# Patient Record
Sex: MALE | Race: BLACK OR AFRICAN AMERICAN | NOT HISPANIC OR LATINO | Employment: UNEMPLOYED | URBAN - METROPOLITAN AREA
[De-identification: names, ages, dates, MRNs, and addresses within clinical notes are randomized per-mention and may not be internally consistent; named-entity substitution may affect disease eponyms.]

---

## 2021-02-22 ENCOUNTER — LAB (OUTPATIENT)
Dept: LAB | Facility: CLINIC | Age: 46
End: 2021-02-22
Payer: COMMERCIAL

## 2021-02-22 ENCOUNTER — TRANSCRIBE ORDERS (OUTPATIENT)
Dept: LAB | Facility: CLINIC | Age: 46
End: 2021-02-22

## 2021-02-22 DIAGNOSIS — F41.1 GENERALIZED ANXIETY DISORDER: ICD-10-CM

## 2021-02-22 DIAGNOSIS — Z11.4 SCREENING FOR HUMAN IMMUNODEFICIENCY VIRUS: ICD-10-CM

## 2021-02-22 DIAGNOSIS — Z00.00 ROUTINE GENERAL MEDICAL EXAMINATION AT A HEALTH CARE FACILITY: ICD-10-CM

## 2021-02-22 DIAGNOSIS — Z00.00 ROUTINE GENERAL MEDICAL EXAMINATION AT A HEALTH CARE FACILITY: Primary | ICD-10-CM

## 2021-02-22 DIAGNOSIS — E55.9 VITAMIN D DEFICIENCY: ICD-10-CM

## 2021-02-22 DIAGNOSIS — R73.9 BLOOD GLUCOSE ELEVATED: ICD-10-CM

## 2021-02-22 LAB
25(OH)D3 SERPL-MCNC: 17.1 NG/ML (ref 30–100)
ALBUMIN SERPL BCP-MCNC: 4.1 G/DL (ref 3.5–5)
ALP SERPL-CCNC: 73 U/L (ref 46–116)
ALT SERPL W P-5'-P-CCNC: 20 U/L (ref 12–78)
ANION GAP SERPL CALCULATED.3IONS-SCNC: 5 MMOL/L (ref 4–13)
AST SERPL W P-5'-P-CCNC: 20 U/L (ref 5–45)
BASOPHILS # BLD AUTO: 0.03 THOUSANDS/ΜL (ref 0–0.1)
BASOPHILS NFR BLD AUTO: 1 % (ref 0–1)
BILIRUB SERPL-MCNC: 0.43 MG/DL (ref 0.2–1)
BUN SERPL-MCNC: 18 MG/DL (ref 5–25)
CALCIUM SERPL-MCNC: 9.6 MG/DL (ref 8.3–10.1)
CHLORIDE SERPL-SCNC: 111 MMOL/L (ref 100–108)
CHOLEST SERPL-MCNC: 169 MG/DL (ref 50–200)
CO2 SERPL-SCNC: 27 MMOL/L (ref 21–32)
CREAT SERPL-MCNC: 1.09 MG/DL (ref 0.6–1.3)
EOSINOPHIL # BLD AUTO: 0.08 THOUSAND/ΜL (ref 0–0.61)
EOSINOPHIL NFR BLD AUTO: 2 % (ref 0–6)
ERYTHROCYTE [DISTWIDTH] IN BLOOD BY AUTOMATED COUNT: 12.5 % (ref 11.6–15.1)
EST. AVERAGE GLUCOSE BLD GHB EST-MCNC: 91 MG/DL
GFR SERPL CREATININE-BSD FRML MDRD: 96 ML/MIN/1.73SQ M
GLUCOSE P FAST SERPL-MCNC: 96 MG/DL (ref 65–99)
HBA1C MFR BLD: 4.8 %
HCT VFR BLD AUTO: 43.7 % (ref 36.5–49.3)
HDLC SERPL-MCNC: 39 MG/DL
HGB BLD-MCNC: 13.8 G/DL (ref 12–17)
IMM GRANULOCYTES # BLD AUTO: 0.01 THOUSAND/UL (ref 0–0.2)
IMM GRANULOCYTES NFR BLD AUTO: 0 % (ref 0–2)
LDLC SERPL CALC-MCNC: 112 MG/DL (ref 0–100)
LYMPHOCYTES # BLD AUTO: 1.72 THOUSANDS/ΜL (ref 0.6–4.47)
LYMPHOCYTES NFR BLD AUTO: 35 % (ref 14–44)
MCH RBC QN AUTO: 30.3 PG (ref 26.8–34.3)
MCHC RBC AUTO-ENTMCNC: 31.6 G/DL (ref 31.4–37.4)
MCV RBC AUTO: 96 FL (ref 82–98)
MONOCYTES # BLD AUTO: 0.52 THOUSAND/ΜL (ref 0.17–1.22)
MONOCYTES NFR BLD AUTO: 11 % (ref 4–12)
NEUTROPHILS # BLD AUTO: 2.57 THOUSANDS/ΜL (ref 1.85–7.62)
NEUTS SEG NFR BLD AUTO: 51 % (ref 43–75)
NONHDLC SERPL-MCNC: 130 MG/DL
NRBC BLD AUTO-RTO: 0 /100 WBCS
PLATELET # BLD AUTO: 154 THOUSANDS/UL (ref 149–390)
PMV BLD AUTO: 12.3 FL (ref 8.9–12.7)
POTASSIUM SERPL-SCNC: 4.7 MMOL/L (ref 3.5–5.3)
PROT SERPL-MCNC: 8 G/DL (ref 6.4–8.2)
RBC # BLD AUTO: 4.55 MILLION/UL (ref 3.88–5.62)
SODIUM SERPL-SCNC: 143 MMOL/L (ref 136–145)
TRIGL SERPL-MCNC: 90 MG/DL
TSH SERPL DL<=0.05 MIU/L-ACNC: 1.13 UIU/ML (ref 0.36–3.74)
WBC # BLD AUTO: 4.93 THOUSAND/UL (ref 4.31–10.16)

## 2021-02-22 PROCEDURE — 87389 HIV-1 AG W/HIV-1&-2 AB AG IA: CPT

## 2021-02-22 PROCEDURE — 80061 LIPID PANEL: CPT

## 2021-02-22 PROCEDURE — 80053 COMPREHEN METABOLIC PANEL: CPT

## 2021-02-22 PROCEDURE — 82306 VITAMIN D 25 HYDROXY: CPT

## 2021-02-22 PROCEDURE — 84443 ASSAY THYROID STIM HORMONE: CPT

## 2021-02-22 PROCEDURE — 85025 COMPLETE CBC W/AUTO DIFF WBC: CPT

## 2021-02-22 PROCEDURE — 83036 HEMOGLOBIN GLYCOSYLATED A1C: CPT

## 2021-02-22 PROCEDURE — 36415 COLL VENOUS BLD VENIPUNCTURE: CPT

## 2021-02-23 LAB — HIV 1+2 AB+HIV1 P24 AG SERPL QL IA: NORMAL

## 2024-02-21 ENCOUNTER — OFFICE VISIT (OUTPATIENT)
Age: 49
End: 2024-02-21
Payer: COMMERCIAL

## 2024-02-21 VITALS
TEMPERATURE: 97.5 F | BODY MASS INDEX: 18.27 KG/M2 | DIASTOLIC BLOOD PRESSURE: 60 MMHG | RESPIRATION RATE: 18 BRPM | SYSTOLIC BLOOD PRESSURE: 90 MMHG | OXYGEN SATURATION: 98 % | WEIGHT: 116.4 LBS | HEART RATE: 82 BPM | HEIGHT: 67 IN

## 2024-02-21 DIAGNOSIS — Z00.00 PREVENTATIVE HEALTH CARE: ICD-10-CM

## 2024-02-21 DIAGNOSIS — M79.605 LEFT LEG PAIN: Primary | ICD-10-CM

## 2024-02-21 PROCEDURE — 99396 PREV VISIT EST AGE 40-64: CPT

## 2024-02-21 PROCEDURE — 99213 OFFICE O/P EST LOW 20 MIN: CPT

## 2024-02-21 NOTE — PROGRESS NOTES
ADULT ANNUAL PHYSICAL  [facility] PRIMARY CARE [place]    NAME: Erik Cordon  AGE: 48 y.o. SEX: male  : 1975     DATE: 2024     Assessment and Plan:     Problem List Items Addressed This Visit     Left leg pain - Primary     Patient has been experiencing some left leg/calf pain for the past 2 to 3 months after an injury to his left leg from the weight of a wheelchair.  Pain worse with walking and exercise.  I recommended the patient try physical therapy and take ibuprofen and/or Tylenol as needed for the pain.  Recommended the patient stay well-hydrated by drinking 6 to 8 glasses of water per day to avoid muscle cramps.  Instructed the patient to follow-up with us if his symptoms do not improve.         Relevant Orders    Ambulatory Referral to Physical Therapy    Preventative health care     Patient is here to establish care.  Routine lab work was placed.  Patient is due for colonoscopy, routine dental care, and routine ophthalmology evaluation.  Appropriate referrals placed.  Highly recommended that the patient follow-up with the specialists for preventative care.  Recommended the patient brushes teeth 2 times daily with toothpaste and schedule appointment with a dentist given history of some abnormal dentition and dental pain.  He denies any current dental pain.  He denies any current fever or chills.  Patient instructed to follow-up as needed with any questions or concerns.         Relevant Orders    Ambulatory Referral to Gastroenterology    PSA, Total Screen    CBC and differential    Comprehensive metabolic panel    TSH, 3rd generation with Free T4 reflex    Lipid panel    Ambulatory Referral to Dentistry    Ambulatory Referral to Ophthalmology       Immunizations and preventive care screenings were discussed with patient today. Appropriate education was printed on patient's after visit summary.    Discussed risks and benefits of prostate cancer  screening. We discussed the controversial history of PSA screening for prostate cancer in the [place] as well as the risk of over detection and over treatment of prostate cancer by way of PSA screening.  The patient understands that PSA blood testing is an imperfect way to screen for prostate cancer and that elevated PSA levels in the blood may also be caused by infection, inflammation, prostatic trauma or manipulation, urological procedures, or by benign prostatic enlargement.      Counseling:  Dental Health: discussed importance of regular tooth brushing, flossing, and dental visits.  Exercise: the importance of regular exercise/physical activity was discussed. Recommend exercise 3-5 times per week for at least 30 minutes.          No follow-ups on file.     Chief Complaint:     Chief Complaint   Patient presents with   • Establish Care     Mid section feels painful as well and says he wants testing for prostate cancer    • Leg Pain     Left leg pain    • Back Pain     Especially when it gets cold   • Fatigue   • Ingrown Toenail   • hand sweating    • Physical Exam   • GI Problem     Too much gas      History of Present Illness:     Adult Annual Physical   Patient here for a comprehensive physical exam. The patient reports problems - left leg pain .  Left leg pain for past 2-3 months. He was trying to help a man in a wheelchair down the stairs approximately 5 months ago when the wheelchair slid backward and hit his left leg.  Since he has had some left leg pain but hurts worst with walking, exercising.  He has tried stretching and gentle massage.  He primarily gets the pain in the back of his left calf.  He states that it feels like a charley horse at times.  The pain goes away with rest.  He takes Tylenol and/or ibuprofen as needed for the pain.     Diet and Physical Activity  Diet/Nutrition: well balanced diet.   Exercise: walking.      Depression Screening  PHQ-2/9 Depression Screening    Little interest  or pleasure in doing things: 1 - several days  Feeling down, depressed, or hopeless: 1 - several days  PHQ-2 Score: 2  PHQ-2 Interpretation: Negative depression screen       General Health  Sleep: sleeps well.   Hearing: normal - bilateral.  Vision: vision problems: states his vision is not great. He has not seen an eye doctor recently .   Dental: does not brush teeth regularly.   Says he will brush his teeth with water and not use toothpaste.      Health  Symptoms include: none    Advanced Care Planning  Do you have an advanced directive? unknown  Do you have a durable medical power of ?   ACP document given to patient? no     Review of Systems:     Review of Systems   Constitutional:  Negative for chills and fever.   HENT:  Negative for congestion and sore throat.    Eyes:  Negative for pain and visual disturbance.   Respiratory:  Negative for cough and shortness of breath.    Cardiovascular:  Negative for chest pain and palpitations.   Gastrointestinal:  Negative for abdominal pain, constipation and diarrhea.   Genitourinary:  Negative for dysuria and hematuria.   Musculoskeletal:  Positive for myalgias (Left leg pain). Negative for arthralgias.   Skin:  Negative for color change and rash.   Neurological:  Negative for dizziness and headaches.      Past Medical History:     History reviewed. No pertinent past medical history.   Past Surgical History:     History reviewed. No pertinent surgical history.   Family History:     History reviewed. No pertinent family history.   Social History:     Social History     Socioeconomic History   • Marital status: Registered Domestic Partner     Spouse name: None   • Number of children: None   • Years of education: None   • Highest education level: None   Occupational History   • None   Tobacco Use   • Smoking status: Every Day     Current packs/day: 0.50     Average packs/day: 0.5 packs/day for 27.1 years (13.6 ttl pk-yrs)     Types: Cigarettes     Start date: 1997  "  • Smokeless tobacco: Never   Vaping Use   • Vaping status: Never Used   Substance and Sexual Activity   • Alcohol use: Yes     Comment: holidays   • Drug use: Yes     Frequency: 7.0 times per week     Types: Marijuana     Comment: 4 times a day   • Sexual activity: None   Other Topics Concern   • None   Social History Narrative   • None     Social Determinants of Health     Financial Resource Strain: Not on file   Food Insecurity: Not on file   Transportation Needs: Not on file   Physical Activity: Not on file   Stress: Not on file   Social Connections: Not on file   Intimate Partner Violence: Not on file   Housing Stability: Not on file      Current Medications:     No current outpatient medications on file.     No current facility-administered medications for this visit.      Allergies:     Allergies   Allergen Reactions   • Pollen Extract Other (See Comments)     Watery eyes      Physical Exam:     BP 90/60 (BP Location: Right arm, Patient Position: Sitting, Cuff Size: Standard)   Pulse 82   Temp 97.5 °F (36.4 °C) (Tympanic)   Resp 18   Ht 5' 6.5\" (1.689 m)   Wt 52.8 kg (116 lb 6.4 oz)   SpO2 98%   BMI 18.51 kg/m²     Physical Exam  Vitals and nursing note reviewed.   Constitutional:       General: He is not in acute distress.     Appearance: He is well-developed.   HENT:      Head: Normocephalic and atraumatic.      Right Ear: Tympanic membrane normal.      Left Ear: Tympanic membrane normal.      Nose: Nose normal.      Mouth/Throat:      Mouth: Mucous membranes are moist.      Dentition: Abnormal dentition. Dental caries present. No dental abscesses or gum lesions.      Pharynx: Oropharynx is clear. No oropharyngeal exudate.      Comments: Evidence of some missing dentition and dental caries.  Eyes:      Extraocular Movements: Extraocular movements intact.      Conjunctiva/sclera: Conjunctivae normal.   Cardiovascular:      Rate and Rhythm: Normal rate and regular rhythm.      Heart sounds: No murmur " heard.  Pulmonary:      Effort: Pulmonary effort is normal. No respiratory distress.      Breath sounds: Normal breath sounds. No wheezing, rhonchi or rales.   Abdominal:      Palpations: Abdomen is soft.      Tenderness: There is no abdominal tenderness.   Musculoskeletal:         General: No swelling, tenderness or deformity. Normal range of motion.      Cervical back: Neck supple.      Right lower leg: No edema.      Left lower leg: No edema.      Comments: No tenderness to palpation of the calf muscles.  No bruises or lesions of the lower extremity noted.  Strength and sensation intact bilaterally 5/5.    Skin:     General: Skin is warm and dry.      Capillary Refill: Capillary refill takes less than 2 seconds.   Neurological:      General: No focal deficit present.      Mental Status: He is alert.      Motor: No weakness.      Gait: Gait normal.   Psychiatric:         Mood and Affect: Mood is anxious.         Behavior: Behavior normal.          Megan Michaels PA-C  [facility] PRIMARY CARE [place]

## 2024-02-21 NOTE — ASSESSMENT & PLAN NOTE
Patient is here to establish care.  Routine lab work was placed.  Patient is due for colonoscopy, routine dental care, and routine ophthalmology evaluation.  Appropriate referrals placed.  Highly recommended that the patient follow-up with the specialists for preventative care.  Recommended the patient brushes teeth 2 times daily with toothpaste and schedule appointment with a dentist given history of some abnormal dentition and dental pain.  He denies any current dental pain.  He denies any current fever or chills.  Patient instructed to follow-up as needed with any questions or concerns.

## 2024-02-21 NOTE — ASSESSMENT & PLAN NOTE
Patient has been experiencing some left leg/calf pain for the past 2 to 3 months after an injury to his left leg from the weight of a wheelchair.  Pain worse with walking and exercise.  I recommended the patient try physical therapy and take ibuprofen and/or Tylenol as needed for the pain.  Recommended the patient stay well-hydrated by drinking 6 to 8 glasses of water per day to avoid muscle cramps.  Instructed the patient to follow-up with us if his symptoms do not improve.

## 2024-02-22 ENCOUNTER — TELEPHONE (OUTPATIENT)
Age: 49
End: 2024-02-22

## 2024-02-22 ENCOUNTER — APPOINTMENT (OUTPATIENT)
Age: 49
End: 2024-02-22
Payer: COMMERCIAL

## 2024-02-22 DIAGNOSIS — Z13.6 SCREENING FOR CARDIOVASCULAR, RESPIRATORY, AND GENITOURINARY DISEASES: Primary | ICD-10-CM

## 2024-02-22 DIAGNOSIS — Z13.89 SCREENING FOR CARDIOVASCULAR, RESPIRATORY, AND GENITOURINARY DISEASES: Primary | ICD-10-CM

## 2024-02-22 DIAGNOSIS — Z13.83 SCREENING FOR CARDIOVASCULAR, RESPIRATORY, AND GENITOURINARY DISEASES: Primary | ICD-10-CM

## 2024-02-22 DIAGNOSIS — Z00.00 PREVENTATIVE HEALTH CARE: ICD-10-CM

## 2024-02-22 DIAGNOSIS — Z12.5 SCREENING FOR MALIGNANT NEOPLASM OF PROSTATE: ICD-10-CM

## 2024-02-22 LAB
ALBUMIN SERPL BCP-MCNC: 3.9 G/DL (ref 3.5–5)
ALP SERPL-CCNC: 74 U/L (ref 34–104)
ALT SERPL W P-5'-P-CCNC: 13 U/L (ref 7–52)
ANION GAP SERPL CALCULATED.3IONS-SCNC: 5 MMOL/L
AST SERPL W P-5'-P-CCNC: 16 U/L (ref 13–39)
BASOPHILS # BLD AUTO: 0.04 THOUSANDS/ÂΜL (ref 0–0.1)
BASOPHILS NFR BLD AUTO: 1 % (ref 0–1)
BILIRUB SERPL-MCNC: 0.41 MG/DL (ref 0.2–1)
BUN SERPL-MCNC: 20 MG/DL (ref 5–25)
CALCIUM SERPL-MCNC: 8.7 MG/DL (ref 8.4–10.2)
CHLORIDE SERPL-SCNC: 107 MMOL/L (ref 96–108)
CHOLEST SERPL-MCNC: 173 MG/DL
CO2 SERPL-SCNC: 26 MMOL/L (ref 21–32)
CREAT SERPL-MCNC: 1.01 MG/DL (ref 0.6–1.3)
EOSINOPHIL # BLD AUTO: 0.14 THOUSAND/ÂΜL (ref 0–0.61)
EOSINOPHIL NFR BLD AUTO: 2 % (ref 0–6)
ERYTHROCYTE [DISTWIDTH] IN BLOOD BY AUTOMATED COUNT: 12.7 % (ref 11.6–15.1)
GFR SERPL CREATININE-BSD FRML MDRD: 88 ML/MIN/1.73SQ M
GLUCOSE P FAST SERPL-MCNC: 89 MG/DL (ref 65–99)
HCT VFR BLD AUTO: 42.2 % (ref 36.5–49.3)
HDLC SERPL-MCNC: 41 MG/DL
HGB BLD-MCNC: 13.5 G/DL (ref 12–17)
IMM GRANULOCYTES # BLD AUTO: 0.01 THOUSAND/UL (ref 0–0.2)
IMM GRANULOCYTES NFR BLD AUTO: 0 % (ref 0–2)
LDLC SERPL CALC-MCNC: 117 MG/DL (ref 0–100)
LYMPHOCYTES # BLD AUTO: 1.88 THOUSANDS/ÂΜL (ref 0.6–4.47)
LYMPHOCYTES NFR BLD AUTO: 25 % (ref 14–44)
MCH RBC QN AUTO: 30.8 PG (ref 26.8–34.3)
MCHC RBC AUTO-ENTMCNC: 32 G/DL (ref 31.4–37.4)
MCV RBC AUTO: 96 FL (ref 82–98)
MONOCYTES # BLD AUTO: 0.7 THOUSAND/ÂΜL (ref 0.17–1.22)
MONOCYTES NFR BLD AUTO: 9 % (ref 4–12)
NEUTROPHILS # BLD AUTO: 4.76 THOUSANDS/ÂΜL (ref 1.85–7.62)
NEUTS SEG NFR BLD AUTO: 63 % (ref 43–75)
NONHDLC SERPL-MCNC: 132 MG/DL
NRBC BLD AUTO-RTO: 0 /100 WBCS
PLATELET # BLD AUTO: 181 THOUSANDS/UL (ref 149–390)
PMV BLD AUTO: 11.3 FL (ref 8.9–12.7)
POTASSIUM SERPL-SCNC: 4.4 MMOL/L (ref 3.5–5.3)
PROT SERPL-MCNC: 7.3 G/DL (ref 6.4–8.4)
PSA SERPL-MCNC: 0.4 NG/ML (ref 0–4)
RBC # BLD AUTO: 4.38 MILLION/UL (ref 3.88–5.62)
SODIUM SERPL-SCNC: 138 MMOL/L (ref 135–147)
TRIGL SERPL-MCNC: 74 MG/DL
TSH SERPL DL<=0.05 MIU/L-ACNC: 0.97 UIU/ML (ref 0.45–4.5)
WBC # BLD AUTO: 7.53 THOUSAND/UL (ref 4.31–10.16)

## 2024-02-22 PROCEDURE — 85025 COMPLETE CBC W/AUTO DIFF WBC: CPT

## 2024-02-22 PROCEDURE — 84443 ASSAY THYROID STIM HORMONE: CPT

## 2024-02-22 PROCEDURE — 80061 LIPID PANEL: CPT

## 2024-02-22 PROCEDURE — G0103 PSA SCREENING: HCPCS

## 2024-02-22 PROCEDURE — 36415 COLL VENOUS BLD VENIPUNCTURE: CPT

## 2024-02-22 PROCEDURE — 80053 COMPREHEN METABOLIC PANEL: CPT

## 2024-02-22 NOTE — TELEPHONE ENCOUNTER
Called the patient to discuss lab results. His wife, Leila Foy, sent the [employer] message and requested that I discuss her 's test results with her. I confirmed her identity and reviewed the medical communication consent form on file to ensure that she has consent to receive information pertaining to [facility]. Once consent was confirmed, the results of the lab tests were discussed with the patient's wife in detail. I instructed Leila to follow up with us with any questions or concerns.

## 2024-02-28 DIAGNOSIS — M79.605 LEFT LEG PAIN: Primary | ICD-10-CM

## 2024-02-28 RX ORDER — IBUPROFEN 200 MG
400 TABLET ORAL EVERY 6 HOURS PRN
Qty: 120 TABLET | Refills: 1 | Status: SHIPPED | OUTPATIENT
Start: 2024-02-28

## 2024-02-29 ENCOUNTER — APPOINTMENT (OUTPATIENT)
Dept: RADIOLOGY | Facility: CLINIC | Age: 49
End: 2024-02-29
Payer: COMMERCIAL

## 2024-02-29 ENCOUNTER — OFFICE VISIT (OUTPATIENT)
Dept: OBGYN CLINIC | Facility: CLINIC | Age: 49
End: 2024-02-29
Payer: COMMERCIAL

## 2024-02-29 VITALS
DIASTOLIC BLOOD PRESSURE: 71 MMHG | SYSTOLIC BLOOD PRESSURE: 112 MMHG | BODY MASS INDEX: 18.05 KG/M2 | HEART RATE: 63 BPM | HEIGHT: 67 IN | WEIGHT: 115 LBS

## 2024-02-29 DIAGNOSIS — M43.06 PARS DEFECT OF LUMBAR SPINE: ICD-10-CM

## 2024-02-29 DIAGNOSIS — M79.662 PAIN IN LEFT LOWER LEG: ICD-10-CM

## 2024-02-29 DIAGNOSIS — M54.16 RADICULOPATHY, LUMBAR REGION: ICD-10-CM

## 2024-02-29 DIAGNOSIS — S80.12XA CONTUSION OF LEFT LOWER LEG, INITIAL ENCOUNTER: ICD-10-CM

## 2024-02-29 DIAGNOSIS — S39.012A LUMBAR STRAIN, INITIAL ENCOUNTER: ICD-10-CM

## 2024-02-29 DIAGNOSIS — M62.830 LUMBAR PARASPINAL MUSCLE SPASM: ICD-10-CM

## 2024-02-29 DIAGNOSIS — M54.16 RADICULOPATHY, LUMBAR REGION: Primary | ICD-10-CM

## 2024-02-29 DIAGNOSIS — R29.898 WEAKNESS OF BOTH HIPS: ICD-10-CM

## 2024-02-29 PROCEDURE — 72110 X-RAY EXAM L-2 SPINE 4/>VWS: CPT

## 2024-02-29 PROCEDURE — 73590 X-RAY EXAM OF LOWER LEG: CPT

## 2024-02-29 PROCEDURE — 99204 OFFICE O/P NEW MOD 45 MIN: CPT | Performed by: FAMILY MEDICINE

## 2024-02-29 RX ORDER — PREDNISONE 20 MG/1
20 TABLET ORAL 2 TIMES DAILY WITH MEALS
Qty: 10 TABLET | Refills: 0 | Status: SHIPPED | OUTPATIENT
Start: 2024-02-29 | End: 2024-03-05

## 2024-02-29 RX ORDER — MELOXICAM 15 MG/1
15 TABLET ORAL DAILY
Qty: 30 TABLET | Refills: 1 | Status: SHIPPED | OUTPATIENT
Start: 2024-02-29

## 2024-02-29 NOTE — PATIENT INSTRUCTIONS
Rx: Prednisone 20 mg  - Twice daily  - Eat first  - 5 days  - Makes you energetic  - No ibuprofen  - No Aleve  - Tylenol is okay    After completing prednisone start meloxicam    Rx: Meloxicam 15 mg  - once daily  - eat first  - everyday  - 30 days  - no ibuprofen  - no aleve  - tylenol is ok    Physical therapy and home exercises    Over-the-counter vitamins:    - Turmeric vitamin at least 1000 mg daily    - Tat cherry vitamin at least 1000 mg daily    - Glucosamine-chondrointin 2 -3 times daily    Over-the-counter topical diclofenac  - 3 times daily as needed
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
## 2024-03-27 ENCOUNTER — EVALUATION (OUTPATIENT)
Dept: PHYSICAL THERAPY | Facility: CLINIC | Age: 49
End: 2024-03-27
Payer: COMMERCIAL

## 2024-03-27 DIAGNOSIS — M62.830 LUMBAR PARASPINAL MUSCLE SPASM: ICD-10-CM

## 2024-03-27 DIAGNOSIS — M54.16 RADICULOPATHY, LUMBAR REGION: ICD-10-CM

## 2024-03-27 DIAGNOSIS — S39.012A LUMBAR STRAIN, INITIAL ENCOUNTER: ICD-10-CM

## 2024-03-27 DIAGNOSIS — M43.06 PARS DEFECT OF LUMBAR SPINE: ICD-10-CM

## 2024-03-27 DIAGNOSIS — S80.12XA CONTUSION OF LEFT LOWER LEG, INITIAL ENCOUNTER: ICD-10-CM

## 2024-03-27 DIAGNOSIS — R29.898 WEAKNESS OF BOTH HIPS: ICD-10-CM

## 2024-03-27 PROCEDURE — 97161 PT EVAL LOW COMPLEX 20 MIN: CPT

## 2024-03-27 PROCEDURE — 97110 THERAPEUTIC EXERCISES: CPT
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
## 2024-04-02 ENCOUNTER — OFFICE VISIT (OUTPATIENT)
Dept: PHYSICAL THERAPY | Facility: CLINIC | Age: 49
End: 2024-04-02
Payer: COMMERCIAL

## 2024-04-02 DIAGNOSIS — R29.898 WEAKNESS OF BOTH HIPS: ICD-10-CM

## 2024-04-02 DIAGNOSIS — S39.012A LUMBAR STRAIN, INITIAL ENCOUNTER: ICD-10-CM

## 2024-04-02 DIAGNOSIS — M43.06 PARS DEFECT OF LUMBAR SPINE: ICD-10-CM

## 2024-04-02 DIAGNOSIS — M62.830 LUMBAR PARASPINAL MUSCLE SPASM: ICD-10-CM

## 2024-04-02 DIAGNOSIS — M54.16 RADICULOPATHY, LUMBAR REGION: Primary | ICD-10-CM

## 2024-04-02 DIAGNOSIS — S80.12XA CONTUSION OF LEFT LOWER LEG, INITIAL ENCOUNTER: ICD-10-CM

## 2024-04-02 PROCEDURE — 97110 THERAPEUTIC EXERCISES: CPT

## 2024-04-02 PROCEDURE — 97112 NEUROMUSCULAR REEDUCATION: CPT

## 2024-04-02 NOTE — PROGRESS NOTES
"Daily Note     Today's date: 2024  Patient name: Erik Cordon  : 1975  MRN: [id number]  Referring provider: Ronny Farrell*  Dx:   Encounter Diagnosis     ICD-10-CM    1. Radiculopathy, lumbar region  M54.16       2. Pars defect of lumbar spine  M43.06       3. Lumbar paraspinal muscle spasm  M62.830       4. Weakness of both hips  R29.898       5. Lumbar strain, initial encounter  S39.012A       6. Contusion of left lower leg, initial encounter  S80.12XA           Start Time: 1015  Stop Time: 1054  Total time in clinic (min): 39 minutes    Subjective: Pt reports that he hasn't been doing his HEP. He states that he is feeling very sore from working on his car yesterday. He states that he gets cold after he exercises.      Objective: See treatment diary below      Assessment: Pt has limited tolerance to today's session due to pain and fatigue. Pt is challenged with muscular endurance. PT advised pt to follow up with PCP in regards to post exercise cold feeling. Pt will benefit from skilled physical therapy in order to improve TA activation and endurance, improve lumbar mobility, and reduce pain with function.       Plan: Continue per plan of care.  Progress treatment as tolerated.       Precautions: None  Access Code: [email or web address]    POC expires Unit limit Auth Expiration date PT/OT/ST + Visit Limit?   5/10/24 N/A 24 BOMN                           Visit/Unit Tracking  AUTH Status:  Date 3/27 4/2             Approved 18 visits Used 1 2              Remaining  17 16                   Date 3/27 4/2           Re-Eval             FOTO             Manuals                                                                 Neuro Re-Ed             TA activation 5\"x20 HL 5\"x20 HL           Paloff press             Bridge + TA  x10                                                               Ther Ex             Bike  6'           PPU 3x10 2x10           LEIS 3x10            LTR   5\"x10             "                                      Ther Activity                                       Gait Training                                       Modalities
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
## 2024-04-05 ENCOUNTER — CONSULT (OUTPATIENT)
Dept: PAIN MEDICINE | Facility: CLINIC | Age: 49
End: 2024-04-05
Payer: COMMERCIAL

## 2024-04-05 VITALS
DIASTOLIC BLOOD PRESSURE: 78 MMHG | SYSTOLIC BLOOD PRESSURE: 122 MMHG | HEART RATE: 73 BPM | WEIGHT: 119.2 LBS | HEIGHT: 67 IN | BODY MASS INDEX: 18.71 KG/M2

## 2024-04-05 DIAGNOSIS — M54.16 LUMBAR RADICULOPATHY: ICD-10-CM

## 2024-04-05 DIAGNOSIS — M54.42 CHRONIC BILATERAL LOW BACK PAIN WITH LEFT-SIDED SCIATICA: Primary | ICD-10-CM

## 2024-04-05 DIAGNOSIS — G89.29 CHRONIC BILATERAL LOW BACK PAIN WITH LEFT-SIDED SCIATICA: Primary | ICD-10-CM

## 2024-04-05 PROCEDURE — 99204 OFFICE O/P NEW MOD 45 MIN: CPT | Performed by: STUDENT IN AN ORGANIZED HEALTH CARE EDUCATION/TRAINING PROGRAM

## 2024-04-05 NOTE — PATIENT INSTRUCTIONS
Magnetic Resonance Imaging   WHAT YOU NEED TO KNOW:   Magnetic resonance imaging (MRI) is a test that uses magnetic fields and radio waves to take pictures inside your body. An MRI is used to see blood vessels, tissue, muscles, and bones. It can also show organs, such as your heart, lungs, or liver. An MRI can help your healthcare provider diagnose or treat a medical condition. It does not use radiation.  DISCHARGE INSTRUCTIONS:   Call your local emergency number (911 in the ) if:   You have signs of an allergic reaction to the contrast liquid, such as trouble breathing, swelling of your mouth or face, or fainting.      Return to the emergency department if:   You are dizzy or feel faint.    You have a rash, itching, or swollen skin.    You have nausea or are vomiting.    You are suddenly urinating less than usual.    Call your doctor if:   You have questions or concerns about your condition or care.      Drink liquids as directed:  Liquids will help flush the contrast liquid out of your body. Ask how much liquid to drink after your MRI, and which liquids to drink.  Follow up with your doctor as directed:  Write down your questions so you remember to ask them during your visits.  © Copyright [employer] 2023 Information is for End User's use only and may not be sold, redistributed or otherwise used for commercial purposes.  The above information is an  only. It is not intended as medical advice for individual conditions or treatments. Talk to your doctor, nurse or pharmacist before following any medical regimen to see if it is safe and effective for you.

## 2024-04-12 ENCOUNTER — OFFICE VISIT (OUTPATIENT)
Dept: PHYSICAL THERAPY | Facility: CLINIC | Age: 49
End: 2024-04-12
Payer: COMMERCIAL

## 2024-04-12 DIAGNOSIS — S39.012A LUMBAR STRAIN, INITIAL ENCOUNTER: ICD-10-CM

## 2024-04-12 DIAGNOSIS — S80.12XA CONTUSION OF LEFT LOWER LEG, INITIAL ENCOUNTER: ICD-10-CM

## 2024-04-12 DIAGNOSIS — M43.06 PARS DEFECT OF LUMBAR SPINE: ICD-10-CM

## 2024-04-12 DIAGNOSIS — R29.898 WEAKNESS OF BOTH HIPS: ICD-10-CM

## 2024-04-12 DIAGNOSIS — M54.16 RADICULOPATHY, LUMBAR REGION: Primary | ICD-10-CM

## 2024-04-12 DIAGNOSIS — M62.830 LUMBAR PARASPINAL MUSCLE SPASM: ICD-10-CM

## 2024-04-12 PROCEDURE — 97110 THERAPEUTIC EXERCISES: CPT

## 2024-04-12 PROCEDURE — 97112 NEUROMUSCULAR REEDUCATION: CPT

## 2024-04-12 NOTE — PROGRESS NOTES
Daily Note     Today's date: 2024  Patient name: Erik Cordon  : 1975  MRN: [id number]  Referring provider: Ronny Farrell*  Dx:   Encounter Diagnosis     ICD-10-CM    1. Radiculopathy, lumbar region  M54.16       2. Pars defect of lumbar spine  M43.06       3. Lumbar paraspinal muscle spasm  M62.830       4. Weakness of both hips  R29.898       5. Lumbar strain, initial encounter  S39.012A       6. Contusion of left lower leg, initial encounter  S80.12XA                      Subjective: Pt states he was sore following last session.  He states he feels he is getting worse due to increased LBP.  Following bike for cardiovascular endurance, patient reports lightheadedness.  He reports feeling cold after exercise.  Pt also notes he has been feeling depressed lately secondary to his inability to be active.  Denies thoughts of harming himself.      Regarding low back and LE pain, pt reports he has difficulty walking and standing.        Objective: See treatment diary below  BP seated in RUE with manual cuff is 118/80 (taken after biking)      Assessment: Pt was provided contact information for behavioral health due to recent depression.  Patient also advised to follow up with PCP regarding recent lightheadedness and dizziness.  He demonstrates verbal understanding.  No change in LE sx with prone progression and reports exacerbation of sx.  No progressions in treatment today secondary to high sx irritability.        Plan: Continue per plan of care.      Precautions: None  Access Code: [email or web address]    POC expires Unit limit Auth Expiration date PT/OT/ST + Visit Limit?   5/10/24 N/A 24 BOMN                           Visit/Unit Tracking  AUTH Status:  Date 3/27 4/2 4/12            Approved 18 visits Used 1 2 3             Remaining  17 16 15                  Date 3/27 4/2 4/12          Re-Eval             FOTO             Manuals                                                                "  Neuro Re-Ed             Pt education   Pain science          TA activation 5\"x20 HL 5\"x20 HL           Paloff press             Bridge + TA  x10 x10                                                              Ther Ex             Pt education   Tx, PT POC          Bike  6' 5' no resist          PPU 3x10 2x10 2x10          LEIS 3x10            LTR   5\"x10           Prone lying   5'          TAJ   5' c breaths                       Ther Activity                                       Gait Training                                       Modalities                                              " I have reviewed and confirmed nurses' notes...

## 2024-04-19 ENCOUNTER — APPOINTMENT (OUTPATIENT)
Dept: PHYSICAL THERAPY | Facility: CLINIC | Age: 49
End: 2024-04-19
Payer: COMMERCIAL

## 2024-04-22 ENCOUNTER — HOSPITAL ENCOUNTER (OUTPATIENT)
Dept: MRI IMAGING | Facility: HOSPITAL | Age: 49
Discharge: HOME | End: 2024-04-22
Attending: STUDENT IN AN ORGANIZED HEALTH CARE EDUCATION/TRAINING PROGRAM
Payer: COMMERCIAL

## 2024-04-22 DIAGNOSIS — M54.42 CHRONIC BILATERAL LOW BACK PAIN WITH LEFT-SIDED SCIATICA: ICD-10-CM

## 2024-04-22 DIAGNOSIS — G89.29 CHRONIC BILATERAL LOW BACK PAIN WITH LEFT-SIDED SCIATICA: ICD-10-CM

## 2024-04-22 PROCEDURE — 72148 MRI LUMBAR SPINE W/O DYE: CPT

## 2024-04-26 ENCOUNTER — OFFICE VISIT (OUTPATIENT)
Dept: PHYSICAL THERAPY | Facility: CLINIC | Age: 49
End: 2024-04-26
Payer: COMMERCIAL

## 2024-04-26 DIAGNOSIS — M54.16 RADICULOPATHY, LUMBAR REGION: Primary | ICD-10-CM

## 2024-04-26 DIAGNOSIS — S39.012A LUMBAR STRAIN, INITIAL ENCOUNTER: ICD-10-CM

## 2024-04-26 DIAGNOSIS — S80.12XA CONTUSION OF LEFT LOWER LEG, INITIAL ENCOUNTER: ICD-10-CM

## 2024-04-26 DIAGNOSIS — M43.06 PARS DEFECT OF LUMBAR SPINE: ICD-10-CM

## 2024-04-26 DIAGNOSIS — M62.830 LUMBAR PARASPINAL MUSCLE SPASM: ICD-10-CM

## 2024-04-26 PROCEDURE — 97112 NEUROMUSCULAR REEDUCATION: CPT

## 2024-04-26 PROCEDURE — 97110 THERAPEUTIC EXERCISES: CPT

## 2024-04-26 RX ORDER — CELECOXIB 100 MG/1
100 CAPSULE ORAL 2 TIMES DAILY PRN
Qty: 30 CAPSULE | Refills: 0 | Status: SHIPPED | OUTPATIENT
Start: 2024-04-26

## 2024-04-26 NOTE — PROGRESS NOTES
"PT Discharge    Today's date: 2024  Patient name: Erik Cordon  : 1975  MRN: [id number]  Referring provider: Ronny Farrell*  Dx:   Encounter Diagnosis     ICD-10-CM    1. Radiculopathy, lumbar region  M54.16       2. Lumbar strain, initial encounter  S39.012A       3. Pars defect of lumbar spine  M43.06       4. Contusion of left lower leg, initial encounter  S80.12XA       5. Lumbar paraspinal muscle spasm  M62.830           Start Time: 1100  Stop Time: 1127  Total time in clinic (min): 27 minutes    Subjective: Pt reports that he is cold all of the time and has more pain after PT. He states that he got an MRI and hasn't gotten results yet.       Objective: See treatment diary below        Assessment: PT discussed plan of care with pt, due to lack of progress and systemic complaints, pt will follow up with referring provider/PCP and discontinue physical therapy at this time. Pt is not having a favorable response to mechanical movements. Pt was informed that if he has any questions or concerns he is welcome to contact facility at any time. Pt is discharged from skilled physical therapy.       Plan: Continue per plan of care.      Precautions: None  Access Code: [email or web address]    POC expires Unit limit Auth Expiration date PT/OT/ST + Visit Limit?   5/10/24 N/A 24 BOMN                           Visit/Unit Tracking  AUTH Status:  Date 3/27 4/2 4/12 4/26           Approved 18 visits Used 1 2 3 4            Remaining  17 16 15 14                 Date 3/27 4/2 4/12 4/26         Re-Eval             FOTO             Manuals                                                                 Neuro Re-Ed             Pt education   Pain science          TA activation 5\"x20 HL 5\"x20 HL           Paloff press             Bridge + TA  x10 x10          PB press    5\"x10         PB diagonals     5\"x10                                   Ther Ex             Pt education   Tx, PT POC POC, pain science       " "   Bike  6' 5' no resist          PPU 3x10 2x10 2x10          LEIS 3x10            LTR   5\"x10           Prone lying   5'          TAJ   5' c breaths          Hip flexion    Sit x10 ea         LAQ    Sit x10 ea                      Ther Activity                                       Gait Training                                       Modalities                                              "

## 2024-04-29 DIAGNOSIS — M54.16 LUMBAR RADICULOPATHY: Primary | ICD-10-CM

## 2024-05-01 ENCOUNTER — OFFICE VISIT (OUTPATIENT)
Age: 49
End: 2024-05-01
Payer: COMMERCIAL

## 2024-05-01 VITALS
OXYGEN SATURATION: 100 % | SYSTOLIC BLOOD PRESSURE: 143 MMHG | DIASTOLIC BLOOD PRESSURE: 88 MMHG | RESPIRATION RATE: 20 BRPM | HEART RATE: 85 BPM | TEMPERATURE: 95.8 F

## 2024-05-01 DIAGNOSIS — R10.13 ABDOMINAL PAIN, EPIGASTRIC: Primary | ICD-10-CM

## 2024-05-01 DIAGNOSIS — E55.9 VITAMIN D DEFICIENCY: ICD-10-CM

## 2024-05-01 DIAGNOSIS — R07.89 CHEST DISCOMFORT: ICD-10-CM

## 2024-05-01 DIAGNOSIS — M79.605 LEFT LEG PAIN: ICD-10-CM

## 2024-05-01 PROCEDURE — 93000 ELECTROCARDIOGRAM COMPLETE: CPT | Performed by: FAMILY MEDICINE

## 2024-05-01 PROCEDURE — 99214 OFFICE O/P EST MOD 30 MIN: CPT | Performed by: FAMILY MEDICINE

## 2024-05-01 RX ORDER — TIZANIDINE 4 MG/1
4 TABLET ORAL 3 TIMES DAILY
Qty: 30 TABLET | Refills: 0 | Status: SHIPPED | OUTPATIENT
Start: 2024-05-01

## 2024-05-01 RX ORDER — PANTOPRAZOLE SODIUM 40 MG/1
40 TABLET, DELAYED RELEASE ORAL DAILY
Qty: 30 TABLET | Refills: 0 | Status: SHIPPED | OUTPATIENT
Start: 2024-05-01

## 2024-05-01 RX ORDER — SUCRALFATE 1 G/1
1 TABLET ORAL 4 TIMES DAILY
Qty: 14 TABLET | Refills: 0 | Status: SHIPPED | OUTPATIENT
Start: 2024-05-01

## 2024-05-01 RX ORDER — ERGOCALCIFEROL 1.25 MG/1
50000 CAPSULE ORAL WEEKLY
Qty: 12 CAPSULE | Refills: 0 | Status: SHIPPED | OUTPATIENT
Start: 2024-05-01

## 2024-05-01 NOTE — PROGRESS NOTES
Name: Erik Cordon      : 1975      MRN: [id number]  Encounter Provider: Erendira Falk DO  Encounter Date: 2024   Encounter department: [facility] [place]    Assessment & Plan     1. Abdominal pain, epigastric  2. Chest discomfort- no ST changes on ecg. Exam significant for epigastric tenderness. Liley GERD. Will treat with therapy below. ER precaution reviewed.  Bp eleated today but likely 2/2 pain. Recommended Cardiology f/u due to moderate ASCVD risk and current tobacco use.  -     POCT ECG  -     pantoprazole (PROTONIX) 40 mg tablet; Take 1 tablet (40 mg total) by mouth daily  -     sucralfate (CARAFATE) 1 g tablet; Take 1 tablet (1 g total) by mouth 4 (four) times a day    3. Vitamin D deficiency- noted on previous studies. Supplement provided.   -     ergocalciferol (VITAMIN D2) 50,000 units; Take 1 capsule (50,000 Units total) by mouth once a week    4. Left leg pain- chronic issue. Refractory to nsaids. Has established with orthopedics and pain management. Currently in PT. Will provide alternative analgesia   -     tiZANidine (ZANAFLEX) 4 mg tablet; Take 1 tablet (4 mg total) by mouth 3 (three) times a day             Subjective      Pt presents c/o severe chest pain. Sharp, burning. Woke up this morning with the pain. Associated with nausea. Not worse with lying down or leaning forward.   Hx of reflux. Untreated. Had coffee and pizza only yesterday.  Tends to drink coffee on an empty stomach often.   Current tobacco user.             Review of Systems   Constitutional:  Negative for fever.   Respiratory:  Positive for shortness of breath.    Cardiovascular:  Positive for chest pain.   Gastrointestinal:  Positive for nausea. Negative for vomiting.   Musculoskeletal:  Positive for arthralgias.   Neurological:  Negative for light-headedness and headaches.       Current Outpatient Medications on File Prior to Visit   Medication Sig    celecoxib (CeleBREX) 100  mg capsule Take 1 capsule (100 mg total) by mouth 2 (two) times a day as needed for moderate pain    [DISCONTINUED] meloxicam (MOBIC) 15 mg tablet Take 1 tablet (15 mg total) by mouth daily       Objective     /88 (BP Location: Left arm, Patient Position: Supine, Cuff Size: Standard)   Pulse 85   Temp (!) 95.8 °F (35.4 °C) (Tympanic)   Resp 20   SpO2 100%     Physical Exam  HENT:      Head: Normocephalic.   Eyes:      Conjunctiva/sclera: Conjunctivae normal.   Cardiovascular:      Rate and Rhythm: Normal rate and regular rhythm.   Pulmonary:      Effort: Pulmonary effort is normal.      Breath sounds: Normal breath sounds.   Abdominal:      General: Abdomen is flat.      Palpations: Abdomen is soft.      Tenderness: There is abdominal tenderness in the epigastric area.   Neurological:      Mental Status: He is alert and oriented to person, place, and time.      Gait: Gait normal.   Psychiatric:         Mood and Affect: Mood is anxious.         Speech: Speech is rapid and pressured.         Behavior: Behavior is agitated. Behavior is cooperative.       Erendira Falk, DO

## 2024-05-28 DIAGNOSIS — E55.9 VITAMIN D DEFICIENCY: Primary | ICD-10-CM

## 2024-05-30 ENCOUNTER — CONSULT (OUTPATIENT)
Dept: CARDIOLOGY CLINIC | Facility: CLINIC | Age: 49
End: 2024-05-30
Payer: COMMERCIAL

## 2024-05-30 VITALS
DIASTOLIC BLOOD PRESSURE: 66 MMHG | SYSTOLIC BLOOD PRESSURE: 112 MMHG | OXYGEN SATURATION: 90 % | WEIGHT: 114.2 LBS | BODY MASS INDEX: 17.92 KG/M2 | HEART RATE: 66 BPM | HEIGHT: 67 IN

## 2024-05-30 DIAGNOSIS — R07.89 CHEST DISCOMFORT: Primary | ICD-10-CM

## 2024-05-30 DIAGNOSIS — F17.200 CURRENT EVERY DAY SMOKER: ICD-10-CM

## 2024-05-30 DIAGNOSIS — R06.09 DOE (DYSPNEA ON EXERTION): ICD-10-CM

## 2024-05-30 PROCEDURE — 99204 OFFICE O/P NEW MOD 45 MIN: CPT | Performed by: INTERNAL MEDICINE

## 2024-05-30 NOTE — PROGRESS NOTES
[facility]    CHIEF COMPLAINT:   Chief Complaint   Patient presents with    New Patient Visit     Referral by Dr. Erendira Falk for chest discomfort      Back Pain     Patient attributes most of his concerns to pain in his back    Chest Pain     Discomfort when he is walking     Shortness of Breath     With mild exertion    Fatigue     Very quickly with any exertion    Leg Swelling     Left leg pain        HPI:  Erik Cordon is a 48 y.o. male with a past medical history of current every day smoker who is referred for chest/epigastric discomfort.    Reports chest discomfort which started about 4 weeks ago. He thinks he had acid reflux which he describes as a burning in a chest that woke him up from sleep. He saw his primary care provider and was prescribed Pantoprazole and Sucralfate. He denies any notable improvement in discomfort. Also describes as a sharp pain which is worse with taking a deep breath. Does admit to exertional discomfort when going up steps and shortness of breath. Denies any recent fever, chills, sore throat, rhinorrhea, nausea, vomiting, diarrhea. No recent sick contacts. No recent travel. C/o L leg pain without swelling. Starts from lower back and radiates downward. Leaning on his right leg helps.    Social history: Smoking cigarettes x 15 years. 1 pack lasts 3 days.   Family history: No 1st degree relatives with coronary artery disease or cerebrovascular disease.    The following portions of the patient's history were reviewed and updated as appropriate: allergies, current medications, past family history, past medical history, past social history, past surgical history, and problem list.    SINCE LAST OV I REVIEWED WITH THE PATIENT THE INTERIM LABS, TEST RESULTS, CONSULTANT(S) NOTES AND PERFORMED AN INTERIM REVIEW OF HISTORY    History reviewed. No pertinent past medical history.    History reviewed. No pertinent surgical history.    Social History      Socioeconomic History    Marital status: Registered Domestic Partner     Spouse name: Not on file    Number of children: Not on file    Years of education: Not on file    Highest education level: Not on file   Occupational History    Not on file   Tobacco Use    Smoking status: Every Day     Current packs/day: 0.50     Average packs/day: 0.5 packs/day for 27.4 years (13.7 ttl pk-yrs)     Types: Cigarettes     Start date: 1997    Smokeless tobacco: Never   Vaping Use    Vaping status: Never Used   Substance and Sexual Activity    Alcohol use: Not Currently     Comment: holidays    Drug use: Yes     Frequency: 7.0 times per week     Types: Marijuana     Comment: 4 times a day    Sexual activity: Yes     Partners: Female   Other Topics Concern    Not on file   Social History Narrative    Not on file     Social Determinants of Health     Financial Resource Strain: Not on file   Food Insecurity: Not on file   Transportation Needs: Not on file   Physical Activity: Not on file   Stress: Not on file   Social Connections: Not on file   Intimate Partner Violence: Not on file   Housing Stability: Not on file       History reviewed. No pertinent family history.    Allergies   Allergen Reactions    Pollen Extract Other (See Comments)     Watery eyes       Current Outpatient Medications   Medication Sig Dispense Refill    celecoxib (CeleBREX) 100 mg capsule Take 1 capsule (100 mg total) by mouth 2 (two) times a day as needed for moderate pain 30 capsule 0    ergocalciferol (VITAMIN D2) 50,000 units Take 1 capsule (50,000 Units total) by mouth once a week 12 capsule 0    pantoprazole (PROTONIX) 40 mg tablet Take 1 tablet (40 mg total) by mouth daily 30 tablet 0    sucralfate (CARAFATE) 1 g tablet Take 1 tablet (1 g total) by mouth 4 (four) times a day 14 tablet 0    tiZANidine (ZANAFLEX) 4 mg tablet Take 1 tablet (4 mg total) by mouth 3 (three) times a day 30 tablet 0     No current facility-administered medications for this  "visit.       /66 (BP Location: Left arm, Patient Position: Sitting, Cuff Size: Standard)   Pulse 66   Ht 5' 6.5\" (1.689 m)   Wt 51.8 kg (114 lb 3.2 oz)   SpO2 90%   BMI 18.16 kg/m²     Review of Systems   All other systems reviewed and are negative.      Physical Exam  Vitals and nursing note reviewed.   Constitutional:       General: He is not in acute distress.  HENT:      Head: Normocephalic and atraumatic.   Eyes:      General: No scleral icterus.     Conjunctiva/sclera: Conjunctivae normal.   Cardiovascular:      Rate and Rhythm: Normal rate and regular rhythm.      Pulses: Normal pulses.      Heart sounds: Normal heart sounds. No murmur heard.     No friction rub. No gallop.   Pulmonary:      Effort: Pulmonary effort is normal. No respiratory distress.      Breath sounds: Normal breath sounds. No wheezing or rhonchi.   Abdominal:      General: Abdomen is flat. Bowel sounds are normal. There is no distension.      Palpations: Abdomen is soft.      Tenderness: There is abdominal tenderness (epigastric). There is no guarding.   Musculoskeletal:      Right lower leg: No edema.      Left lower leg: No edema.   Skin:     General: Skin is warm and dry.      Capillary Refill: Capillary refill takes less than 2 seconds.      Coloration: Skin is not jaundiced.   Neurological:      Mental Status: He is alert.   Psychiatric:         Mood and Affect: Mood normal.          Lab Results   Component Value Date    K 4.4 02/22/2024     02/22/2024    CO2 26 02/22/2024    BUN 20 02/22/2024    CREATININE 1.01 02/22/2024    CALCIUM 8.7 02/22/2024    ALT 13 02/22/2024    AST 16 02/22/2024       Lab Results   Component Value Date    HDL 41 02/22/2024    LDLCALC 117 (H) 02/22/2024    TRIG 74 02/22/2024       Lab Results   Component Value Date    WBC 7.53 02/22/2024    HGB 13.5 02/22/2024    HCT 42.2 02/22/2024     02/22/2024       Lab Results   Component Value Date    EAG 91 02/22/2021    HGBA1C 4.8 02/22/2021 "       Cardiac studies:   ECG - 5/1/24: NSR, LAD    ASSESSMENT AND PLAN:  Erik was seen today for new patient visit, back pain, chest pain, shortness of breath, fatigue and leg swelling.    Diagnoses and all orders for this visit:    Chest discomfort  -     Ambulatory Referral to Cardiology  -     Echo complete w/ contrast if indicated; Future  -     NM myocardial perfusion spect (rx stress and/or rest); Future    Current every day smoker    WEAVER (dyspnea on exertion)      48 year old male who is a current every day smoker who presents with chest pain. Describes a burning discomfort and sharp pain worse with inspiration. No rub on exam. ECG reviewed and no changes suggestive of pericarditis. Suspect discomfort may be GI related but does seem atypical. Reports discomfort with exertion. Check echo to rule out effusion. Given multiple GI complaints we will avoid an empiric trial of colchicine or NSAIDs for now. Check NM stress to rule out ischemia.    Bernie Schulz MD

## 2024-06-18 ENCOUNTER — HOSPITAL ENCOUNTER (OUTPATIENT)
Dept: NON INVASIVE DIAGNOSTICS | Facility: CLINIC | Age: 49
Discharge: HOME | End: 2024-06-18
Payer: COMMERCIAL

## 2024-06-18 ENCOUNTER — TELEPHONE (OUTPATIENT)
Age: 49
End: 2024-06-18

## 2024-06-18 VITALS
DIASTOLIC BLOOD PRESSURE: 66 MMHG | SYSTOLIC BLOOD PRESSURE: 112 MMHG | WEIGHT: 114 LBS | BODY MASS INDEX: 18.32 KG/M2 | HEART RATE: 66 BPM | HEIGHT: 66 IN

## 2024-06-18 VITALS
WEIGHT: 114 LBS | SYSTOLIC BLOOD PRESSURE: 118 MMHG | BODY MASS INDEX: 18.32 KG/M2 | OXYGEN SATURATION: 96 % | HEIGHT: 66 IN | DIASTOLIC BLOOD PRESSURE: 70 MMHG | HEART RATE: 64 BPM

## 2024-06-18 DIAGNOSIS — R07.89 CHEST DISCOMFORT: ICD-10-CM

## 2024-06-18 LAB
APICAL FOUR CHAMBER EJECTION FRACTION: 74 %
ASCENDING AORTA: 2.4 CM
CHEST PAIN STATEMENT: NORMAL
CHEST PAIN STATEMENT: NORMAL
E WAVE DECELERATION TIME: 153 MS
E/A RATIO: 2.21
LAAS-AP2: 14.2 CM2
LAAS-AP4: 15.7 CM2
LEFT ATRIUM VOLUME (MOD BIPLANE): 43 ML
LEFT ATRIUM VOLUME INDEX (MOD BIPLANE): 27.2 ML/M2
MAX DIASTOLIC BP: 84 MMHG
MAX DIASTOLIC BP: 84 MMHG
MAX PREDICTED HEART RATE: 174 BPM
MAX PREDICTED HEART RATE: 174 BPM
MV E'TISSUE VEL-SEP: 12 CM/S
MV PEAK A VEL: 0.47 M/S
MV PEAK E VEL: 104 CM/S
MV STENOSIS PRESSURE HALF TIME: 44 MS
MV VALVE AREA P 1/2 METHOD: 5
PROTOCOL NAME: NORMAL
PROTOCOL NAME: NORMAL
RATE PRESSURE PRODUCT: 234
REASON FOR TERMINATION: NORMAL
REASON FOR TERMINATION: NORMAL
RIGHT ATRIUM AREA SYSTOLE A4C: 9.1 CM2
RIGHT VENTRICLE ID DIMENSION: 3 CM
SL CV LEFT ATRIUM LENGTH A2C: 4.2 CM
SL CV LV EF: 74
SL CV REST NUCLEAR ISOTOPE DOSE: 10.9 MCI
SL CV STRESS NUCLEAR ISOTOPE DOSE: 32.6 MCI
SL CV STRESS RECOVERY BP: NORMAL MMHG
SL CV STRESS RECOVERY HR: 85 BPM
SL CV STRESS RECOVERY O2 SAT: 97 %
STRESS ANGINA INDEX: 0
STRESS BASELINE BP: NORMAL MMHG
STRESS BASELINE HR: 64 BPM
STRESS O2 SAT REST: 96 %
STRESS PEAK HR: 117 BPM
STRESS POST EXERCISE DUR MIN: 3 MIN
STRESS POST EXERCISE DUR MIN: 3 MIN
STRESS POST EXERCISE DUR SEC: 0 SEC
STRESS POST EXERCISE DUR SEC: 0 SEC
STRESS POST O2 SAT PEAK: 100 %
STRESS POST PEAK BP: 2 MMHG
STRESS POST PEAK HR: 118 BPM
STRESS POST PEAK HR: 118 BPM
STRESS POST PEAK SYSTOLIC BP: 142 MMHG
STRESS POST PEAK SYSTOLIC BP: 142 MMHG
TARGET HR FORMULA: NORMAL
TARGET HR FORMULA: NORMAL
TEST INDICATION: NORMAL
TEST INDICATION: NORMAL
TRICUSPID ANNULAR PLANE SYSTOLIC EXCURSION: 2.2 CM

## 2024-06-18 PROCEDURE — 93306 TTE W/DOPPLER COMPLETE: CPT | Performed by: INTERNAL MEDICINE

## 2024-06-18 PROCEDURE — A9502 TC99M TETROFOSMIN: HCPCS

## 2024-06-18 PROCEDURE — 93016 CV STRESS TEST SUPVJ ONLY: CPT | Performed by: INTERNAL MEDICINE

## 2024-06-18 PROCEDURE — 78452 HT MUSCLE IMAGE SPECT MULT: CPT

## 2024-06-18 PROCEDURE — 78452 HT MUSCLE IMAGE SPECT MULT: CPT | Performed by: INTERNAL MEDICINE

## 2024-06-18 PROCEDURE — 93017 CV STRESS TEST TRACING ONLY: CPT

## 2024-06-18 PROCEDURE — 93018 CV STRESS TEST I&R ONLY: CPT | Performed by: INTERNAL MEDICINE

## 2024-06-18 PROCEDURE — 93306 TTE W/DOPPLER COMPLETE: CPT

## 2024-06-18 RX ORDER — REGADENOSON 0.08 MG/ML
0.4 INJECTION, SOLUTION INTRAVENOUS ONCE
Status: COMPLETED | OUTPATIENT
Start: 2024-06-18 | End: 2024-06-18

## 2024-06-18 RX ADMIN — REGADENOSON 0.4 MG: 0.08 INJECTION, SOLUTION INTRAVENOUS at 12:44

## 2024-06-18 NOTE — TELEPHONE ENCOUNTER
Caller: Erik Cordon (Patient)      Doctor: Dr. Varma    Reason for call: PT had some test done today and would like a cb with results. I did advise that the doctor may have not read the results yet.     Call back#: [phone]

## 2024-06-18 NOTE — TELEPHONE ENCOUNTER
Spoke with patient's SO regarding test results. Results not reviewed by provider. Once reviewed office will reach out to him to let him know.

## 2024-06-25 ENCOUNTER — TELEPHONE (OUTPATIENT)
Dept: NEUROLOGY | Facility: CLINIC | Age: 49
End: 2024-06-25

## 2024-06-25 NOTE — TELEPHONE ENCOUNTER
Called pataient to reschedule EMG appointment from 7/23/24 as Tyrone will not be available.  No answer.  LVM.

## 2024-06-27 ENCOUNTER — PROCEDURE VISIT (OUTPATIENT)
Dept: NEUROLOGY | Facility: CLINIC | Age: 49
End: 2024-06-27
Payer: COMMERCIAL

## 2024-06-27 DIAGNOSIS — M54.16 LUMBAR RADICULOPATHY: ICD-10-CM

## 2024-06-27 PROCEDURE — 95909 NRV CNDJ TST 5-6 STUDIES: CPT | Performed by: PHYSICAL MEDICINE & REHABILITATION

## 2024-06-27 PROCEDURE — 95886 MUSC TEST DONE W/N TEST COMP: CPT | Performed by: PHYSICAL MEDICINE & REHABILITATION

## 2024-06-28 NOTE — PROGRESS NOTES
"As provider was walking patient out of patient area, patient looks to Dr. Tyrone Goldberg and stated \"sorry I had to be black.\" Referencing his behavior during the procedure. Provider requested documentation for this encounter.  "

## 2024-06-28 NOTE — PROGRESS NOTES
Patient came for EMG procedure today.He was accompanied by a family member.Both patient and family member were very anxious.Patient stated he was very cold and sweaty.Attempt was made to increase the room temperature and comfort the patient.Office staff was called for assistance.Extra blanket was given to patient.Patient was talking about contacting his  even before procedure was started.Before leaving the office patient stated he will call the  if report was not in chart today.

## 2024-06-30 LAB
CHEST PAIN STATEMENT: NORMAL
MAX DIASTOLIC BP: 84 MMHG
MAX PREDICTED HEART RATE: 174 BPM
PROTOCOL NAME: NORMAL
REASON FOR TERMINATION: NORMAL
STRESS POST EXERCISE DUR MIN: 3 MIN
STRESS POST EXERCISE DUR SEC: 0 SEC
STRESS POST PEAK HR: 118 BPM
STRESS POST PEAK SYSTOLIC BP: 142 MMHG
TARGET HR FORMULA: NORMAL
TEST INDICATION: NORMAL

## 2024-07-02 ENCOUNTER — TELEPHONE (OUTPATIENT)
Dept: NEUROLOGY | Facility: CLINIC | Age: 49
End: 2024-07-02

## 2024-07-02 NOTE — TELEPHONE ENCOUNTER
Pt spouse called she received a message this morning but didn't know from who. 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 Informed her no notes as to who called and will not be able to know why pt was called.

## 2024-07-08 ENCOUNTER — TELEPHONE (OUTPATIENT)
Age: 49
End: 2024-07-08

## 2024-07-08 NOTE — TELEPHONE ENCOUNTER
Caller: patient spouse johnie     Doctor: buddy    Reason for call: patient spouse would like a call back regarding mri result    Call back#:

## 2024-07-09 DIAGNOSIS — M54.16 LUMBAR RADICULOPATHY: Primary | ICD-10-CM

## 2024-07-09 NOTE — TELEPHONE ENCOUNTER
Negrito Masters MD  [facility]  EMG shows chronic nerve irritation coming from the back. May explain calf pain. Can consider LESI. It is possible that he may have had a disc herniation that irritated the nerve and went back into place because MRI does not show any clear nerve compression.

## 2024-07-09 NOTE — TELEPHONE ENCOUNTER
S/W Wife Leila per [employer] and advised of results and next step    Would like to schedule LESI because pt is still experiencing leg pain  Please place order

## 2024-07-11 ENCOUNTER — PATIENT MESSAGE (OUTPATIENT)
Dept: RADIOLOGY | Facility: CLINIC | Age: 49
End: 2024-07-11

## 2024-07-11 NOTE — PATIENT COMMUNICATION
Pt is scheduled for LESI with Dr Masters on 8/14    Pt is not diabetic and denies taking prescription blood thinners or full dose aspirin    Pts wife given instructions over phone and pt sent follow up message via [employer]    Have you completed PT/HEP/Chiro in the past 6 months for dedicated area? PT with  Cheryl from 3/24/24 through 4/26/24 -- per last PT note, pt reported PT made his pain worse  If yes, how long did you complete?  What was the frequency?  Did it provide relief?  If no, reason therapy was not completed?

## 2024-07-22 DIAGNOSIS — S39.012A LUMBAR STRAIN, INITIAL ENCOUNTER: ICD-10-CM

## 2024-07-22 DIAGNOSIS — M79.605 LEFT LEG PAIN: ICD-10-CM

## 2024-07-22 DIAGNOSIS — E55.9 VITAMIN D DEFICIENCY: ICD-10-CM

## 2024-07-22 DIAGNOSIS — R07.89 CHEST DISCOMFORT: ICD-10-CM

## 2024-07-22 RX ORDER — PANTOPRAZOLE SODIUM 40 MG/1
40 TABLET, DELAYED RELEASE ORAL DAILY
Qty: 30 TABLET | Refills: 5 | Status: SHIPPED | OUTPATIENT
Start: 2024-07-22

## 2024-07-22 RX ORDER — CELECOXIB 100 MG/1
100 CAPSULE ORAL 2 TIMES DAILY PRN
Qty: 30 CAPSULE | Refills: 0 | Status: CANCELLED | OUTPATIENT
Start: 2024-07-22

## 2024-07-23 DIAGNOSIS — M54.16 LUMBAR RADICULOPATHY: Primary | ICD-10-CM

## 2024-07-23 RX ORDER — ERGOCALCIFEROL 1.25 MG/1
50000 CAPSULE ORAL WEEKLY
Qty: 12 CAPSULE | Refills: 0 | OUTPATIENT
Start: 2024-07-23

## 2024-07-23 RX ORDER — GABAPENTIN 300 MG/1
300 CAPSULE ORAL 3 TIMES DAILY
Qty: 90 CAPSULE | Refills: 0 | Status: SHIPPED | OUTPATIENT
Start: 2024-07-23

## 2024-07-23 RX ORDER — SUCRALFATE 1 G/1
1 TABLET ORAL 4 TIMES DAILY
Qty: 14 TABLET | Refills: 0 | OUTPATIENT
Start: 2024-07-23

## 2024-07-23 RX ORDER — TIZANIDINE 4 MG/1
4 TABLET ORAL 3 TIMES DAILY
Qty: 30 TABLET | Refills: 0 | Status: SHIPPED | OUTPATIENT
Start: 2024-07-23

## 2024-08-12 ENCOUNTER — OFFICE VISIT (OUTPATIENT)
Dept: GASTROENTEROLOGY | Facility: CLINIC | Age: 49
End: 2024-08-12
Payer: COMMERCIAL

## 2024-08-12 VITALS
TEMPERATURE: 97.5 F | HEIGHT: 66 IN | HEART RATE: 66 BPM | RESPIRATION RATE: 18 BRPM | WEIGHT: 115.4 LBS | DIASTOLIC BLOOD PRESSURE: 76 MMHG | OXYGEN SATURATION: 99 % | BODY MASS INDEX: 18.54 KG/M2 | SYSTOLIC BLOOD PRESSURE: 120 MMHG

## 2024-08-12 DIAGNOSIS — K92.1 HEMATOCHEZIA: ICD-10-CM

## 2024-08-12 DIAGNOSIS — K21.9 GASTROESOPHAGEAL REFLUX DISEASE WITHOUT ESOPHAGITIS: ICD-10-CM

## 2024-08-12 DIAGNOSIS — Z12.11 SCREENING FOR COLON CANCER: Primary | ICD-10-CM

## 2024-08-12 PROCEDURE — 99203 OFFICE O/P NEW LOW 30 MIN: CPT | Performed by: INTERNAL MEDICINE

## 2024-08-12 NOTE — PATIENT INSTRUCTIONS
Scheduled date of colonoscopy (as of today):8/16/24  Physician performing colonoscopy:Rahul  Location of colonoscopy:[place]  Bowel prep reviewed with patient:Tanvir/Miralax  Instructions reviewed with patient by:Aki patel  Clearances:  none

## 2024-08-12 NOTE — H&P (VIEW-ONLY)
[facility] - Outpatient Note  Erik Cordon 48 y.o. male MRN: [id number]  Encounter: [id number]      ASSESSMENT AND PLAN:    Erik Cordon is a 48 y.o. old pleasant male with PMH of marijuana use, tobacco use who presents for consultation for hematochezia    Hematochezia, colon cancer screening-his hematochezia sounds hemorrhoidal given it is mainly with wiping and bright red blood.  No previous colonoscopy.  No family history of colon cancer.  Plan for colonoscopy      GERD -rare heartburn symptoms not on medication.  Diet controlled.  GERD lifestyle changes discussed, including avoidance of trigger foods (potential foods include coffee, caffeine, chocolate, mint, tomato-based products, spicy foods, fatty foods), avoid tight fitting clothing, elevated head of bed 30 degrees, avoid eating 2-3 hours prior to bedtime, weight loss, avoid alcohol, avoid tobacco use.      1. Screening for colon cancer    - Colonoscopy; Future    2. Hematochezia      ______________________________________________________________________    SUBJECTIVE: Intermittent hematochezia for the past several months mainly with wiping bright red blood.  Denies abdominal pain nausea vomiting.  History of GERD diet controlled.  No previous EGD or colonoscopy.  Does use marijuana daily and smokes cigarettes daily.  No NSAIDs or blood thinners.      I reviewed prior external notes    I reviewed previous lab results and images      REVIEW OF SYSTEMS:     REVIEW OF ALL OTHER SYSTEMS IS OTHERWISE NEGATIVE.      Historical Information   Past Medical History:   Diagnosis Date    GERD (gastroesophageal reflux disease)      History reviewed. No pertinent surgical history.  Social History   Social History     Substance and Sexual Activity   Alcohol Use Not Currently    Comment: holidays     Social History     Substance and Sexual Activity   Drug Use Yes    Frequency: 7.0 times per week    Types: Marijuana     "Comment: 4 times a day     Social History     Tobacco Use   Smoking Status Every Day    Current packs/day: 0.50    Average packs/day: 0.5 packs/day for 27.6 years (13.8 ttl pk-yrs)    Types: Cigarettes    Start date: 1/1/1997   Smokeless Tobacco Never     Family History   Problem Relation Age of Onset    No Known Problems Mother     No Known Problems Father     No Known Problems Sister     No Known Problems Brother     No Known Problems Maternal Grandmother     No Known Problems Maternal Grandfather     No Known Problems Paternal Grandmother     No Known Problems Paternal Grandfather     No Known Problems Maternal Aunt     No Known Problems Maternal Uncle     No Known Problems Paternal Aunt     No Known Problems Paternal Uncle     No Known Problems Cousin        Meds/Allergies       Current Outpatient Medications:     celecoxib (CeleBREX) 100 mg capsule    ergocalciferol (VITAMIN D2) 50,000 units    gabapentin (NEURONTIN) 300 mg capsule    pantoprazole (PROTONIX) 40 mg tablet    sucralfate (CARAFATE) 1 g tablet    tiZANidine (ZANAFLEX) 4 mg tablet    Allergies   Allergen Reactions    Pollen Extract Other (See Comments)     Watery eyes           Objective     Blood pressure 120/76, pulse 66, temperature 97.5 °F (36.4 °C), temperature source Tympanic, resp. rate 18, height 5' 6\" (1.676 m), weight 52.3 kg (115 lb 6.4 oz), SpO2 99%. Body mass index is 18.63 kg/m².    PHYSICAL EXAM:      General Appearance:   Alert, cooperative, no distress   HEENT:   Normocephalic, atraumatic, anicteric.     Neck:  Supple, symmetrical, trachea midline   Lungs:   Clear to auscultation bilaterally; no rales, rhonchi or wheezing; respirations unlabored    Heart::   Regular rate and rhythm; no murmur, rub, or gallop.   Abdomen:   Soft, non-tender, non-distended; normal bowel sounds; no masses, no organomegaly    Genitalia:   Deferred    Rectal:   Deferred    Extremities:  No cyanosis, clubbing or edema    Pulses:  2+ and symmetric    Skin:  " No jaundice, rashes, or lesions    Lymph nodes:  No palpable cervical lymphadenopathy        Lab Results:   No visits with results within 1 Day(s) from this visit.   Latest known visit with results is:   Hospital Outpatient Visit on 06/18/2024   Component Date Value    Protocol Name 06/18/2024 [facility]- WALK     Exercise duration (min) 06/18/2024 3     Exercise duration (sec) 06/18/2024 0     Post Peak Systolic BP 06/18/2024 142     Max Diastolic Bp 06/18/2024 84     Peak HR 06/18/2024 118     Max Predicted Heart Rate 06/18/2024 174     Reason for Termination 06/18/2024 Protocol Complete     Test Indication 06/18/2024 CHEST PAIN     Target Hr Formular 06/18/2024 (220 - Age)*85%     Chest Pain Statement 06/18/2024 none          Radiology Results:   No results found.

## 2024-08-12 NOTE — PROGRESS NOTES
[facility] - Outpatient Note  Erik Cordon 48 y.o. male MRN: [id number]  Encounter: [id number]      ASSESSMENT AND PLAN:    Erik Cordon is a 48 y.o. old pleasant male with PMH of marijuana use, tobacco use who presents for consultation for hematochezia    Hematochezia, colon cancer screening-his hematochezia sounds hemorrhoidal given it is mainly with wiping and bright red blood.  No previous colonoscopy.  No family history of colon cancer.  Plan for colonoscopy      GERD -rare heartburn symptoms not on medication.  Diet controlled.  GERD lifestyle changes discussed, including avoidance of trigger foods (potential foods include coffee, caffeine, chocolate, mint, tomato-based products, spicy foods, fatty foods), avoid tight fitting clothing, elevated head of bed 30 degrees, avoid eating 2-3 hours prior to bedtime, weight loss, avoid alcohol, avoid tobacco use.      1. Screening for colon cancer    - Colonoscopy; Future    2. Hematochezia      ______________________________________________________________________    SUBJECTIVE: Intermittent hematochezia for the past several months mainly with wiping bright red blood.  Denies abdominal pain nausea vomiting.  History of GERD diet controlled.  No previous EGD or colonoscopy.  Does use marijuana daily and smokes cigarettes daily.  No NSAIDs or blood thinners.      I reviewed prior external notes    I reviewed previous lab results and images      REVIEW OF SYSTEMS:     REVIEW OF ALL OTHER SYSTEMS IS OTHERWISE NEGATIVE.      Historical Information   Past Medical History:   Diagnosis Date    GERD (gastroesophageal reflux disease)      History reviewed. No pertinent surgical history.  Social History   Social History     Substance and Sexual Activity   Alcohol Use Not Currently    Comment: holidays     Social History     Substance and Sexual Activity   Drug Use Yes    Frequency: 7.0 times per week    Types: Marijuana     "Comment: 4 times a day     Social History     Tobacco Use   Smoking Status Every Day    Current packs/day: 0.50    Average packs/day: 0.5 packs/day for 27.6 years (13.8 ttl pk-yrs)    Types: Cigarettes    Start date: 1/1/1997   Smokeless Tobacco Never     Family History   Problem Relation Age of Onset    No Known Problems Mother     No Known Problems Father     No Known Problems Sister     No Known Problems Brother     No Known Problems Maternal Grandmother     No Known Problems Maternal Grandfather     No Known Problems Paternal Grandmother     No Known Problems Paternal Grandfather     No Known Problems Maternal Aunt     No Known Problems Maternal Uncle     No Known Problems Paternal Aunt     No Known Problems Paternal Uncle     No Known Problems Cousin        Meds/Allergies       Current Outpatient Medications:     celecoxib (CeleBREX) 100 mg capsule    ergocalciferol (VITAMIN D2) 50,000 units    gabapentin (NEURONTIN) 300 mg capsule    pantoprazole (PROTONIX) 40 mg tablet    sucralfate (CARAFATE) 1 g tablet    tiZANidine (ZANAFLEX) 4 mg tablet    Allergies   Allergen Reactions    Pollen Extract Other (See Comments)     Watery eyes           Objective     Blood pressure 120/76, pulse 66, temperature 97.5 °F (36.4 °C), temperature source Tympanic, resp. rate 18, height 5' 6\" (1.676 m), weight 52.3 kg (115 lb 6.4 oz), SpO2 99%. Body mass index is 18.63 kg/m².    PHYSICAL EXAM:      General Appearance:   Alert, cooperative, no distress   HEENT:   Normocephalic, atraumatic, anicteric.     Neck:  Supple, symmetrical, trachea midline   Lungs:   Clear to auscultation bilaterally; no rales, rhonchi or wheezing; respirations unlabored    Heart::   Regular rate and rhythm; no murmur, rub, or gallop.   Abdomen:   Soft, non-tender, non-distended; normal bowel sounds; no masses, no organomegaly    Genitalia:   Deferred    Rectal:   Deferred    Extremities:  No cyanosis, clubbing or edema    Pulses:  2+ and symmetric    Skin:  " No jaundice, rashes, or lesions    Lymph nodes:  No palpable cervical lymphadenopathy        Lab Results:   No visits with results within 1 Day(s) from this visit.   Latest known visit with results is:   Hospital Outpatient Visit on 06/18/2024   Component Date Value    Protocol Name 06/18/2024 [facility]- WALK     Exercise duration (min) 06/18/2024 3     Exercise duration (sec) 06/18/2024 0     Post Peak Systolic BP 06/18/2024 142     Max Diastolic Bp 06/18/2024 84     Peak HR 06/18/2024 118     Max Predicted Heart Rate 06/18/2024 174     Reason for Termination 06/18/2024 Protocol Complete     Test Indication 06/18/2024 CHEST PAIN     Target Hr Formular 06/18/2024 (220 - Age)*85%     Chest Pain Statement 06/18/2024 none          Radiology Results:   No results found.

## 2024-08-14 ENCOUNTER — HOSPITAL ENCOUNTER (OUTPATIENT)
Dept: RADIOLOGY | Facility: CLINIC | Age: 49
Discharge: HOME | End: 2024-08-14
Payer: COMMERCIAL

## 2024-08-14 VITALS
SYSTOLIC BLOOD PRESSURE: 113 MMHG | TEMPERATURE: 96.5 F | HEART RATE: 78 BPM | DIASTOLIC BLOOD PRESSURE: 83 MMHG | RESPIRATION RATE: 20 BRPM | OXYGEN SATURATION: 99 %

## 2024-08-14 DIAGNOSIS — M54.16 LUMBAR RADICULOPATHY: ICD-10-CM

## 2024-08-14 PROCEDURE — 62323 NJX INTERLAMINAR LMBR/SAC: CPT | Performed by: STUDENT IN AN ORGANIZED HEALTH CARE EDUCATION/TRAINING PROGRAM

## 2024-08-14 RX ORDER — METHYLPREDNISOLONE ACETATE 80 MG/ML
80 INJECTION, SUSPENSION INTRA-ARTICULAR; INTRALESIONAL; INTRAMUSCULAR; PARENTERAL; SOFT TISSUE ONCE
Status: COMPLETED | OUTPATIENT
Start: 2024-08-14 | End: 2024-08-14

## 2024-08-14 RX ADMIN — METHYLPREDNISOLONE ACETATE 80 MG: 80 INJECTION, SUSPENSION INTRA-ARTICULAR; INTRALESIONAL; INTRAMUSCULAR; PARENTERAL; SOFT TISSUE at 11:20

## 2024-08-14 RX ADMIN — IOHEXOL 1 ML: 300 INJECTION, SOLUTION INTRAVENOUS at 11:19

## 2024-08-14 NOTE — H&P
History of Present Illness: The patient is a 48 y.o. male who presents with complaints of bck and leg pain    Past Medical History:   Diagnosis Date    GERD (gastroesophageal reflux disease)        No past surgical history on file.      Current Outpatient Medications:     celecoxib (CeleBREX) 100 mg capsule, Take 1 capsule (100 mg total) by mouth 2 (two) times a day as needed for moderate pain, Disp: 30 capsule, Rfl: 0    ergocalciferol (VITAMIN D2) 50,000 units, Take 1 capsule (50,000 Units total) by mouth once a week, Disp: 12 capsule, Rfl: 0    gabapentin (NEURONTIN) 300 mg capsule, Take 1 capsule (300 mg total) by mouth 3 (three) times a day Start by taking 1 tablet at bedtime for 3 days.  Then increase to 1 tablet in the evening and 1 tablet at bedtime for 3 days.  Then increase to 1 tablet in the morning, 1 tablet in the evening, and 1 tablet at bedtime thereafter., Disp: 90 capsule, Rfl: 0    pantoprazole (PROTONIX) 40 mg tablet, Take 1 tablet (40 mg total) by mouth daily, Disp: 30 tablet, Rfl: 5    sucralfate (CARAFATE) 1 g tablet, Take 1 tablet (1 g total) by mouth 4 (four) times a day, Disp: 14 tablet, Rfl: 0    tiZANidine (ZANAFLEX) 4 mg tablet, Take 1 tablet (4 mg total) by mouth 3 (three) times a day, Disp: 30 tablet, Rfl: 0    Allergies   Allergen Reactions    Pollen Extract Other (See Comments)     Watery eyes       Physical Exam:   Vitals:    08/14/24 1056   BP: 100/64   Pulse:    Resp:    Temp:    SpO2:      General: Awake, Alert, Oriented x 3, Mood and affect appropriate  Respiratory: Respirations even and unlabored  Cardiovascular: Peripheral pulses intact; no edema  Musculoskeletal Exam: back non erythematous no lesions    ASA Score: 3    Patient/Chart Verification  Patient ID Verified: Verbal  ID Band Applied: No  Consents Confirmed: To be obtained in the Pre-Procedure area  H&P( within 30 days) Verified: To be obtained in the Pre-Procedure area  Interval H&P(within 24 hr) Complete (required for  Outpatients and Surgery Admit only): To be obtained in the Pre-Procedure area  Allergies Reviewed: Yes  Anticoag/NSAID held?: NA  Currently on antibiotics?: No  Pregnancy denied?: NA    Assessment:   1. Lumbar radiculopathy        Plan: L4-5 BRANDON

## 2024-08-14 NOTE — DISCHARGE INSTR - LAB
Epidural Steroid Injection   WHAT YOU NEED TO KNOW:   An epidural steroid injection (TIERA) is a procedure to inject steroid medicine into the epidural space. The epidural space is between your spinal cord and vertebrae. Steroids reduce inflammation and fluid buildup in your spine that may be causing pain. You may be given pain medicine along with the steroids.          ACTIVITY  Do not drive or operate machinery today.  No strenuous activity today - bending, lifting, etc.  You may resume normal activites starting tomorrow - start slowly and as tolerated.  You may shower today, but no tub baths or hot tubs.  You may have numbness for several hours from the local anesthetic. Please use caution and common sense, especially with weight-bearing activities.    CARE OF THE INJECTION SITE  If you have soreness or pain, apply ice to the area today (20 minutes on/20 minutes off).  Starting tomorrow, you may use warm, moist heat or ice if needed.  You may have an increase or change in your discomfort for 36-48 hours after your treatment.  Apply ice and continue with any pain medication you have been prescribed.  Notify the [facility] if you have any of the following: redness, drainage, swelling, headache, stiff neck or fever above 100°F.    SPECIAL INSTRUCTIONS  Our office will contact you in approximately 14 days for a progress report.    MEDICATIONS  Continue to take all routine medications.  Our office may have instructed you to hold some medications.    As no general anesthesia was used in today's procedure, you should not experience any side effects related to anesthesia.     If you are diabetic, the steroids used in today's injection may temporarily increase your blood sugar levels after the first few days after your injection. Please keep a close eye on your sugars and alert the doctor who manages your diabetes if your sugars are significantly high from your baseline or you are symptomatic.     If you have a  problem specifically related to your procedure, please call our office at [phone].  Problems not related to your procedure should be directed to your primary care physician.

## 2024-08-16 ENCOUNTER — HOSPITAL ENCOUNTER (OUTPATIENT)
Dept: GASTROENTEROLOGY | Facility: HOSPITAL | Age: 49
Setting detail: OUTPATIENT SURGERY
Discharge: HOME | End: 2024-08-16
Attending: INTERNAL MEDICINE
Payer: COMMERCIAL

## 2024-08-16 ENCOUNTER — ANESTHESIA EVENT (OUTPATIENT)
Dept: GASTROENTEROLOGY | Facility: HOSPITAL | Age: 49
End: 2024-08-16

## 2024-08-16 ENCOUNTER — ANESTHESIA (OUTPATIENT)
Dept: GASTROENTEROLOGY | Facility: HOSPITAL | Age: 49
End: 2024-08-16

## 2024-08-16 VITALS
OXYGEN SATURATION: 100 % | HEIGHT: 66 IN | HEART RATE: 62 BPM | WEIGHT: 110.67 LBS | TEMPERATURE: 97.4 F | DIASTOLIC BLOOD PRESSURE: 78 MMHG | BODY MASS INDEX: 17.79 KG/M2 | RESPIRATION RATE: 20 BRPM | SYSTOLIC BLOOD PRESSURE: 171 MMHG

## 2024-08-16 DIAGNOSIS — Z12.11 SCREENING FOR COLON CANCER: ICD-10-CM

## 2024-08-16 DIAGNOSIS — K64.9 HEMORRHOIDS, UNSPECIFIED HEMORRHOID TYPE: Primary | ICD-10-CM

## 2024-08-16 PROBLEM — F17.200 CURRENT EVERY DAY SMOKER: Status: ACTIVE | Noted: 2024-08-16

## 2024-08-16 PROCEDURE — 88305 TISSUE EXAM BY PATHOLOGIST: CPT | Performed by: STUDENT IN AN ORGANIZED HEALTH CARE EDUCATION/TRAINING PROGRAM

## 2024-08-16 PROCEDURE — 45380 COLONOSCOPY AND BIOPSY: CPT | Performed by: INTERNAL MEDICINE

## 2024-08-16 RX ORDER — HYDROCORTISONE 25 MG/G
CREAM TOPICAL 2 TIMES DAILY
Qty: 30 G | Refills: 2 | Status: SHIPPED | OUTPATIENT
Start: 2024-08-16 | End: 2024-08-30

## 2024-08-16 RX ORDER — PROPOFOL 10 MG/ML
INJECTION, EMULSION INTRAVENOUS AS NEEDED
Status: DISCONTINUED | OUTPATIENT
Start: 2024-08-16 | End: 2024-08-16

## 2024-08-16 RX ORDER — LIDOCAINE HYDROCHLORIDE 20 MG/ML
INJECTION, SOLUTION EPIDURAL; INFILTRATION; INTRACAUDAL; PERINEURAL AS NEEDED
Status: DISCONTINUED | OUTPATIENT
Start: 2024-08-16 | End: 2024-08-16

## 2024-08-16 RX ADMIN — PROPOFOL 80 MG: 10 INJECTION, EMULSION INTRAVENOUS at 13:06

## 2024-08-16 RX ADMIN — PROPOFOL 50 MG: 10 INJECTION, EMULSION INTRAVENOUS at 13:18

## 2024-08-16 RX ADMIN — LIDOCAINE HYDROCHLORIDE 50 MG: 20 INJECTION, SOLUTION EPIDURAL; INFILTRATION; INTRACAUDAL; PERINEURAL at 13:06

## 2024-08-16 RX ADMIN — PROPOFOL 40 MG: 10 INJECTION, EMULSION INTRAVENOUS at 13:08

## 2024-08-16 NOTE — ANESTHESIA PREPROCEDURE EVALUATION
"Procedure:  COLONOSCOPY    BMI 18    Relevant Problems   GI/HEPATIC   (+) GERD (gastroesophageal reflux disease)      Behavioral Health   (+) Current every day smoker        Physical Exam    Airway    Mallampati score: II  TM Distance: >3 FB  Neck ROM: full     Dental       Cardiovascular      Pulmonary      Other Findings        Anesthesia Plan  ASA Score- 2     Anesthesia Type- IV sedation with anesthesia with ASA Monitors.         Additional Monitors:     Airway Plan:     Comment: Recent labs personally reviewed:  Lab Results       Component                Value               Date                       WBC                      7.53                02/22/2024                 HGB                      13.5                02/22/2024                 PLT                      181                 02/22/2024            Lab Results       Component                Value               Date                       K                        4.4                 02/22/2024                 BUN                      20                  02/22/2024                 CREATININE               1.01                02/22/2024            No results found for: \"PTT\"   No results found for: \"INR\"    Blood type     I, Ronda Huff MD, have personally seen and evaluated the patient prior to anesthetic care.  I have reviewed the pre-anesthetic record, medical history, allergies, medications and any other medical records if appropriate to the anesthetic care.  If a CRNA is involved in the case, I have reviewed the CRNA assessment, if present, and agree. Patient consented for IV Sedation, general anesthesia as back up. Discussed risks of aspiration, IV infiltration, indications for conversion to general anesthesia. All questions and concerns addressed.     .       Plan Factors-Exercise tolerance (METS): >4 METS.    Chart reviewed.   Existing labs reviewed. Patient summary reviewed.    Patient is a current smoker.  Patient instructed to abstain from " smoking on day of procedure. Patient smoked on day of surgery.    Obstructive sleep apnea risk education given perioperatively.        Induction- intravenous.    Postoperative Plan-         Informed Consent- Anesthetic plan and risks discussed with patient.  I personally reviewed this patient with the CRNA. Discussed and agreed on the Anesthesia Plan with the CRNA..

## 2024-08-16 NOTE — ANESTHESIA POSTPROCEDURE EVALUATION
Post-Op Assessment Note    CV Status:  Stable  Pain Score: 0    Pain management: adequate       Mental Status:  Alert and awake   Hydration Status:  Euvolemic   PONV Controlled:  Controlled   Airway Patency:  Patent     Post Op Vitals Reviewed: Yes    No anethesia notable event occurred.    Staff: CRNA               /55 (08/16/24 1322)    Temp (!) 97.4 °F (36.3 °C) (08/16/24 1322)    Pulse 68 (08/16/24 1322)   Resp 18 (08/16/24 1322)    SpO2 100 % (08/16/24 1322)

## 2024-08-19 PROCEDURE — 88305 TISSUE EXAM BY PATHOLOGIST: CPT | Performed by: STUDENT IN AN ORGANIZED HEALTH CARE EDUCATION/TRAINING PROGRAM

## 2024-10-25 ENCOUNTER — TELEPHONE (OUTPATIENT)
Dept: PAIN MEDICINE | Facility: CLINIC | Age: 49
End: 2024-10-25

## 2024-10-25 NOTE — TELEPHONE ENCOUNTER
S/w Leila per communication consent, advised that SP will not be here at the time of his appt today. Moved appt to 11/11 advised I will put on cancellation list incase sooner appt opens up

## 2024-11-06 ENCOUNTER — VBI (OUTPATIENT)
Dept: ADMINISTRATIVE | Facility: OTHER | Age: 49
End: 2024-11-06

## 2024-11-06 NOTE — TELEPHONE ENCOUNTER
11/06/24 10:01 AM     Chart reviewed for Pap Smear (HPV) aka Cervical Cancer Screening was/were not submitted to the patient's insurance.     Carol Dai MA   [facility]

## 2024-11-08 ENCOUNTER — DOCUMENTATION (OUTPATIENT)
Dept: PAIN MEDICINE | Facility: CLINIC | Age: 49
End: 2024-11-08

## 2024-11-11 ENCOUNTER — OFFICE VISIT (OUTPATIENT)
Dept: PAIN MEDICINE | Facility: CLINIC | Age: 49
End: 2024-11-11
Payer: COMMERCIAL

## 2024-11-11 VITALS
HEART RATE: 86 BPM | BODY MASS INDEX: 17.79 KG/M2 | DIASTOLIC BLOOD PRESSURE: 135 MMHG | HEIGHT: 66 IN | SYSTOLIC BLOOD PRESSURE: 153 MMHG | WEIGHT: 110.67 LBS

## 2024-11-11 DIAGNOSIS — M54.42 CHRONIC BILATERAL LOW BACK PAIN WITH LEFT-SIDED SCIATICA: Primary | ICD-10-CM

## 2024-11-11 DIAGNOSIS — M48.061 SPINAL STENOSIS OF LUMBAR REGION, UNSPECIFIED WHETHER NEUROGENIC CLAUDICATION PRESENT: ICD-10-CM

## 2024-11-11 DIAGNOSIS — G89.29 CHRONIC BILATERAL LOW BACK PAIN WITH LEFT-SIDED SCIATICA: Primary | ICD-10-CM

## 2024-11-11 PROCEDURE — 99214 OFFICE O/P EST MOD 30 MIN: CPT | Performed by: STUDENT IN AN ORGANIZED HEALTH CARE EDUCATION/TRAINING PROGRAM

## 2024-11-11 NOTE — PROGRESS NOTES
Assessment:  1. Chronic bilateral low back pain with left-sided sciatica    2. Spinal stenosis of lumbar region, unspecified whether neurogenic claudication present        Plan:    49-year-old male returns her office following epidural injection with ongoing low back and left extremity radicular symptoms.  I advised him to stop gabapentin due to it making him drowsy.  He has tried physical therapy and injection treatments with little relief.  He does have developmental canal stenosis and ongoing sciatic symptoms left lower extremity.  I would like to have him at least have a consultation with spine surgery to rule out any procedural options that may help him.  He will return as needed.    [place] Prescription Drug Monitoring Program report was reviewed and was appropriate     Complete risks and benefits including bleeding, infection, tissue reaction, nerve injury and allergic reaction were discussed. The approach was demonstrated using models and literature was provided. Verbal and written consent was obtained.    My impressions and treatment recommendations were discussed in detail with the patient who verbalized understanding and had no further questions.  Discharge instructions were provided. I personally saw and examined the patient and I agree with the above discussed plan of care.    Orders Placed This Encounter   Procedures    Ambulatory referral to Orthopedic Surgery     Standing Status:   Future     Standing Expiration Date:   11/11/2025     Referral Priority:   Routine     Referral Type:   Consult - AMB     Referral Reason:   Specialty Services Required     Referred to Provider:   Sparkle Augustin MD     Requested Specialty:   Orthopedic Surgery     Number of Visits Requested:   1     Expiration Date:   11/11/2025     No orders of the defined types were placed in this encounter.      History of Present Illness:  Erik Cordon is a 49 y.o. male who presents for a follow up office visit in regards  to Back Pain and Hip Pain (B/L HIP).   The patient’s current symptoms include bilateral low back pain.    Last seen for LESI.  No relief.  8 out of 10 pain today.  Morning, evening, nighttime.  Pain is constant, sharp and shooting in nature. Pain is across the leg and still has pain in the left leg.     I have personally reviewed and/or updated the patient's past medical history, past surgical history, family history, social history, current medications, allergies, and vital signs today.     Review of Systems   Musculoskeletal:  Positive for gait problem.        OUMAR         Patient Active Problem List   Diagnosis    Left leg pain    Preventative health care    GERD (gastroesophageal reflux disease)    Current every day smoker       Past Medical History:   Diagnosis Date    Chronic pain disorder     to back down to knees    GERD (gastroesophageal reflux disease)     Shortness of breath     with ambulation       Past Surgical History:   Procedure Laterality Date    LUMBAR EPIDURAL INJECTION      pain shot       Family History   Problem Relation Age of Onset    No Known Problems Mother     No Known Problems Father     No Known Problems Sister     No Known Problems Brother     No Known Problems Maternal Grandmother     No Known Problems Maternal Grandfather     No Known Problems Paternal Grandmother     No Known Problems Paternal Grandfather     No Known Problems Maternal Aunt     No Known Problems Maternal Uncle     No Known Problems Paternal Aunt     No Known Problems Paternal Uncle     No Known Problems Cousin        Social History     Occupational History    Not on file   Tobacco Use    Smoking status: Every Day     Current packs/day: 0.50     Average packs/day: 0.5 packs/day for 27.9 years (13.9 ttl pk-yrs)     Types: Cigarettes     Start date: 1/1/1997    Smokeless tobacco: Never   Vaping Use    Vaping status: Never Used   Substance and Sexual Activity    Alcohol use: Not Currently     Comment: holidays    Drug  "use: Yes     Frequency: 7.0 times per week     Types: Marijuana     Comment: 4 times a day    Sexual activity: Yes     Partners: Female       Current Outpatient Medications on File Prior to Visit   Medication Sig    celecoxib (CeleBREX) 100 mg capsule Take 1 capsule (100 mg total) by mouth 2 (two) times a day as needed for moderate pain    ergocalciferol (VITAMIN D2) 50,000 units Take 1 capsule (50,000 Units total) by mouth once a week    pantoprazole (PROTONIX) 40 mg tablet Take 1 tablet (40 mg total) by mouth daily    sucralfate (CARAFATE) 1 g tablet Take 1 tablet (1 g total) by mouth 4 (four) times a day    tiZANidine (ZANAFLEX) 4 mg tablet Take 1 tablet (4 mg total) by mouth 3 (three) times a day    [DISCONTINUED] gabapentin (NEURONTIN) 300 mg capsule Take 1 capsule (300 mg total) by mouth 3 (three) times a day Start by taking 1 tablet at bedtime for 3 days.  Then increase to 1 tablet in the evening and 1 tablet at bedtime for 3 days.  Then increase to 1 tablet in the morning, 1 tablet in the evening, and 1 tablet at bedtime thereafter.    hydrocortisone (ANUSOL-HC) 2.5 % rectal cream Apply topically 2 (two) times a day for 14 days     No current facility-administered medications on file prior to visit.       Allergies   Allergen Reactions    Pollen Extract Other (See Comments)     Watery eyes       Physical Exam:    BP (!) 153/135   Pulse 86   Ht 5' 6\" (1.676 m)   Wt 50.2 kg (110 lb 10.7 oz)   BMI 17.86 kg/m²     Constitutional:normal, well developed, well nourished, alert, in no distress and non-toxic and no overt pain behavior.  Eyes:anicteric  HEENT:grossly intact  Neck:supple, symmetric, trachea midline and no masses   Pulmonary:even and unlabored  Cardiovascular:No edema or pitting edema present  Skin:Normal without rashes or lesions and well hydrated  Psychiatric:Mood and affect appropriate  Neurologic:Cranial Nerves II-XII grossly intact  Musculoskeletal:normal    Imaging    "

## 2024-11-13 ENCOUNTER — TELEPHONE (OUTPATIENT)
Age: 49
End: 2024-11-13

## 2024-11-13 NOTE — TELEPHONE ENCOUNTER
Leila, pt's wife, called regarding a fax that was sent over today by [facility]. Said it had to do with their gas not being shut off, to be reviewed by pcp. Please be on the lookout for this.

## 2024-11-14 NOTE — TELEPHONE ENCOUNTER
Received Fax today in [employer].    Dr Falk pt- Medical Certification; Fax from [employer]; with their gas- not being shut off.     Received 3 pgs  Pgs: 3 and 4 and 6 are missing.

## 2024-11-15 ENCOUNTER — TELEPHONE (OUTPATIENT)
Age: 49
End: 2024-11-15

## 2024-11-20 ENCOUNTER — TELEPHONE (OUTPATIENT)
Dept: GASTROENTEROLOGY | Facility: CLINIC | Age: 49
End: 2024-11-20

## 2024-11-20 NOTE — TELEPHONE ENCOUNTER
Received from [facility].... paper work requesting medical records from 10/10/2023...will fax to [facility] ( it will then also be scanned in patients chart )

## 2024-12-05 ENCOUNTER — TELEPHONE (OUTPATIENT)
Dept: SURGERY | Facility: CLINIC | Age: 49
End: 2024-12-05

## 2024-12-05 NOTE — TELEPHONE ENCOUNTER
LM to patient on why is he coming for appointment on 12/10/2024 gave office number to call back [phone].

## 2024-12-18 ENCOUNTER — VBI (OUTPATIENT)
Dept: ADMINISTRATIVE | Facility: OTHER | Age: 49
End: 2024-12-18

## 2024-12-18 NOTE — TELEPHONE ENCOUNTER
12/18/24 10:16 AM     Chart reviewed for Pap Smear (HPV) aka Cervical Cancer Screening ; nothing is submitted to the patient's insurance at this time.     Master Yo MA   [facility]

## 2024-12-26 ENCOUNTER — OFFICE VISIT (OUTPATIENT)
Age: 49
End: 2024-12-26
Payer: COMMERCIAL

## 2024-12-26 ENCOUNTER — APPOINTMENT (OUTPATIENT)
Age: 49
End: 2024-12-26
Payer: COMMERCIAL

## 2024-12-26 VITALS
OXYGEN SATURATION: 97 % | SYSTOLIC BLOOD PRESSURE: 122 MMHG | RESPIRATION RATE: 18 BRPM | HEIGHT: 66 IN | WEIGHT: 116.6 LBS | DIASTOLIC BLOOD PRESSURE: 74 MMHG | BODY MASS INDEX: 18.74 KG/M2 | HEART RATE: 78 BPM | TEMPERATURE: 97.9 F

## 2024-12-26 DIAGNOSIS — M79.675 TOE PAIN, CHRONIC, LEFT: ICD-10-CM

## 2024-12-26 DIAGNOSIS — M79.675 TOE PAIN, CHRONIC, LEFT: Primary | ICD-10-CM

## 2024-12-26 DIAGNOSIS — G89.29 TOE PAIN, CHRONIC, LEFT: ICD-10-CM

## 2024-12-26 DIAGNOSIS — G89.29 TOE PAIN, CHRONIC, LEFT: Primary | ICD-10-CM

## 2024-12-26 LAB — URATE SERPL-MCNC: 4 MG/DL (ref 3.5–8.5)

## 2024-12-26 PROCEDURE — 84550 ASSAY OF BLOOD/URIC ACID: CPT

## 2024-12-26 PROCEDURE — 36415 COLL VENOUS BLD VENIPUNCTURE: CPT

## 2024-12-26 PROCEDURE — 99213 OFFICE O/P EST LOW 20 MIN: CPT | Performed by: STUDENT IN AN ORGANIZED HEALTH CARE EDUCATION/TRAINING PROGRAM

## 2024-12-26 NOTE — PROGRESS NOTES
"Name: Erik oCrdon      : 1975      MRN: [id number]  Encounter Provider: Corbin Massey MD  Encounter Date: 2024   Encounter department: [facility]  :  Assessment & Plan  Toe pain, chronic, left  Unclear etiology. Does not look to be infected. Patient denies trauma/injury but does report that symptoms started when he cut the toenail too short. No swelling, redness, warmth. No history of gout.  Check XR of the foot to rule out fracture  Check uric acid though suspicion for gout is low  Refer to Podiatry for further evaluation    Orders:    Ambulatory Referral to Podiatry; Future    XR foot 3+ vw left; Future    Uric acid; Future           History of Present Illness     Erik Cordon is a 48 yo M with PMH of GERD and smoking who presents today for toe pain. He reports about 6 months ago he cut the nail on his L 1st big toe too short and since then he has had tenderness to touch on the top of his toenail. He describes the pain as pins and needles and only present with touch. He has been using low strength tylenol with no relief. He has been also using topical antifungal with no benefit. He denies redness or swelling. He states that when it is cold, the pain gets worse. He denies any trauma/injury to the toe. No history of gout.      Review of Systems   Constitutional:  Negative for chills and fever.   Cardiovascular:  Negative for leg swelling.   Musculoskeletal:         Left big toe pain   Skin:  Negative for color change, rash and wound.   Neurological:  Negative for numbness.       Objective   /74 (BP Location: Left arm, Patient Position: Sitting, Cuff Size: Standard)   Pulse 78   Temp 97.9 °F (36.6 °C) (Tympanic)   Resp 18   Ht 5' 6\" (1.676 m)   Wt 52.9 kg (116 lb 9.6 oz)   SpO2 97%   BMI 18.82 kg/m²      Physical Exam  Constitutional:       General: He is not in acute distress.  Eyes:      Conjunctiva/sclera: Conjunctivae normal.   Pulmonary: "      Effort: Pulmonary effort is normal.   Feet:      Comments: Tenderness to palpation of the nail of left 1st toe, no redness/swelling/wound/warmth  Skin:     General: Skin is warm and dry.   Neurological:      Mental Status: He is alert.   Psychiatric:         Mood and Affect: Mood normal.         Speech: Speech normal.         Behavior: Behavior normal. Behavior is cooperative.

## 2024-12-27 ENCOUNTER — RESULTS FOLLOW-UP (OUTPATIENT)
Age: 49
End: 2024-12-27

## 2024-12-27 ENCOUNTER — APPOINTMENT (OUTPATIENT)
Age: 49
End: 2024-12-27
Payer: COMMERCIAL

## 2024-12-27 DIAGNOSIS — G89.29 TOE PAIN, CHRONIC, LEFT: ICD-10-CM

## 2024-12-27 DIAGNOSIS — M79.675 TOE PAIN, CHRONIC, LEFT: ICD-10-CM

## 2024-12-27 PROCEDURE — 73630 X-RAY EXAM OF FOOT: CPT

## 2024-12-30 NOTE — TELEPHONE ENCOUNTER
----- Message from Corbin Massey MD sent at 12/30/2024  8:11 AM EST -----  Please let patient know that his XR showed no abnormalities. Recommend he make that appt with Podiatry so they can better evaluate what is going on under the toenail

## 2025-01-01 ENCOUNTER — TELEPHONE (OUTPATIENT)
Dept: OTHER | Facility: OTHER | Age: 50
End: 2025-01-01

## 2025-01-15 ENCOUNTER — APPOINTMENT (EMERGENCY)
Dept: MRI IMAGING | Facility: HOSPITAL | Age: 50
End: 2025-01-15
Payer: COMMERCIAL

## 2025-01-15 ENCOUNTER — HOSPITAL ENCOUNTER (EMERGENCY)
Facility: HOSPITAL | Age: 50
Discharge: HOME | End: 2025-01-15
Attending: EMERGENCY MEDICINE
Payer: COMMERCIAL

## 2025-01-15 ENCOUNTER — TELEPHONE (OUTPATIENT)
Age: 50
End: 2025-01-15

## 2025-01-15 ENCOUNTER — OFFICE VISIT (OUTPATIENT)
Dept: NEUROLOGY | Facility: CLINIC | Age: 50
End: 2025-01-15
Payer: COMMERCIAL

## 2025-01-15 VITALS
RESPIRATION RATE: 18 BRPM | HEART RATE: 70 BPM | TEMPERATURE: 98.3 F | DIASTOLIC BLOOD PRESSURE: 63 MMHG | OXYGEN SATURATION: 100 % | SYSTOLIC BLOOD PRESSURE: 139 MMHG

## 2025-01-15 VITALS
BODY MASS INDEX: 18.48 KG/M2 | OXYGEN SATURATION: 95 % | WEIGHT: 115 LBS | HEART RATE: 91 BPM | SYSTOLIC BLOOD PRESSURE: 120 MMHG | HEIGHT: 66 IN | DIASTOLIC BLOOD PRESSURE: 80 MMHG

## 2025-01-15 DIAGNOSIS — M79.604 LEG PAIN, BILATERAL: ICD-10-CM

## 2025-01-15 DIAGNOSIS — M54.16 RADICULOPATHY, LUMBAR REGION: Primary | ICD-10-CM

## 2025-01-15 DIAGNOSIS — F17.200 CURRENT EVERY DAY SMOKER: ICD-10-CM

## 2025-01-15 DIAGNOSIS — M54.50 LOW BACK PAIN: Primary | ICD-10-CM

## 2025-01-15 DIAGNOSIS — M79.605 LEG PAIN, BILATERAL: ICD-10-CM

## 2025-01-15 LAB
ANION GAP SERPL CALCULATED.3IONS-SCNC: 5 MMOL/L (ref 4–13)
BASOPHILS # BLD MANUAL: 0 THOUSAND/UL (ref 0–0.1)
BASOPHILS NFR MAR MANUAL: 0 % (ref 0–1)
BUN SERPL-MCNC: 16 MG/DL (ref 5–25)
CALCIUM SERPL-MCNC: 9 MG/DL (ref 8.4–10.2)
CHLORIDE SERPL-SCNC: 108 MMOL/L (ref 96–108)
CO2 SERPL-SCNC: 26 MMOL/L (ref 21–32)
CREAT SERPL-MCNC: 0.85 MG/DL (ref 0.6–1.3)
EOSINOPHIL # BLD MANUAL: 0.06 THOUSAND/UL (ref 0–0.4)
EOSINOPHIL NFR BLD MANUAL: 1 % (ref 0–6)
ERYTHROCYTE [DISTWIDTH] IN BLOOD BY AUTOMATED COUNT: 12.7 % (ref 11.6–15.1)
GFR SERPL CREATININE-BSD FRML MDRD: 103 ML/MIN/1.73SQ M
GLUCOSE SERPL-MCNC: 95 MG/DL (ref 65–140)
HCT VFR BLD AUTO: 37.7 % (ref 36.5–49.3)
HGB BLD-MCNC: 12.3 G/DL (ref 12–17)
LYMPHOCYTES # BLD AUTO: 1.58 THOUSAND/UL (ref 0.6–4.47)
LYMPHOCYTES # BLD AUTO: 25 % (ref 14–44)
MAGNESIUM SERPL-MCNC: 1.8 MG/DL (ref 1.9–2.7)
MCH RBC QN AUTO: 30.3 PG (ref 26.8–34.3)
MCHC RBC AUTO-ENTMCNC: 32.6 G/DL (ref 31.4–37.4)
MCV RBC AUTO: 93 FL (ref 82–98)
MONOCYTES # BLD AUTO: 0.38 THOUSAND/UL (ref 0–1.22)
MONOCYTES NFR BLD: 6 % (ref 4–12)
NEUTROPHILS # BLD MANUAL: 4.3 THOUSAND/UL (ref 1.85–7.62)
NEUTS SEG NFR BLD AUTO: 68 % (ref 43–75)
PLATELET # BLD AUTO: 174 THOUSANDS/UL (ref 149–390)
PLATELET BLD QL SMEAR: ADEQUATE
PMV BLD AUTO: 10.3 FL (ref 8.9–12.7)
POTASSIUM SERPL-SCNC: 4 MMOL/L (ref 3.5–5.3)
RBC # BLD AUTO: 4.06 MILLION/UL (ref 3.88–5.62)
SODIUM SERPL-SCNC: 139 MMOL/L (ref 135–147)
WBC # BLD AUTO: 6.33 THOUSAND/UL (ref 4.31–10.16)

## 2025-01-15 PROCEDURE — 85007 BL SMEAR W/DIFF WBC COUNT: CPT

## 2025-01-15 PROCEDURE — 85027 COMPLETE CBC AUTOMATED: CPT

## 2025-01-15 PROCEDURE — 99284 EMERGENCY DEPT VISIT MOD MDM: CPT

## 2025-01-15 PROCEDURE — 99204 OFFICE O/P NEW MOD 45 MIN: CPT | Performed by: PSYCHIATRY & NEUROLOGY

## 2025-01-15 PROCEDURE — 83735 ASSAY OF MAGNESIUM: CPT

## 2025-01-15 PROCEDURE — 72148 MRI LUMBAR SPINE W/O DYE: CPT

## 2025-01-15 PROCEDURE — 96374 THER/PROPH/DIAG INJ IV PUSH: CPT

## 2025-01-15 PROCEDURE — 36415 COLL VENOUS BLD VENIPUNCTURE: CPT

## 2025-01-15 PROCEDURE — 80048 BASIC METABOLIC PNL TOTAL CA: CPT

## 2025-01-15 RX ORDER — LIDOCAINE 50 MG/G
1 PATCH TOPICAL DAILY
Qty: 30 PATCH | Refills: 0 | Status: SHIPPED | OUTPATIENT
Start: 2025-01-15 | End: 2025-01-15 | Stop reason: CLARIF

## 2025-01-15 RX ORDER — IBUPROFEN 600 MG/1
600 TABLET, FILM COATED ORAL EVERY 6 HOURS PRN
Qty: 30 TABLET | Refills: 0 | Status: SHIPPED | OUTPATIENT
Start: 2025-01-15

## 2025-01-15 RX ORDER — OXYCODONE AND ACETAMINOPHEN 5; 325 MG/1; MG/1
1 TABLET ORAL ONCE
Refills: 0 | Status: COMPLETED | OUTPATIENT
Start: 2025-01-15 | End: 2025-01-15

## 2025-01-15 RX ORDER — HYDROMORPHONE HCL/PF 1 MG/ML
0.5 SYRINGE (ML) INJECTION ONCE
Status: COMPLETED | OUTPATIENT
Start: 2025-01-15 | End: 2025-01-15

## 2025-01-15 RX ORDER — IBUPROFEN 600 MG/1
600 TABLET, FILM COATED ORAL EVERY 6 HOURS PRN
Qty: 30 TABLET | Refills: 0 | Status: SHIPPED | OUTPATIENT
Start: 2025-01-15 | End: 2025-01-15

## 2025-01-15 RX ORDER — OXYCODONE AND ACETAMINOPHEN 5; 325 MG/1; MG/1
1 TABLET ORAL EVERY 4 HOURS PRN
Qty: 10 TABLET | Refills: 0 | Status: SHIPPED | OUTPATIENT
Start: 2025-01-15 | End: 2025-01-25

## 2025-01-15 RX ORDER — OXYCODONE AND ACETAMINOPHEN 5; 325 MG/1; MG/1
1 TABLET ORAL EVERY 4 HOURS PRN
Qty: 10 TABLET | Refills: 0 | Status: SHIPPED | OUTPATIENT
Start: 2025-01-15 | End: 2025-01-15

## 2025-01-15 RX ADMIN — OXYCODONE HYDROCHLORIDE AND ACETAMINOPHEN 1 TABLET: 5; 325 TABLET ORAL at 12:51

## 2025-01-15 RX ADMIN — HYDROMORPHONE HYDROCHLORIDE 0.5 MG: 1 INJECTION, SOLUTION INTRAMUSCULAR; INTRAVENOUS; SUBCUTANEOUS at 16:09

## 2025-01-15 NOTE — TELEPHONE ENCOUNTER
Patients significant other called to let Dr. Falk know that patient is currently at the ER. He just had an MRI done. She says they sent him to go to the ER right away from his neurology appointment.       She did make an appointment tomorrow bc they want to touchbase with her and go over some paperwork.     Thank you.    Erik: [phone]

## 2025-01-15 NOTE — Clinical Note
[facility] accompanied Erik Cordon to the emergency department on 1/15/2025.    Return date if applicable: 01/16/2025        If you have any questions or concerns, please don't hesitate to call.      Caterina Reid PA-C

## 2025-01-15 NOTE — ED PROVIDER NOTES
Time reflects when diagnosis was documented in both MDM as applicable and the Disposition within this note       Time User Action Codes Description Comment    1/15/2025  5:18 PM Caterina Reid Add [M54.50] Low back pain     1/15/2025  5:18 PM Caterina Reid Add [M79.604,  M79.605] Leg pain, bilateral           ED Disposition       ED Disposition   Discharge    Condition   Stable    Date/Time   Wed Jimmy 15, 2025  5:18 PM    Comment   Erik Cordon discharge to home/self care.                   Assessment & Plan       Medical Decision Making  Patient's symptoms appear to be chronic without any acute change recently.  Neurologist requesting MRI lumbar spine without contrast.  I ordered CBC, CMP, and magnesium which were within normal limits.  MRI lumbar spine without contrast ordered.  Patient given Percocet for pain management.  Patient denies any chronic pain medication through pain management and states the only medication he was given through them was gabapentin which she stopped due to drowsiness.    On reeval patient still having pain.  Ordered half milligram Dilaudid.  MRI did not show any acute abnormality.  Patient was able to ambulate around the ED without difficulty. Recommended rest, avoidance of back strain, light activity including walking, topical analgesics including lidocaine patches, applying heat, applying ice, and Motrin. Prescribed Percocet. Advised to f/u with neurology and orthopedic surgery. Case discussed with Dr. Montiel. Advised patient to return with any new or worsening symptoms including but not limited to worsening back pain, difficulty walking, fevers, numbness, tingling, incontinence, or any other concerning symptoms. Discussed assessment and plan with patient who verbalizes understanding and agrees with plan.    Amount and/or Complexity of Data Reviewed  Labs: ordered. Decision-making details documented in ED Course.  Radiology: ordered. Decision-making details documented in  ED Course.    Risk  Prescription drug management.        ED Course as of 01/15/25 2018   Wed Jimmy 15, 2025   1154 Blood Pressure: 160/72   1154 Temperature: 98.3 °F (36.8 °C)   1154 Pulse: 77   1154 Respirations: 19   1154 SpO2: 100 %   1357 MAGNESIUM(!): 1.8   1357 CBC and differential  Normal   1358 Basic metabolic panel  Normal   1358 Manual Differential(PHLEBS Do Not Order)  Normal   1554 MRI lumbar spine wo contrast  IMPRESSION:  1.  No substantial interval change compared to prior from 4/22/2024.  2.  Mild multilevel degenerative changes as discussed superimposed on congenital spinal stenosis. No high-grade acquired canal narrowing. Stable multilevel subarticular and foraminal narrowing as outlined above.   1554 Patient complaining of continued pain. Ordered dilaudid and will road test him.       Medications   oxyCODONE-acetaminophen (PERCOCET) 5-325 mg per tablet 1 tablet (1 tablet Oral Given 1/15/25 1251)   HYDROmorphone (DILAUDID) injection 0.5 mg (0.5 mg Intravenous Given 1/15/25 1609)       ED Risk Strat Scores                          SBIRT 20yo+      Flowsheet Row Most Recent Value   Initial Alcohol Screen: US AUDIT-C     1. How often do you have a drink containing alcohol? 2 Filed at: 01/15/2025 1211   2. How many drinks containing alcohol do you have on a typical day you are drinking?  0 Filed at: 01/15/2025 1211   3a. Male UNDER 65: How often do you have five or more drinks on one occasion? 0 Filed at: 01/15/2025 1211   3b. FEMALE Any Age, or MALE 65+: How often do you have 4 or more drinks on one occassion? 0 Filed at: 01/15/2025 1211   Audit-C Score 2 Filed at: 01/15/2025 1211   NAYANA: How many times in the past year have you...    Used an illegal drug or used a prescription medication for non-medical reasons? Daily or Almost Daily   [Marijuana stated has a card for it] Filed at: 01/15/2025 1211   DAST-10: In the past 12 months...    1. Have you used drugs other than those required for medical  "reasons? 0 Filed at: 01/15/2025 1211   2. Do you use more than one drug at a time? 0 Filed at: 01/15/2025 1211   3. Have you had medical problems as a result of your drug use (e.g., memory loss, hepatitis, convulsions, bleeding, etc.)? 0 Filed at: 01/15/2025 1211   4. Have you had \"blackouts\" or \"flashbacks\" as a result of drug use?YesNo 0 Filed at: 01/15/2025 1211   5. Do you ever feel bad or guilty about your drug use? 0 Filed at: 01/15/2025 1211   6. Does your spouse (or parent) ever complain about your involvement with drugs? 0 Filed at: 01/15/2025 1211   7. Have you neglected your family because of your use of drugs? 0 Filed at: 01/15/2025 1211   8. Have you engaged in illegal activities in order to obtain drugs? 0 Filed at: 01/15/2025 1211   9. Have you ever experienced withdrawal symptoms (felt sick) when you stopped taking drugs? 0 Filed at: 01/15/2025 1211   10. Are you always able to stop using drugs when you want to? 0 Filed at: 01/15/2025 1211   DAST-10 Score 0 Filed at: 01/15/2025 1211                            History of Present Illness       Chief Complaint   Patient presents with    Pain     Pt sent from Neurology office for excruciating pain in lower back and left leg. Reports cold legs and feet at time. Currently being seen at pain management with no relief. Neuro suggested MRI.        Past Medical History:   Diagnosis Date    Anxiety 2019    Chronic pain disorder     to back down to knees    GERD (gastroesophageal reflux disease)     Shortness of breath     with ambulation      Past Surgical History:   Procedure Laterality Date    LUMBAR EPIDURAL INJECTION      pain shot      Family History   Problem Relation Age of Onset    No Known Problems Mother     No Known Problems Father     No Known Problems Sister     No Known Problems Brother     No Known Problems Maternal Grandmother     No Known Problems Maternal Grandfather     No Known Problems Paternal Grandmother     No Known Problems Paternal " Grandfather     No Known Problems Maternal Aunt     No Known Problems Maternal Uncle     No Known Problems Paternal Aunt     No Known Problems Paternal Uncle     No Known Problems Cousin       Social History     Tobacco Use    Smoking status: Some Days     Current packs/day: 0.50     Average packs/day: 0.7 packs/day for 33.7 years (22.5 ttl pk-yrs)     Types: Cigarettes     Start date: 1/1/1997    Smokeless tobacco: Never   Vaping Use    Vaping status: Never Used   Substance Use Topics    Alcohol use: Not Currently     Comment: holidays    Drug use: Yes     Frequency: 7.0 times per week     Types: Marijuana     Comment: 4 times a day      E-Cigarette/Vaping    E-Cigarette Use Never User       E-Cigarette/Vaping Substances    Nicotine No     THC No     CBD No     Flavoring No     Other No     Unknown No       I have reviewed and agree with the history as documented.     Patient is a 49-year-old male with a hx of GERD, Anxiety, chronic pain, and lumbar spine foraminal narrowing, who presents accompanied by his wife complaining of low back and left leg pain onset over 2 years ago. Patient states he was helping a friend and got hit on the left leg with his friends wheel chair before symptoms started 2 years ago. Patient has seen pain management and tried gabapentin, epidural injections, and PT w/o resolution. Patient reports felling cold in his legs. Patient reports he can walk for about 5 minutes before he starts having difficulty.  Patient saw neurology today who referred him to the ED for MRI due to pain out of proportion and reported numbness and weakness. Patient denies any history of trauma, recent illnesses, fever, history of cancer, IV drug use, bowel and bladder incontinence, bowel or bladder retention, or any other symptoms at this time.      History provided by:  Patient   used: No        Review of Systems   Constitutional: Negative.  Negative for chills and fever.   HENT: Negative.      Eyes: Negative.    Respiratory: Negative.  Negative for shortness of breath.    Cardiovascular: Negative.  Negative for chest pain.   Gastrointestinal: Negative.  Negative for abdominal pain, constipation, diarrhea, nausea and vomiting.   Genitourinary: Negative.  Negative for difficulty urinating, dysuria and hematuria.   Musculoskeletal:  Positive for back pain and gait problem. Negative for neck pain.   Skin: Negative.  Negative for color change and rash.   Neurological:  Positive for weakness and numbness. Negative for seizures and syncope.   All other systems reviewed and are negative.          Objective       ED Triage Vitals   Temperature Pulse Blood Pressure Respirations SpO2 Patient Position - Orthostatic VS   01/15/25 1148 01/15/25 1148 01/15/25 1152 01/15/25 1148 01/15/25 1148 01/15/25 1148   98.3 °F (36.8 °C) 77 160/72 19 100 % Sitting      Temp Source Heart Rate Source BP Location FiO2 (%) Pain Score    01/15/25 1148 01/15/25 1148 01/15/25 1148 -- 01/15/25 1200    Temporal Monitor Left arm  8      Vitals      Date and Time Temp Pulse SpO2 Resp BP Pain Score FACES Pain Rating User   01/15/25 1637 -- -- -- -- -- 10 - Worst Possible Pain -- MB   01/15/25 1636 -- 70 100 % 18 139/63 -- -- MB   01/15/25 1609 -- -- -- -- -- 10 - Worst Possible Pain -- KM   01/15/25 1600 -- 59 100 % -- 108/52 -- -- KM   01/15/25 1521 -- 65 100 % 17 120/62 7 -- MB   01/15/25 1430 -- 72 99 % 18 99/62 7 -- MB   01/15/25 1345 -- 65 99 % 18 153/91 -- -- SB   01/15/25 1332 -- 81 100 % 18 153/91 8 -- MB   01/15/25 1329 -- -- -- -- -- 8 -- MB   01/15/25 1251 -- -- -- -- -- 8 -- MB   01/15/25 1230 -- 71 100 % 20 143/96 8 -- MB   01/15/25 1215 -- -- 100 % -- -- 8 -- MB   01/15/25 1200 -- 84 100 % 18 171/75 8 -- MB   01/15/25 1152 -- -- -- -- 160/72 -- -- GP   01/15/25 1148 98.3 °F (36.8 °C) 77 100 % 19 -- -- -- GP            Physical Exam  Vitals and nursing note reviewed.   Constitutional:       General: He is awake. He is not in  acute distress.     Appearance: Normal appearance. He is well-developed and well-groomed. He is not ill-appearing, toxic-appearing or diaphoretic.      Comments: Patient is resting on bed with several warm blankets over his legs.  In no acute distress.  Cooperative.   HENT:      Head: Normocephalic and atraumatic.      Right Ear: External ear normal.      Left Ear: External ear normal.      Nose: Nose normal.      Mouth/Throat:      Lips: Pink.   Eyes:      General: Lids are normal.      Extraocular Movements: Extraocular movements intact.      Conjunctiva/sclera: Conjunctivae normal.   Cardiovascular:      Rate and Rhythm: Normal rate.      Heart sounds: Normal heart sounds. No murmur heard.  Pulmonary:      Effort: Pulmonary effort is normal. No tachypnea or respiratory distress.      Breath sounds: Normal breath sounds and air entry. No stridor. No decreased breath sounds, wheezing, rhonchi or rales.   Musculoskeletal:         General: No swelling.      Cervical back: Normal, normal range of motion and neck supple. No tenderness or bony tenderness. No spinous process tenderness or muscular tenderness.      Thoracic back: Normal. No tenderness or bony tenderness.      Lumbar back: Tenderness and bony tenderness present. Positive right straight leg raise test and positive left straight leg raise test.      Right lower leg: No edema.      Left lower leg: No edema.      Comments: Strength in bilateral lower extremities is 3 out of 5.  Sensation intact in lower extremities but patient reports decreased sensation on the left.   Skin:     General: Skin is warm and dry.   Neurological:      Mental Status: He is alert and oriented to person, place, and time.      GCS: GCS eye subscore is 4. GCS verbal subscore is 5. GCS motor subscore is 6.      Sensory: Sensation is intact.      Motor: Weakness present.   Psychiatric:         Attention and Perception: Attention normal.         Mood and Affect: Mood normal.          Speech: Speech is tangential.         Behavior: Behavior is cooperative.         Results Reviewed       Procedure Component Value Units Date/Time    CBC and differential [[id number]]  (Normal) Collected: 01/15/25 1304    Lab Status: Final result Specimen: Blood from Arm, Left Updated: 01/15/25 1337     WBC 6.33 Thousand/uL      RBC 4.06 Million/uL      Hemoglobin 12.3 g/dL      Hematocrit 37.7 %      MCV 93 fL      MCH 30.3 pg      MCHC 32.6 g/dL      RDW 12.7 %      MPV 10.3 fL      Platelets 174 Thousands/uL     Narrative:      This is an appended report.  These results have been appended to a previously verified report.    Manual Differential(PHLEBS Do Not Order) [[id number]] Collected: 01/15/25 1304    Lab Status: Final result Specimen: Blood from Arm, Left Updated: 01/15/25 1337     Segmented % 68 %      Lymphocytes % 25 %      Monocytes % 6 %      Eosinophils % 1 %      Basophils % 0 %      Absolute Neutrophils 4.30 Thousand/uL      Absolute Lymphocytes 1.58 Thousand/uL      Absolute Monocytes 0.38 Thousand/uL      Absolute Eosinophils 0.06 Thousand/uL      Absolute Basophils 0.00 Thousand/uL      Total Counted --     Platelet Estimate Adequate    Magnesium [[id number]]  (Abnormal) Collected: 01/15/25 1304    Lab Status: Final result Specimen: Blood from Arm, Left Updated: 01/15/25 1328     Magnesium 1.8 mg/dL     Basic metabolic panel [[id number]] Collected: 01/15/25 1304    Lab Status: Final result Specimen: Blood from Arm, Left Updated: 01/15/25 1327     Sodium 139 mmol/L      Potassium 4.0 mmol/L      Chloride 108 mmol/L      CO2 26 mmol/L      ANION GAP 5 mmol/L      BUN 16 mg/dL      Creatinine 0.85 mg/dL      Glucose 95 mg/dL      Calcium 9.0 mg/dL      eGFR 103 ml/min/1.73sq m     Narrative:      [employer] guidelines for Chronic Kidney Disease (CKD):     Stage 1 with normal or high GFR (GFR > 90 mL/min/1.73 square meters)    Stage 2 Mild CKD (GFR = 60-89 mL/min/1.73 square  meters)    Stage 3A Moderate CKD (GFR = 45-59 mL/min/1.73 square meters)    Stage 3B Moderate CKD (GFR = 30-44 mL/min/1.73 square meters)    Stage 4 Severe CKD (GFR = 15-29 mL/min/1.73 square meters)    Stage 5 End Stage CKD (GFR <15 mL/min/1.73 square meters)  Note: GFR calculation is accurate only with a steady state creatinine            MRI lumbar spine wo contrast   Final Interpretation by Mars Huynh MD (01/15 4146)      1.  No substantial interval change compared to prior from 4/22/2024.   2.  Mild multilevel degenerative changes as discussed superimposed on congenital spinal stenosis. No high-grade acquired canal narrowing. Stable multilevel subarticular and foraminal narrowing as outlined above.         Workstation performed: [id number]             Procedures    ED Medication and Procedure Management   None     Discharge Medication List as of 1/15/2025  5:25 PM        START taking these medications    Details   ibuprofen (MOTRIN) 600 mg tablet Take 1 tablet (600 mg total) by mouth every 6 (six) hours as needed for mild pain, Starting Wed 1/15/2025, Normal      oxyCODONE-acetaminophen (Percocet) 5-325 mg per tablet Take 1 tablet by mouth every 4 (four) hours as needed for moderate pain for up to 10 days Max Daily Amount: 6 tablets, Starting Wed 1/15/2025, Until Sat 1/25/2025 at 2359, Normal           No discharge procedures on file.  ED SEPSIS DOCUMENTATION   Time reflects when diagnosis was documented in both MDM as applicable and the Disposition within this note       Time User Action Codes Description Comment    1/15/2025  5:18 PM Caterina Reid [M54.50] Low back pain     1/15/2025  5:18 PM Caterina Ried Add [M79.604,  M79.605] Leg pain, bilateral                  Caterina Reid PA-C  01/15/25 2018

## 2025-01-15 NOTE — PROGRESS NOTES
Neurology Ambulatory Visit  Name: Erik Cordon       : 1975       MRN: [id number]   Encounter Provider: Sara Goldberg MD   Encounter Date: 1/15/2025  Encounter department: [facility]      Erik Cordon is a 49 y.o. male.       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  Family is agreeable the wife is going to drive him I spoke in the ER to Frandy in the emergency room department he is going to let the ER physician know and also advised him that patient should be kept warm in his legs since he is very sensitive to cold.  He will follow-up with his other physicians and me after his discharge, he was strongly encouraged to quit smoking and follow-up with his other physician.  Current every day smoker           Patient advised to quit smoking  Subjective:  Chief Complaint   Patient presents with    Nerve Pain       HISTORY OF PRESENT ILLNESS  49-year-old male accompanied  with his wife for evaluation of low back and left leg pain worse than the right leg pain according to the patient and the wife his symptoms have been going on for almost 2 years it has been getting progressively worse even at rest he has pain radiating from his lower back or from the left hip into the left leg and he feels cold in his leg according to the wife sometimes she feels that his feet get ice cold and on walking his pain gets radiates into the right more than the left but he cannot walk for more than a few feet and then he has to stop he has seen a pain management and had an epidural injection without much relief he was initially on gabapentin which was stopped due to making him drowsy has tried physical therapy with very little relief and tells me that he cannot tolerate physical therapy he was referred to see an orthopedic spine surgeon and has an appointment in February but currently patient is in tears in my office secondary to excruciating pain denies any history of trauma no recent illnesses he says that he is just not able to do anything no bowel and bladder incontinence, his physical examination was limited secondary to pain          Prior Work-up:   MRI: Lumbar spine from 4/22/2024 was reported as mild diffuse developmental spinal canal narrowing with superimposed degenerative changes resulting in moderate bilateral foraminal narrowing at L3-L4, mild bilateral foraminal narrowing at L2-L3 and L4-L5       Past Medical History:    Past Medical History:   Diagnosis Date    Anxiety 2019    Chronic pain disorder     to back down to knees    GERD (gastroesophageal reflux disease)     Shortness of breath     with ambulation     Past Surgical History:   Procedure Laterality Date    LUMBAR EPIDURAL INJECTION      pain shot       Family History:  Family History   Problem Relation Age of Onset    No Known Problems Mother     No Known Problems Father     No Known Problems Sister     No Known Problems Brother   "   No Known Problems Maternal Grandmother     No Known Problems Maternal Grandfather     No Known Problems Paternal Grandmother     No Known Problems Paternal Grandfather     No Known Problems Maternal Aunt     No Known Problems Maternal Uncle     No Known Problems Paternal Aunt     No Known Problems Paternal Uncle     No Known Problems Cousin        Social History:  Social History     Tobacco Use    Smoking status: Some Days     Current packs/day: 0.50     Average packs/day: 0.7 packs/day for 33.7 years (22.5 ttl pk-yrs)     Types: Cigarettes     Start date: 1/1/1997    Smokeless tobacco: Never   Vaping Use    Vaping status: Never Used   Substance Use Topics    Alcohol use: Not Currently     Comment: holidays    Drug use: Yes     Frequency: 7.0 times per week     Types: Marijuana     Comment: 4 times a day      Living situation:    Work:      Allergies:  Allergies   Allergen Reactions    Pollen Extract Other (See Comments)     Watery eyes       Medications:  No current outpatient medications on file.    Objective:  /80 (BP Location: Left arm, Patient Position: Sitting, Cuff Size: Standard)   Pulse 91   Ht 5' 6\" (1.676 m)   Wt 52.2 kg (115 lb)   SpO2 95%   BMI 18.56 kg/m²      Labs  I have reviewed pertinent labs:  CBC:   Lab Results   Component Value Date    WBC 7.53 02/22/2024    RBC 4.38 02/22/2024    HGB 13.5 02/22/2024    HCT 42.2 02/22/2024    MCV 96 02/22/2024     02/22/2024    MCH 30.8 02/22/2024    MCHC 32.0 02/22/2024    RDW 12.7 02/22/2024    MPV 11.3 02/22/2024    NEUTROABS 4.76 02/22/2024     CMP:   Lab Results   Component Value Date    SODIUM 138 02/22/2024    K 4.4 02/22/2024     02/22/2024    CO2 26 02/22/2024    AGAP 5 02/22/2024    BUN 20 02/22/2024    CREATININE 1.01 02/22/2024    GLUF 89 02/22/2024    CALCIUM 8.7 02/22/2024    AST 16 02/22/2024    ALT 13 02/22/2024    ALKPHOS 74 02/22/2024    TP 7.3 02/22/2024    ALB 3.9 02/22/2024    TBILI 0.41 02/22/2024    EGFR 88 " "02/22/2024     Thyroid Studies:   Lab Results   Component Value Date    RHB4LBROPQFP 0.972 02/22/2024     Vitamin D Level   Lab Results   Component Value Date    XZNI31WEMRLL 17.1 (L) 02/22/2021     Vitamin B12 No results found for: \"NPUILKQR41\"  Folate No results found for: \"FOLATE\"           General Exam  GENERAL APPEARANCE: Patient in severe distress, secondary to pain and crying alert, interactive and cooperative.  CARDIOVASCULAR: Warm and well perfused  LUNGS: normal work of breathing on room air  EXTREMITIES: no peripheral edema     Neurologic Exam  MENTAL STATE:  Orientation was normal to time, place and person without aphasia or apraxia. Recent and remote memory was intact.  Attention span and concentration were normal. Language testing was normal for comprehension, repetition, expression, and naming.     CRANIAL NERVES:  CN 2       visual fields full to confrontation.  CN 3, 4, 6  Pupils round, 4 mm in diameter, equally reactive to light. Lids symmetric; no ptosis. EOMs normal alignment, full range. No nystagmus.  CN 5  Facial sensation intact bilaterally.  CN 7  Normal and symmetric facial strength.   CN 8  Hearing intact to finger rub bilaterally.  CN 9  Palate elevates symmetrically.  CN 11  Normal strength of shoulder shrug and neck turning.  CN 12  Tongue midline, with normal bulk and strength.     MOTOR:  Strength in upper extremities is 5/5 proximally distally he was giving me poor effort in lower extremity secondary to his left leg pain   REFLEXES:  RIGHT UE   LEFT UE  BR:2              BR:2    Biceps:2      Biceps:2    Triceps:2     Triceps:2       RIGHT LLE   LEFT LLE    Knee:2           Knee:2    Ankle:2         Ankle:2    Babinski: toes downgoing to plantar stimulation to withdrawal patient is very sensitive and hence very limited exam. No clonus.     SENSORY:  Could not do a sensory exam as patient is sensitive to touch  COORDINATION:   Coordination exam was normal. In both upper extremities, " finger-nose-finger was intact without dysmetria or overshoot.      GAIT:  Antalgic gait pattern secondary to pain  Paraspinal muscle tenderness in the lumbar area    ROS:  Review of Systems   Constitutional:  Negative for appetite change, fatigue and fever.   HENT: Negative.  Negative for hearing loss, tinnitus, trouble swallowing and voice change.    Eyes: Negative.  Negative for photophobia, pain and visual disturbance.   Respiratory: Negative.  Negative for shortness of breath.    Cardiovascular: Negative.  Negative for palpitations.   Gastrointestinal: Negative.  Negative for nausea and vomiting.   Endocrine: Negative.  Negative for cold intolerance.   Genitourinary: Negative.  Negative for dysuria, frequency and urgency.   Musculoskeletal:  Positive for back pain. Negative for myalgias, neck pain and neck stiffness.   Skin: Negative.  Negative for rash.   Allergic/Immunologic: Negative.    Neurological:  Negative for dizziness, tremors, seizures, syncope, facial asymmetry, speech difficulty, weakness, light-headedness, numbness and headaches.   Hematological: Negative.  Does not bruise/bleed easily.   Psychiatric/Behavioral: Negative.  Negative for confusion, hallucinations and sleep disturbance.        I have reviewed the past medical history, surgical history, social and family history, current medications, allergies vitals, review of systems, and updated this information as appropriate today.    Administrative Statements   The total amount of time spent with the patient and on chart review and documentation was  45 minutes. Issues addressed during this clinic visit included overall management, medication counseling or monitoring, and counseling and coordination of care.         Sara Goldberg MD

## 2025-01-15 NOTE — ASSESSMENT & PLAN NOTE
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  Family is agreeable the wife is going to drive him I spoke in the ER to Frandy in the emergency room department he is going to let the ER physician know and also advised him that patient should be kept warm in his legs since he is very sensitive to cold.  He will follow-up with his other physicians and me after his discharge, he was strongly encouraged to quit smoking and follow-up with his other physician.

## 2025-01-15 NOTE — DISCHARGE INSTRUCTIONS
You were evaluated in the Emergency Department today for your back pain. Your evaluation did not show signs of medical conditions requiring emergent intervention at this time. Recommend rest and avoidance of back strain. Recommend walking and light exercise to prevent further back spasm.  We recommend you take 600mg ibuprofen every 6 hours or tylenol 650mg every 6 hours as needed for pain. If needed, you can alternate these medications so that you take one medication every 3 hours. For instance, at noon take ibuprofen, then at 3pm take tylenol, then at 6pm take ibuprofen.  Please take your prescribed percocet as directed as necessary for severe pain. Do not drive while taking percocet. Do not takje tylenol with percocet as it contains tylenol in it already!  Please schedule an appointment for follow-up with your primary care physician this week for further evaluation of your symptoms.  Return to the Emergency Department if you experience worsening back pain, difficulty walking, fevers, numbness, tingling, incontinence, or any other concerning symptoms.

## 2025-01-16 ENCOUNTER — OFFICE VISIT (OUTPATIENT)
Age: 50
End: 2025-01-16
Payer: COMMERCIAL

## 2025-01-16 VITALS
SYSTOLIC BLOOD PRESSURE: 144 MMHG | RESPIRATION RATE: 16 BRPM | HEIGHT: 66 IN | HEART RATE: 71 BPM | DIASTOLIC BLOOD PRESSURE: 74 MMHG | OXYGEN SATURATION: 100 % | WEIGHT: 118.4 LBS | TEMPERATURE: 98.5 F | BODY MASS INDEX: 19.03 KG/M2

## 2025-01-16 DIAGNOSIS — L60.0 INGROWN TOENAIL: ICD-10-CM

## 2025-01-16 DIAGNOSIS — M54.16 RADICULOPATHY, LUMBAR REGION: Primary | ICD-10-CM

## 2025-01-16 PROBLEM — Z00.00 PREVENTATIVE HEALTH CARE: Status: RESOLVED | Noted: 2024-02-21 | Resolved: 2025-01-16

## 2025-01-16 PROCEDURE — 99214 OFFICE O/P EST MOD 30 MIN: CPT | Performed by: FAMILY MEDICINE

## 2025-01-16 NOTE — PROGRESS NOTES
"Name: Erik Cordon      : 1975      MRN: [id number]  Encounter Provider: Erendira Falk DO  Encounter Date: 2025   Encounter department: [facility] [place]  :  Assessment & Plan  Radiculopathy, lumbar region  Longstanding issue.  Recent evaluation by neurology.  Recent MRI shows some mild degenerative changes of the lumbar spine.  Discussed the need for physical therapy.  Patient will be going back to neurosurgeon soon       Ingrown toenail    Orders:    Ambulatory Referral to Podiatry; Future           History of Present Illness     Patient presents for ER follow-up.  Seen for acute on chronic back pain. MRI obtained and reviewed with patient and spouse      Review of Systems   Musculoskeletal:  Positive for back pain and gait problem (Due to pain from ingrown toenail).       Objective   /74 (BP Location: Left arm, Patient Position: Sitting, Cuff Size: Standard)   Pulse 71   Temp 98.5 °F (36.9 °C) (Tympanic)   Resp 16   Ht 5' 6\" (1.676 m)   Wt 53.7 kg (118 lb 6.4 oz)   SpO2 100%   BMI 19.11 kg/m²      Physical Exam  HENT:      Head: Normocephalic.   Cardiovascular:      Rate and Rhythm: Normal rate.   Pulmonary:      Effort: Pulmonary effort is normal.   Feet:      Right foot:      Toenail Condition: Right toenails are ingrown.      Comments: Right great toe  Neurological:      Mental Status: He is alert.         " Patient Name:  Hiram Page   MRN:  [id number]  Age:  43 year old    YOB: 1977      POSTPARTUM PROGRESS NOTE    Pt is PPD#1 s/p vaginal delivery.  She is doing well without complaints.  Pt is ambulating, voiding, tolerating a regular diet.  Pain is well controlled and lochia is within normal limits.  She is breastfeeding.  Baby is doing well.    Objective:    Temp:  [97.8  F (36.6  C)-98  F (36.7  C)] 97.8  F (36.6  C)  Pulse:  [70] 70  Resp:  [16] 16  BP: (103-105)/(35-40) 103/40  0 lbs 0 oz    General Appearance:  NAD  Lungs:  unlabored  Cardiovascular:  RRR  Abdomen:  nontender, nondistended  Fundus:  firm, below the umbilicus      Lower extremities:  trace symmetric edema    Lab Review:    ABO/RH(D)   Date Value Ref Range Status   09/10/2021 O POS  Final     Hemoglobin   Date Value Ref Range Status   09/10/2021 11.6 (L) 11.7 - 15.7 g/dL Final   2018 11.6 (L) 11.7 - 15.7 g/dL Final   2016 11.2 (L) 11.7 - 15.7 g/dL Final     Hematocrit   Date Value Ref Range Status   2016 34.9 (L) 35.0 - 47.0 % Final       Lab Results   Component Value Date    WBC 7.1 2016     Lab Results   Component Value Date    RBC 3.78 2016     Lab Results   Component Value Date    HGB 11.6 09/10/2021    HGB 11.6 2018     Lab Results   Component Value Date    HCT 34.9 2016     No components found for: MCT  Lab Results   Component Value Date    MCV 92 2016     Lab Results   Component Value Date    MCH 29.6 2016     Lab Results   Component Value Date    MCHC 32.1 2016     Lab Results   Component Value Date    RDW 14.3 2016     Lab Results   Component Value Date    PLT 80 2016       Assessment: 44yo  PPD#2 s/p vaginal delivery ( #4), doing well.    Plan:   - Postpartum: recovering well. Pain well controlled. Cont PO pain meds and regular diet. Encourage ambulation.  - Thrombocytopenia:  VB minimal.  Will repeat CBC at PP visit  - Contraception:  POPs; declines starting this now  - Dispo: home      Dr. Aiden Lynch  [phone]

## 2025-01-16 NOTE — ED ATTENDING ATTESTATION
1/15/2025  IJean Paul MD, saw and evaluated the patient. I have discussed the patient with the resident/non-physician practitioner and agree with the resident's/non-physician practitioner's findings, Plan of Care, and MDM as documented in the resident's/non-physician practitioner's note, except where noted. All available labs and Radiology studies were reviewed.  I was present for key portions of any procedure(s) performed by the resident/non-physician practitioner and I was immediately available to provide assistance.       At this point I agree with the current assessment done in the Emergency Department.  I have conducted an independent evaluation of this patient a history and physical is as follows:  Patient is a 49-year-old male with chronic back pain apparently saw his neurologist today and due to the number of complaints his neurologist did not feel comfortable evaluating the patient and send the patient to the emergency department for an MRI.  Patient complains of left-sided low back pain that radiates down his leg primarily the left leg.  Reports the pain radiates all the way down to his toes.  He denies any portia weakness or numbness.  He denies any direct trauma to his back.  Denies any bowel or bladder involvement.  Physical exam shows an awake and alert 49-year-old male in no acute distress, oriented x 3, neck is supple, lungs are clear heart regular rate and rhythm, he has low back tenderness with palpation, he has a positive left-sided straight leg raise,.      ED Course     Due to the fact that the patient was referred to the emergency department by neurology for an MRI and MRI was done.  MRI showed no acute pathology.  The patient was able to ambulate here after analgesics.  Patient will be treated as an outpatient and will follow-up as an outpatient.    Critical Care Time  Procedures

## 2025-01-16 NOTE — ASSESSMENT & PLAN NOTE
Longstanding issue.  Recent evaluation by neurology.  Recent MRI shows some mild degenerative changes of the lumbar spine.  Discussed the need for physical therapy.  Patient will be going back to neurosurgeon soon

## 2025-02-03 ENCOUNTER — HOSPITAL ENCOUNTER (OUTPATIENT)
Dept: NON INVASIVE DIAGNOSTICS | Facility: CLINIC | Age: 50
Discharge: HOME | End: 2025-02-03
Payer: COMMERCIAL

## 2025-02-03 DIAGNOSIS — M54.16 RADICULOPATHY, LUMBAR REGION: ICD-10-CM

## 2025-02-03 PROCEDURE — 93970 EXTREMITY STUDY: CPT

## 2025-02-03 PROCEDURE — 93970 EXTREMITY STUDY: CPT | Performed by: SURGERY

## 2025-02-05 ENCOUNTER — OFFICE VISIT (OUTPATIENT)
Age: 50
End: 2025-02-05
Payer: COMMERCIAL

## 2025-02-05 VITALS
WEIGHT: 115 LBS | HEIGHT: 66 IN | OXYGEN SATURATION: 99 % | HEART RATE: 65 BPM | TEMPERATURE: 97.7 F | BODY MASS INDEX: 18.48 KG/M2 | DIASTOLIC BLOOD PRESSURE: 74 MMHG | RESPIRATION RATE: 16 BRPM | SYSTOLIC BLOOD PRESSURE: 132 MMHG

## 2025-02-05 DIAGNOSIS — M54.16 RADICULOPATHY, LUMBAR REGION: ICD-10-CM

## 2025-02-05 DIAGNOSIS — F17.200 NICOTINE DEPENDENCE WITH CURRENT USE: ICD-10-CM

## 2025-02-05 DIAGNOSIS — R06.09 DOE (DYSPNEA ON EXERTION): Primary | ICD-10-CM

## 2025-02-05 PROCEDURE — 99204 OFFICE O/P NEW MOD 45 MIN: CPT | Performed by: INTERNAL MEDICINE

## 2025-02-05 PROCEDURE — G2211 COMPLEX E/M VISIT ADD ON: HCPCS | Performed by: INTERNAL MEDICINE

## 2025-02-05 PROCEDURE — 99406 BEHAV CHNG SMOKING 3-10 MIN: CPT | Performed by: INTERNAL MEDICINE

## 2025-02-05 RX ORDER — ALBUTEROL SULFATE 90 UG/1
2 INHALANT RESPIRATORY (INHALATION) EVERY 6 HOURS PRN
Qty: 18 G | Refills: 5 | Status: SHIPPED | OUTPATIENT
Start: 2025-02-05

## 2025-02-05 RX ORDER — NICOTINE 21 MG/24HR
1 PATCH, TRANSDERMAL 24 HOURS TRANSDERMAL EVERY 24 HOURS
Qty: 28 PATCH | Refills: 3 | Status: SHIPPED | OUTPATIENT
Start: 2025-02-05

## 2025-02-05 RX ORDER — NICOTINE 21 MG/24HR
1 PATCH, TRANSDERMAL 24 HOURS TRANSDERMAL EVERY 24 HOURS
Qty: 14 PATCH | Refills: 0 | Status: SHIPPED | OUTPATIENT
Start: 2025-02-05

## 2025-02-05 NOTE — PROGRESS NOTES
Consultation - Pulmonary Medicine   Erik Cordon 49 y.o. male MRN: [id number]    Physician Requesting Consult: Erendira Falk DO    Reason for Consult: SOB    WEAVER (dyspnea on exertion)  Patient can have underlying mild COPD or asthma but currently no evidence of exacerbation.  I believe his dyspnea exertion is exacerbated currently by his severe pain.  I decided to give him Ventolin to use as needed up to 4-6 times daily.  Will check PFTs.  I counseled him to quit smoking as below.  Will check chest x-ray.    Radiculopathy, lumbar region  He has severe pain in multiple areas, denies trauma, very restless, he will see his PCP in 2 days and he has follow-up with orthopedics and also was seen by neurology recently.  Continue to follow with pain management.    Nicotine dependence with current use  I counseled patient for about 5-6 minutes to quit smoking, he agreed to try nicotine patch, he will start with 21 mg daily and then when he is done with smoking for few weeks he can start weaning to 14 then 7 mg for 2 weeks each time.      Return in about 4 months (around 6/5/2025).    Diagnoses and all orders for this visit:    WEAVER (dyspnea on exertion)  -     Complete PFT with post bronchodilator; Future  -     albuterol (Ventolin HFA) 90 mcg/act inhaler; Inhale 2 puffs every 6 (six) hours as needed for wheezing  -     XR chest pa and lateral; Future    Radiculopathy, lumbar region    Nicotine dependence with current use  -     Complete PFT with post bronchodilator; Future  -     nicotine (NICODERM CQ) 21 mg/24 hr TD 24 hr patch; Place 1 patch on the skin over 24 hours every 24 hours  -     nicotine (NICODERM CQ) 14 mg/24hr TD 24 hr patch; Place 1 patch on the skin over 24 hours every 24 hours  -     nicotine (NICODERM CQ) 7 mg/24hr TD 24 hr patch; Place 1 patch on the skin over 24 hours every 24 hours      ______________________________________________________________________    HPI:    Erik  Arthur Cordon is a 49 y.o. male who presents for evaluation of shortness of breath.    She has been complaining of shortness of breath/dyspnea on exertion for few years as he states, denies any other symptoms such as cough or sputum production, denies wheezing, denies chest pain or tightness.  He is not on any inhalers.  He denies history of asthma.  Patient has significant chronic pain currently he points to his right shoulder and left lateral/posterior lower chest.  This pain has been going for a while on and off, he has also lower back pain with radiculopathy.  He follows with pain management and was seen by neurology and he has an appointment with orthopedics as well.  His pain is reproducible by touching the areas.  He denies any trauma.  He was very restless during the encounter and this has been going for a while as he states.  Patient denies GERD symptoms or dysphagia.  He has mild seasonal allergies during the summertime and takes over-the-counter medications.    He lives at home with his wife, he has a dog, he denies bird exposure, currently unemployed and he worked for [employer] and other jobs without occupational exposure before.  He smokes 0.5 pack/day and has been smoking for about 30 years.        Review of Systems:  Review of Systems   Constitutional: Negative.    HENT: Negative.     Eyes: Negative.    Respiratory:  Positive for shortness of breath.    Cardiovascular: Negative.    Gastrointestinal: Negative.    Endocrine: Negative.    Genitourinary: Negative.    Musculoskeletal:  Positive for back pain and myalgias.   Skin: Negative.    Allergic/Immunologic: Negative.    Neurological:  Positive for headaches.   Hematological: Negative.    Psychiatric/Behavioral: Negative.       Aside from what is mentioned in the HPI, the review of systems otherwise negative.    Current Medications:    Current Outpatient Medications:     ibuprofen (MOTRIN) 600 mg tablet, Take 1 tablet (600 mg total) by mouth every 6 (six)  "hours as needed for mild pain, Disp: 30 tablet, Rfl: 0    Historical Information   Past Medical History:   Diagnosis Date    Anxiety 2019    Chronic pain disorder     to back down to knees    GERD (gastroesophageal reflux disease)     Shortness of breath     with ambulation     Past Surgical History:   Procedure Laterality Date    LUMBAR EPIDURAL INJECTION      pain shot     Social History   Social History     Tobacco Use   Smoking Status Some Days    Current packs/day: 0.50    Average packs/day: 0.7 packs/day for 33.8 years (22.5 ttl pk-yrs)    Types: Cigarettes    Start date: 1/1/1997   Smokeless Tobacco Never       Occupational history:  No occupational exposure    Family History:   Family History   Problem Relation Age of Onset    No Known Problems Mother     No Known Problems Father     No Known Problems Sister     No Known Problems Brother     No Known Problems Maternal Grandmother     No Known Problems Maternal Grandfather     No Known Problems Paternal Grandmother     No Known Problems Paternal Grandfather     No Known Problems Maternal Aunt     No Known Problems Maternal Uncle     No Known Problems Paternal Aunt     No Known Problems Paternal Uncle     No Known Problems Cousin          PhysicalExamination:  Vitals:   /74 (BP Location: Left arm, Patient Position: Sitting, Cuff Size: Large)   Pulse 65   Temp 97.7 °F (36.5 °C) (Oral)   Resp 16   Ht 5' 6\" (1.676 m)   Wt 52.2 kg (115 lb)   SpO2 99%   BMI 18.56 kg/m²     Gen:  Comfortable on room air.  No conversational dyspnea  HEENT:  Conjugate gaze.  sclerae anicteric.  Oropharynx moist  Neck: Trachea is midline. No JVD. No adenopathy  Chest: Equal breath sounds and clear to auscultation bilaterally, no wheezing or crackles, good air movement  Cardiac: S1-S2 regular. no murmur  Abdomen:  benign  Extremities: No edema  Neuro:  Normal speech and mentation      Diagnostic Data:  Labs:  I personally reviewed the most recent laboratory data pertinent " "to today's visit    Lab Results   Component Value Date    WBC 6.33 01/15/2025    HGB 12.3 01/15/2025    HCT 37.7 01/15/2025    MCV 93 01/15/2025     01/15/2025     Lab Results   Component Value Date    CALCIUM 9.0 01/15/2025    K 4.0 01/15/2025    CO2 26 01/15/2025     01/15/2025    BUN 16 01/15/2025    CREATININE 0.85 01/15/2025     No results found for: \"IGE\"  Lab Results   Component Value Date    ALT 13 02/22/2024    AST 16 02/22/2024    ALKPHOS 74 02/22/2024             Other studies:  Echocardiogram: LVEF 74%, normal RV, mild to moderate AR    Cardiac nuclear stress test:  1.  Resting EKG shows slow R wave progression.  No change with Lexiscan  2.  No chest discomfort  3.  Normal myocardial perfusion scan  4.  Normal wall motion.  Calculated ejection fraction is 64%.           Sabino Maki MD  Answers submitted by the patient for this visit:  Pulmonology Questionnaire (Submitted on 2/5/2025)  Chief Complaint: Primary symptoms  Chronicity: new  When did you first notice your symptoms?: 1 to 4 weeks ago  How often do your symptoms occur?: daily  Since you first noticed this problem, how has it changed?: unchanged  Do you have shortness of breath that occurs with effort or exertion?: Yes  Do you have heartburn?: Yes  Do you have sweats?: Yes  Have you experienced weight loss?: Yes  Which of the following makes your symptoms worse?: nothing  Which of the following makes your symptoms better?: nothing    "

## 2025-02-05 NOTE — ASSESSMENT & PLAN NOTE
Patient can have underlying mild COPD or asthma but currently no evidence of exacerbation.  I believe his dyspnea exertion is exacerbated currently by his severe pain.  I decided to give him Ventolin to use as needed up to 4-6 times daily.  Will check PFTs.  I counseled him to quit smoking as below.  Will check chest x-ray.

## 2025-02-05 NOTE — ASSESSMENT & PLAN NOTE
He has severe pain in multiple areas, denies trauma, very restless, he will see his PCP in 2 days and he has follow-up with orthopedics and also was seen by neurology recently.  Continue to follow with pain management.

## 2025-02-05 NOTE — ASSESSMENT & PLAN NOTE
I counseled patient for about 5-6 minutes to quit smoking, he agreed to try nicotine patch, he will start with 21 mg daily and then when he is done with smoking for few weeks he can start weaning to 14 then 7 mg for 2 weeks each time.

## 2025-02-07 ENCOUNTER — OFFICE VISIT (OUTPATIENT)
Age: 50
End: 2025-02-07
Payer: COMMERCIAL

## 2025-02-07 VITALS
WEIGHT: 113.2 LBS | HEART RATE: 79 BPM | BODY MASS INDEX: 18.19 KG/M2 | HEIGHT: 66 IN | RESPIRATION RATE: 16 BRPM | DIASTOLIC BLOOD PRESSURE: 60 MMHG | OXYGEN SATURATION: 98 % | SYSTOLIC BLOOD PRESSURE: 110 MMHG | TEMPERATURE: 97.6 F

## 2025-02-07 DIAGNOSIS — M54.16 RADICULOPATHY, LUMBAR REGION: Primary | ICD-10-CM

## 2025-02-07 PROCEDURE — 99214 OFFICE O/P EST MOD 30 MIN: CPT | Performed by: FAMILY MEDICINE

## 2025-02-07 RX ORDER — PREGABALIN 25 MG/1
25 CAPSULE ORAL
Qty: 14 CAPSULE | Refills: 0 | Status: SHIPPED | OUTPATIENT
Start: 2025-02-07

## 2025-02-07 NOTE — PROGRESS NOTES
"Name: Erik Cordon      : 1975      MRN: [id number]  Encounter Provider: Erendira Falk DO  Encounter Date: 2025   Encounter department: [facility] [place]  :  Assessment & Plan  Radiculopathy, lumbar region  Persistent.  Needs better analgesia.  Will trial Lyrica.  Patient encouraged to follow-up with specialist.  Orders:    pregabalin (LYRICA) 25 mg capsule; Take 1 capsule (25 mg total) by mouth daily at bedtime           History of Present Illness   Patient presents for follow-up concerning chronic back and leg pain.  He has not returned to his normal appointment just yet.  Since that over-the-counter pain medication and previous therapy provided still are not helping with pain      Review of Systems   Constitutional:  Negative for fever.   Respiratory:  Negative for shortness of breath.    Cardiovascular:  Negative for chest pain and leg swelling.   Musculoskeletal:  Positive for back pain and gait problem.       Objective   /60 (BP Location: Left arm, Patient Position: Sitting, Cuff Size: Standard)   Pulse 79   Temp 97.6 °F (36.4 °C) (Tympanic)   Resp 16   Ht 5' 6\" (1.676 m)   Wt 51.3 kg (113 lb 3.2 oz)   SpO2 98%   BMI 18.27 kg/m²      Physical Exam  HENT:      Head: Normocephalic.   Eyes:      Conjunctiva/sclera: Conjunctivae normal.   Cardiovascular:      Rate and Rhythm: Normal rate and regular rhythm.   Pulmonary:      Effort: Pulmonary effort is normal.      Breath sounds: Normal breath sounds.   Neurological:      Mental Status: He is alert and oriented to person, place, and time.      Gait: Gait normal.         "

## 2025-02-07 NOTE — ASSESSMENT & PLAN NOTE
Persistent.  Needs better analgesia.  Will trial Lyrica.  Patient encouraged to follow-up with specialist.  Orders:    pregabalin (LYRICA) 25 mg capsule; Take 1 capsule (25 mg total) by mouth daily at bedtime

## 2025-02-11 ENCOUNTER — TELEPHONE (OUTPATIENT)
Age: 50
End: 2025-02-11

## 2025-02-19 ENCOUNTER — TELEPHONE (OUTPATIENT)
Dept: NEUROLOGY | Facility: CLINIC | Age: 50
End: 2025-02-19

## 2025-02-20 ENCOUNTER — TELEPHONE (OUTPATIENT)
Age: 50
End: 2025-02-20

## 2025-02-20 ENCOUNTER — TELEPHONE (OUTPATIENT)
Dept: GASTROENTEROLOGY | Facility: CLINIC | Age: 50
End: 2025-02-20

## 2025-02-20 ENCOUNTER — HOSPITAL ENCOUNTER (OUTPATIENT)
Dept: MRI IMAGING | Facility: CLINIC | Age: 50
Discharge: HOME | End: 2025-02-20
Payer: COMMERCIAL

## 2025-02-20 ENCOUNTER — HOSPITAL ENCOUNTER (OUTPATIENT)
Facility: HOSPITAL | Age: 50
Setting detail: OBSERVATION
Discharge: HOME | End: 2025-02-21
Attending: EMERGENCY MEDICINE | Admitting: INTERNAL MEDICINE
Payer: COMMERCIAL

## 2025-02-20 ENCOUNTER — HOSPITAL ENCOUNTER (OUTPATIENT)
Dept: VASCULAR ULTRASOUND | Facility: HOSPITAL | Age: 50
Discharge: HOME | End: 2025-02-20
Payer: COMMERCIAL

## 2025-02-20 ENCOUNTER — APPOINTMENT (EMERGENCY)
Dept: CT IMAGING | Facility: HOSPITAL | Age: 50
End: 2025-02-20
Payer: COMMERCIAL

## 2025-02-20 ENCOUNTER — TELEPHONE (OUTPATIENT)
Dept: NEUROLOGY | Facility: CLINIC | Age: 50
End: 2025-02-20

## 2025-02-20 DIAGNOSIS — I74.5 OCCLUSION OF EXTERNAL ILIAC ARTERY (HCC): Primary | ICD-10-CM

## 2025-02-20 DIAGNOSIS — I73.9 CLAUDICATION IN PERIPHERAL VASCULAR DISEASE (HCC): ICD-10-CM

## 2025-02-20 DIAGNOSIS — M54.16 RADICULOPATHY, LUMBAR REGION: ICD-10-CM

## 2025-02-20 DIAGNOSIS — I73.9 SEVERE PERIPHERAL ARTERIAL DISEASE (HCC): ICD-10-CM

## 2025-02-20 LAB
2HR DELTA HS TROPONIN: 8 NG/L
ALBUMIN SERPL BCG-MCNC: 4.3 G/DL (ref 3.5–5)
ALP SERPL-CCNC: 83 U/L (ref 34–104)
ALT SERPL W P-5'-P-CCNC: 10 U/L (ref 7–52)
ANION GAP SERPL CALCULATED.3IONS-SCNC: 6 MMOL/L (ref 4–13)
APTT PPP: 28 SECONDS (ref 23–34)
AST SERPL W P-5'-P-CCNC: 17 U/L (ref 13–39)
ATRIAL RATE: 86 BPM
BASOPHILS # BLD MANUAL: 0 THOUSAND/UL (ref 0–0.1)
BASOPHILS NFR MAR MANUAL: 0 % (ref 0–1)
BILIRUB SERPL-MCNC: 0.51 MG/DL (ref 0.2–1)
BUN SERPL-MCNC: 17 MG/DL (ref 5–25)
CALCIUM SERPL-MCNC: 9.3 MG/DL (ref 8.4–10.2)
CARDIAC TROPONIN I PNL SERPL HS: 30 NG/L (ref ?–50)
CARDIAC TROPONIN I PNL SERPL HS: 38 NG/L (ref ?–50)
CARDIAC TROPONIN I PNL SERPL HS: 42 NG/L (ref ?–50)
CHLORIDE SERPL-SCNC: 105 MMOL/L (ref 96–108)
CO2 SERPL-SCNC: 24 MMOL/L (ref 21–32)
CREAT SERPL-MCNC: 1.06 MG/DL (ref 0.6–1.3)
EOSINOPHIL # BLD MANUAL: 0.11 THOUSAND/UL (ref 0–0.4)
EOSINOPHIL NFR BLD MANUAL: 2 % (ref 0–6)
ERYTHROCYTE [DISTWIDTH] IN BLOOD BY AUTOMATED COUNT: 12.8 % (ref 11.6–15.1)
GFR SERPL CREATININE-BSD FRML MDRD: 83 ML/MIN/1.73SQ M
GLUCOSE SERPL-MCNC: 77 MG/DL (ref 65–140)
HCT VFR BLD AUTO: 42.4 % (ref 36.5–49.3)
HGB BLD-MCNC: 13.4 G/DL (ref 12–17)
INR PPP: 0.89 (ref 0.85–1.19)
LYMPHOCYTES # BLD AUTO: 1.84 THOUSAND/UL (ref 0.6–4.47)
LYMPHOCYTES # BLD AUTO: 29 % (ref 14–44)
MCH RBC QN AUTO: 29.8 PG (ref 26.8–34.3)
MCHC RBC AUTO-ENTMCNC: 31.6 G/DL (ref 31.4–37.4)
MCV RBC AUTO: 94 FL (ref 82–98)
MONOCYTES # BLD AUTO: 0.39 THOUSAND/UL (ref 0–1.22)
MONOCYTES NFR BLD: 7 % (ref 4–12)
NEUTROPHILS # BLD MANUAL: 3.24 THOUSAND/UL (ref 1.85–7.62)
NEUTS SEG NFR BLD AUTO: 58 % (ref 43–75)
P AXIS: 81 DEGREES
PLATELET # BLD AUTO: 160 THOUSANDS/UL (ref 149–390)
PLATELET BLD QL SMEAR: ADEQUATE
PMV BLD AUTO: 10.3 FL (ref 8.9–12.7)
POTASSIUM SERPL-SCNC: 4.7 MMOL/L (ref 3.5–5.3)
PR INTERVAL: 150 MS
PROT SERPL-MCNC: 8.1 G/DL (ref 6.4–8.4)
PROTHROMBIN TIME: 12.7 SECONDS (ref 12.3–15)
QRS AXIS: 28 DEGREES
QRSD INTERVAL: 76 MS
QT INTERVAL: 342 MS
QTC INTERVAL: 409 MS
RBC # BLD AUTO: 4.49 MILLION/UL (ref 3.88–5.62)
RBC MORPH BLD: PRESENT
SODIUM SERPL-SCNC: 135 MMOL/L (ref 135–147)
T WAVE AXIS: 47 DEGREES
VARIANT LYMPHS # BLD AUTO: 4 %
VENTRICULAR RATE: 86 BPM
WBC # BLD AUTO: 5.58 THOUSAND/UL (ref 4.31–10.16)

## 2025-02-20 PROCEDURE — 85730 THROMBOPLASTIN TIME PARTIAL: CPT | Performed by: EMERGENCY MEDICINE

## 2025-02-20 PROCEDURE — 84484 ASSAY OF TROPONIN QUANT: CPT

## 2025-02-20 PROCEDURE — 99285 EMERGENCY DEPT VISIT HI MDM: CPT | Performed by: EMERGENCY MEDICINE

## 2025-02-20 PROCEDURE — 85610 PROTHROMBIN TIME: CPT | Performed by: EMERGENCY MEDICINE

## 2025-02-20 PROCEDURE — 80053 COMPREHEN METABOLIC PANEL: CPT

## 2025-02-20 PROCEDURE — 72148 MRI LUMBAR SPINE W/O DYE: CPT

## 2025-02-20 PROCEDURE — 99222 1ST HOSP IP/OBS MODERATE 55: CPT | Performed by: INTERNAL MEDICINE

## 2025-02-20 PROCEDURE — 99284 EMERGENCY DEPT VISIT MOD MDM: CPT

## 2025-02-20 PROCEDURE — 85007 BL SMEAR W/DIFF WBC COUNT: CPT

## 2025-02-20 PROCEDURE — 93925 LOWER EXTREMITY STUDY: CPT

## 2025-02-20 PROCEDURE — 93010 ELECTROCARDIOGRAM REPORT: CPT | Performed by: INTERNAL MEDICINE

## 2025-02-20 PROCEDURE — 93923 UPR/LXTR ART STDY 3+ LVLS: CPT

## 2025-02-20 PROCEDURE — 36415 COLL VENOUS BLD VENIPUNCTURE: CPT

## 2025-02-20 PROCEDURE — 93005 ELECTROCARDIOGRAM TRACING: CPT

## 2025-02-20 PROCEDURE — 75635 CT ANGIO ABDOMINAL ARTERIES: CPT

## 2025-02-20 PROCEDURE — 84484 ASSAY OF TROPONIN QUANT: CPT | Performed by: INTERNAL MEDICINE

## 2025-02-20 PROCEDURE — 96374 THER/PROPH/DIAG INJ IV PUSH: CPT

## 2025-02-20 PROCEDURE — 85027 COMPLETE CBC AUTOMATED: CPT

## 2025-02-20 RX ORDER — PREGABALIN 25 MG/1
25 CAPSULE ORAL
Status: DISCONTINUED | OUTPATIENT
Start: 2025-02-20 | End: 2025-02-21 | Stop reason: HOSPADM

## 2025-02-20 RX ORDER — ACETAMINOPHEN 325 MG/1
650 TABLET ORAL ONCE
Status: COMPLETED | OUTPATIENT
Start: 2025-02-20 | End: 2025-02-20

## 2025-02-20 RX ORDER — FENTANYL CITRATE 50 UG/ML
50 INJECTION, SOLUTION INTRAMUSCULAR; INTRAVENOUS ONCE
Refills: 0 | Status: COMPLETED | OUTPATIENT
Start: 2025-02-20 | End: 2025-02-20

## 2025-02-20 RX ORDER — NICOTINE 21 MG/24HR
1 PATCH, TRANSDERMAL 24 HOURS TRANSDERMAL DAILY
Status: DISCONTINUED | OUTPATIENT
Start: 2025-02-21 | End: 2025-02-21 | Stop reason: HOSPADM

## 2025-02-20 RX ADMIN — FENTANYL CITRATE 50 MCG: 50 INJECTION INTRAMUSCULAR; INTRAVENOUS at 17:15

## 2025-02-20 RX ADMIN — ACETAMINOPHEN 650 MG: 325 TABLET, FILM COATED ORAL at 16:42

## 2025-02-20 RX ADMIN — IOHEXOL 100 ML: 350 INJECTION, SOLUTION INTRAVENOUS at 18:15

## 2025-02-20 RX ADMIN — PREGABALIN 25 MG: 25 CAPSULE ORAL at 23:09

## 2025-02-20 NOTE — TELEPHONE ENCOUNTER
Inbound call received from Jaz at the Vascular Lab [facility] to discuss Arterial Doppler findings with Dr. Goldberg but Dr. Goldberg is out of office today. Per [facility] Dr. Valencia is covering. Jaz made aware.     Call back number for Vascular lab provided: [phone]. Jaz states she will reach out to Dr. Valencia via [facility] Secure Chat.       Dr. Goldberg: Sent for your awareness, thank you!

## 2025-02-20 NOTE — TELEPHONE ENCOUNTER
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 His SHIREEN on the left is 0.49 which is in the ischemic range and the right leg shows a few areas of stenosis but no major occlusions with an SHIREEN of 0.80.\"    Due to critical findings of left external iliac artery occlusion and progressive pain in the LLE I did advise the patient to go to the ED to get input from vascular surgery.   "

## 2025-02-20 NOTE — ED PROVIDER NOTES
Time reflects when diagnosis was documented in both MDM as applicable and the Disposition within this note       Time User Action Codes Description Comment    2/20/2025  8:01 PM Kenisha Alex Add [I74.5] Occlusion of external iliac artery (HCC)     2/20/2025  8:01 PM Kenisha Alex Add [I73.9] Claudication in peripheral vascular disease (HCC)           ED Disposition       ED Disposition   Admit    Condition   Stable    Date/Time   Thu Feb 20, 2025  8:00 PM    Comment   Case was discussed with vascular surgery, [facility] attending and the patient's admission status was agreed to be Admission Status: observation status to the service of Dr. Choudhary .               Assessment & Plan       Medical Decision Making  The patient is a 49 yoM who presented to the ED due to abnormal vascular study results. He has had ongoing workup for lower extremity pain.  He has been seen by neurology who recommended MRI of the lumbar spine which was obtained through the ER.  No acute pathology identified.  Subsequently referred for vascular studies for further evaluation of claudication.  It was apparently unclear if this was vascular or neurogenic claudication.  He reports significant pain in his proximal left lower extremities with activity.  An arterial duplex obtained today as an outpatient was grossly abnormal with an occlusion of the mid distal external iliac artery.  Collateral flow was noted, however SHIREEN was abnormal.  Patient does have faintly palpable 1+ DP pulse on the left relative to a 2+ pulse on the right.    Amount and/or Complexity of Data Reviewed  External Data Reviewed: radiology and notes.     Details: Prior notes and radiology studies reviewed   Labs: ordered.  Radiology: ordered. Decision-making details documented in ED Course.  ECG/medicine tests: independent interpretation performed.     Details: NSR, septal Q waves, abnormal EKG, no acute ischemia, no prior available  Discussion of management or test  "interpretation with external provider(s): Reviewed presentation and exam with vascular surgery: presentation c/w lifestyle limiting claudication w/ rest pain that will require intervention. Reccs as below:   If + fem pulses - abdominal iliac duplex  If - fem pulses - abdominal iliac runoff  routine vasc consult if admitted for pain control     Risk  OTC drugs.  Prescription drug management.  Parenteral controlled substances.  Decision regarding hospitalization.  Emergency major surgery.        ED Course as of 02/20/25 2006   Thu Feb 20, 2025   1608 VAS ARTERIAL DUPLEX- LOWER LIMB BILATERAL (2/20/25 1150)  Diffuse disease noted throughout the femoral-popliteal arteries with evidence  of an occlusion of the mid-distal external iliac artery with collateral flow  noted to the common femoral artery. Monophasic flow is noted throughout the  lower extremity distal to the occlusion.  Ankle/Brachial index:  0.49 which is in the ischemic disease category.  PVR/ PPG tracings are dampened.  Metatarsal pressure of 62 mmHg  Great toe pressure of 30 mmHg, borderline the healing range     1633 If + fem pulses - abdominal iliac duplex  If - fem pulses - abdominal iliac runoff  Lifestyle limiting claudication w/ rest pain, routine vasc if admitted       Medications   acetaminophen (TYLENOL) tablet 650 mg (650 mg Oral Given 2/20/25 1642)   fentaNYL injection 50 mcg (50 mcg Intravenous Given 2/20/25 1715)   iohexol (OMNIPAQUE) 350 MG/ML injection (MULTI-DOSE) 100 mL (100 mL Intravenous Given 2/20/25 1815)       ED Risk Strat Scores                                                History of Present Illness       Chief Complaint   Patient presents with    Evaluation of Abnormal Diagnostic Test     Sent in for vascular surgery per dr note \"he does have an occlusion of the left external iliac artery, \" + LLE pain, + smoker + sob        Past Medical History:   Diagnosis Date    Anxiety 2019    Chronic pain disorder     to back down to knees "    GERD (gastroesophageal reflux disease)     Shortness of breath     with ambulation      Past Surgical History:   Procedure Laterality Date    LUMBAR EPIDURAL INJECTION      pain shot      Family History   Problem Relation Age of Onset    No Known Problems Mother     No Known Problems Father     No Known Problems Sister     No Known Problems Brother     No Known Problems Maternal Grandmother     No Known Problems Maternal Grandfather     No Known Problems Paternal Grandmother     No Known Problems Paternal Grandfather     No Known Problems Maternal Aunt     No Known Problems Maternal Uncle     No Known Problems Paternal Aunt     No Known Problems Paternal Uncle     No Known Problems Cousin       Social History     Tobacco Use    Smoking status: Some Days     Current packs/day: 0.50     Average packs/day: 0.7 packs/day for 33.8 years (22.6 ttl pk-yrs)     Types: Cigarettes     Start date: 1/1/1997    Smokeless tobacco: Never   Vaping Use    Vaping status: Never Used   Substance Use Topics    Alcohol use: Not Currently     Comment: holidays    Drug use: Yes     Frequency: 7.0 times per week     Types: Marijuana     Comment: 4 times a day      E-Cigarette/Vaping    E-Cigarette Use Never User       E-Cigarette/Vaping Substances    Nicotine No     THC No     CBD No     Flavoring No     Other No     Unknown No       I have reviewed and agree with the history as documented.     Patient referred to the ED for urgent eval of abnormal vascular study           Review of Systems   Musculoskeletal:  Positive for myalgias.           Objective       ED Triage Vitals   Temperature Pulse Blood Pressure Respirations SpO2 Patient Position - Orthostatic VS   02/20/25 1506 02/20/25 1506 02/20/25 1506 02/20/25 1506 02/20/25 1506 02/20/25 1506   98 °F (36.7 °C) 90 119/81 18 95 % Sitting      Temp Source Heart Rate Source BP Location FiO2 (%) Pain Score    02/20/25 1506 02/20/25 1506 02/20/25 1506 -- 02/20/25 1642    Temporal Monitor  Left arm  10 - Worst Possible Pain      Vitals      Date and Time Temp Pulse SpO2 Resp BP Pain Score FACES Pain Rating User   02/20/25 1900 -- 60 100 % 18 113/57 -- -- SB   02/20/25 1830 -- 65 100 % 17 113/74 -- -- MB   02/20/25 1745 -- 63 99 % 17 130/65 -- -- MB   02/20/25 1715 -- -- -- -- -- 10 - Worst Possible Pain -- MB   02/20/25 1700 -- 62 100 % 17 131/69 10 - Worst Possible Pain -- MB   02/20/25 1645 -- 71 100 % 18 126/64 10 - Worst Possible Pain -- MB   02/20/25 1642 -- -- -- -- -- 10 - Worst Possible Pain -- MB   02/20/25 1506 98 °F (36.7 °C) 90 95 % 18 119/81 -- -- RO            Physical Exam  Vitals and nursing note reviewed.   Constitutional:       General: He is not in acute distress.     Appearance: Normal appearance.   HENT:      Head: Normocephalic and atraumatic.      Right Ear: External ear normal.      Left Ear: External ear normal.      Nose: Nose normal.   Cardiovascular:      Rate and Rhythm: Normal rate and regular rhythm.      Pulses:           Femoral pulses are 3+ on the right side and 0 on the left side.       Dorsalis pedis pulses are 2+ on the right side and 1+ on the left side.   Pulmonary:      Effort: Pulmonary effort is normal.      Breath sounds: Normal breath sounds.   Abdominal:      General: There is no distension.      Palpations: Abdomen is soft.      Tenderness: There is no abdominal tenderness.   Musculoskeletal:      Right lower leg: No edema.      Left lower leg: No edema.   Feet:      Right foot:      Skin integrity: Skin integrity normal.      Left foot:      Skin integrity: Skin integrity normal.      Comments: Warm, sensation to light touch intact  Skin:     General: Skin is warm and dry.   Neurological:      General: No focal deficit present.      Mental Status: He is alert and oriented to person, place, and time. Mental status is at baseline.   Psychiatric:         Behavior: Behavior normal.         Results Reviewed       Procedure Component Value Units Date/Time     HS Troponin I 2hr [[id number]]  (Normal) Collected: 02/20/25 1727    Lab Status: Final result Specimen: Blood from Arm, Right Updated: 02/20/25 1757     hs TnI 2hr 38 ng/L      Delta 2hr hsTnI 8 ng/L     Protime-INR [[id number]]  (Normal) Collected: 02/20/25 1513    Lab Status: Final result Specimen: Blood from Arm, Right Updated: 02/20/25 1703     Protime 12.7 seconds      INR 0.89    Narrative:      INR Therapeutic Range    Indication                                             INR Range      Atrial Fibrillation                                               2.0-3.0  Hypercoagulable State                                    2.0.2.3  Left Ventricular Asist Device                            2.0-3.0  Mechanical Heart Valve                                  -    Aortic(with afib, MI, embolism, HF, LA enlargement,    and/or coagulopathy)                                     2.0-3.0 (2.5-3.5)     Mitral                                                             2.5-3.5  Prosthetic/Bioprosthetic Heart Valve               2.0-3.0  Venous thromboembolism (VTE: VT, PE        2.0-3.0    APTT [[id number]]  (Normal) Collected: 02/20/25 1513    Lab Status: Final result Specimen: Blood from Arm, Right Updated: 02/20/25 1703     PTT 28 seconds     RBC Morphology Reflex Test [[id number]] Collected: 02/20/25 1513    Lab Status: Final result Specimen: Blood from Arm, Right Updated: 02/20/25 1701    CBC and differential [[id number]]  (Normal) Collected: 02/20/25 1513    Lab Status: Final result Specimen: Blood from Arm, Right Updated: 02/20/25 1620     WBC 5.58 Thousand/uL      RBC 4.49 Million/uL      Hemoglobin 13.4 g/dL      Hematocrit 42.4 %      MCV 94 fL      MCH 29.8 pg      MCHC 31.6 g/dL      RDW 12.8 %      MPV 10.3 fL      Platelets 160 Thousands/uL     Narrative:      This is an appended report.  These results have been appended to a previously verified report.    Manual Differential(PHLEBS Do Not Order) [[id number]]  (Abnormal)  Collected: 02/20/25 1513    Lab Status: Final result Specimen: Blood from Arm, Right Updated: 02/20/25 1620     Segmented % 58 %      Lymphocytes % 29 %      Monocytes % 7 %      Eosinophils % 2 %      Basophils % 0 %      Atypical Lymphocytes % 4 %      Absolute Neutrophils 3.24 Thousand/uL      Absolute Lymphocytes 1.84 Thousand/uL      Absolute Monocytes 0.39 Thousand/uL      Absolute Eosinophils 0.11 Thousand/uL      Absolute Basophils 0.00 Thousand/uL      Total Counted --     RBC Morphology Present     Platelet Estimate Adequate    HS Troponin I 4hr [[id number]]     Lab Status: No result Specimen: Blood     HS Troponin 0hr (reflex protocol) [[id number]]  (Normal) Collected: 02/20/25 1513    Lab Status: Final result Specimen: Blood from Arm, Right Updated: 02/20/25 1546     hs TnI 0hr 30 ng/L     Comprehensive metabolic panel [[id number]] Collected: 02/20/25 1513    Lab Status: Final result Specimen: Blood from Arm, Right Updated: 02/20/25 1538     Sodium 135 mmol/L      Potassium 4.7 mmol/L      Chloride 105 mmol/L      CO2 24 mmol/L      ANION GAP 6 mmol/L      BUN 17 mg/dL      Creatinine 1.06 mg/dL      Glucose 77 mg/dL      Calcium 9.3 mg/dL      AST 17 U/L      ALT 10 U/L      Alkaline Phosphatase 83 U/L      Total Protein 8.1 g/dL      Albumin 4.3 g/dL      Total Bilirubin 0.51 mg/dL      eGFR 83 ml/min/1.73sq m     Narrative:      National Kidney Disease Foundation guidelines for Chronic Kidney Disease (CKD):     Stage 1 with normal or high GFR (GFR > 90 mL/min/1.73 square meters)    Stage 2 Mild CKD (GFR = 60-89 mL/min/1.73 square meters)    Stage 3A Moderate CKD (GFR = 45-59 mL/min/1.73 square meters)    Stage 3B Moderate CKD (GFR = 30-44 mL/min/1.73 square meters)    Stage 4 Severe CKD (GFR = 15-29 mL/min/1.73 square meters)    Stage 5 End Stage CKD (GFR <15 mL/min/1.73 square meters)  Note: GFR calculation is accurate only with a steady state creatinine            CTA abdominal w run off w wo contrast    Final Interpretation by Eddy Ortiz MD (02/20 1903)   Occlusion of the entirety of the left external iliac artery, with reconstitution present at the level of the left common femoral artery.      Right lower extremity: Moderate to severe diffuse stenosis present at the right common femoral artery. Multifocal moderate stenosis present at the proximal and mid right popliteal artery. Three-vessel runoff is present to the right foot.      Left lower extremity: Moderate to severe diffuse stenosis present at the left common femoral artery. Mild to moderate stenosis present at the proximal left SFA. Mild to moderate stenosis present at the left popliteal artery. Three-vessel runoff is    present to the left foot.      Diffuse stenosis with focal occlusion present at the proximal celiac artery, with reconstitution present distally.      The study was marked in EPIC for significant notification.      Workstation performed: [id number]             Procedures    ED Medication and Procedure Management   Prior to Admission Medications   Prescriptions Last Dose Informant Patient Reported? Taking?   albuterol (Ventolin HFA) 90 mcg/act inhaler   No No   Sig: Inhale 2 puffs every 6 (six) hours as needed for wheezing   ibuprofen (MOTRIN) 600 mg tablet   No No   Sig: Take 1 tablet (600 mg total) by mouth every 6 (six) hours as needed for mild pain   nicotine (NICODERM CQ) 14 mg/24hr TD 24 hr patch   No No   Sig: Place 1 patch on the skin over 24 hours every 24 hours   Patient not taking: Reported on 2/7/2025   nicotine (NICODERM CQ) 21 mg/24 hr TD 24 hr patch   No No   Sig: Place 1 patch on the skin over 24 hours every 24 hours   nicotine (NICODERM CQ) 7 mg/24hr TD 24 hr patch   No No   Sig: Place 1 patch on the skin over 24 hours every 24 hours   Patient not taking: Reported on 2/7/2025   pregabalin (LYRICA) 25 mg capsule   No No   Sig: Take 1 capsule (25 mg total) by mouth daily at bedtime      Facility-Administered  Medications: None     Patient's Medications   Discharge Prescriptions    No medications on file     No discharge procedures on file.  ED SEPSIS DOCUMENTATION   Time reflects when diagnosis was documented in both MDM as applicable and the Disposition within this note       Time User Action Codes Description Comment    2/20/2025  8:01 PM Kenisha Alex Add [I74.5] Occlusion of external iliac artery (HCC)     2/20/2025  8:01 PM Kenisha Alex Add [I73.9] Claudication in peripheral vascular disease (HCC)                  Kenisha Alex MD  02/20/25 2006

## 2025-02-21 ENCOUNTER — DOCUMENTATION (OUTPATIENT)
Dept: PAIN MEDICINE | Facility: CLINIC | Age: 50
End: 2025-02-21

## 2025-02-21 ENCOUNTER — TELEPHONE (OUTPATIENT)
Dept: GASTROENTEROLOGY | Facility: CLINIC | Age: 50
End: 2025-02-21

## 2025-02-21 VITALS
TEMPERATURE: 98.4 F | RESPIRATION RATE: 18 BRPM | DIASTOLIC BLOOD PRESSURE: 56 MMHG | HEIGHT: 66 IN | OXYGEN SATURATION: 97 % | WEIGHT: 113.76 LBS | BODY MASS INDEX: 18.28 KG/M2 | SYSTOLIC BLOOD PRESSURE: 109 MMHG | HEART RATE: 51 BPM

## 2025-02-21 LAB
2HR DELTA HS TROPONIN: 3 NG/L
4HR DELTA HS TROPONIN: 3 NG/L
CARDIAC TROPONIN I PNL SERPL HS: 45 NG/L (ref ?–50)
CARDIAC TROPONIN I PNL SERPL HS: 45 NG/L (ref ?–50)

## 2025-02-21 PROCEDURE — 99222 1ST HOSP IP/OBS MODERATE 55: CPT | Performed by: STUDENT IN AN ORGANIZED HEALTH CARE EDUCATION/TRAINING PROGRAM

## 2025-02-21 PROCEDURE — 84484 ASSAY OF TROPONIN QUANT: CPT | Performed by: INTERNAL MEDICINE

## 2025-02-21 PROCEDURE — 93922 UPR/L XTREMITY ART 2 LEVELS: CPT | Performed by: SURGERY

## 2025-02-21 PROCEDURE — 99239 HOSP IP/OBS DSCHRG MGMT >30: CPT | Performed by: STUDENT IN AN ORGANIZED HEALTH CARE EDUCATION/TRAINING PROGRAM

## 2025-02-21 PROCEDURE — 93925 LOWER EXTREMITY STUDY: CPT | Performed by: SURGERY

## 2025-02-21 RX ORDER — ENOXAPARIN SODIUM 100 MG/ML
40 INJECTION SUBCUTANEOUS
Status: DISCONTINUED | OUTPATIENT
Start: 2025-02-21 | End: 2025-02-21

## 2025-02-21 RX ORDER — OXYCODONE HYDROCHLORIDE 5 MG/1
5 TABLET ORAL EVERY 4 HOURS PRN
Qty: 10 TABLET | Refills: 0 | Status: SHIPPED | OUTPATIENT
Start: 2025-02-21 | End: 2025-02-21

## 2025-02-21 RX ORDER — HEPARIN SODIUM 5000 [USP'U]/ML
5000 INJECTION, SOLUTION INTRAVENOUS; SUBCUTANEOUS EVERY 8 HOURS SCHEDULED
Status: DISCONTINUED | OUTPATIENT
Start: 2025-02-21 | End: 2025-02-21

## 2025-02-21 RX ORDER — ATORVASTATIN CALCIUM 20 MG/1
20 TABLET, FILM COATED ORAL
Qty: 30 TABLET | Refills: 0 | Status: SHIPPED | OUTPATIENT
Start: 2025-02-21

## 2025-02-21 RX ORDER — ATORVASTATIN CALCIUM 20 MG/1
20 TABLET, FILM COATED ORAL
Qty: 30 TABLET | Refills: 0 | Status: SHIPPED | OUTPATIENT
Start: 2025-02-21 | End: 2025-02-21

## 2025-02-21 RX ORDER — HEPARIN SODIUM 5000 [USP'U]/ML
5000 INJECTION, SOLUTION INTRAVENOUS; SUBCUTANEOUS EVERY 12 HOURS SCHEDULED
Status: DISCONTINUED | OUTPATIENT
Start: 2025-02-21 | End: 2025-02-21 | Stop reason: HOSPADM

## 2025-02-21 RX ORDER — OXYCODONE HYDROCHLORIDE 5 MG/1
5 TABLET ORAL EVERY 4 HOURS PRN
Qty: 5 TABLET | Refills: 0 | Status: SHIPPED | OUTPATIENT
Start: 2025-02-21 | End: 2025-03-03

## 2025-02-21 RX ORDER — CLOPIDOGREL BISULFATE 75 MG/1
75 TABLET ORAL DAILY
Status: DISCONTINUED | OUTPATIENT
Start: 2025-02-21 | End: 2025-02-21 | Stop reason: HOSPADM

## 2025-02-21 RX ORDER — CLOPIDOGREL BISULFATE 75 MG/1
75 TABLET ORAL DAILY
Qty: 30 TABLET | Refills: 0 | Status: SHIPPED | OUTPATIENT
Start: 2025-02-22 | End: 2025-02-21

## 2025-02-21 RX ORDER — CLOPIDOGREL BISULFATE 75 MG/1
75 TABLET ORAL DAILY
Qty: 30 TABLET | Refills: 0 | Status: SHIPPED | OUTPATIENT
Start: 2025-02-22

## 2025-02-21 RX ORDER — SODIUM CHLORIDE 9 MG/ML
75 INJECTION, SOLUTION INTRAVENOUS CONTINUOUS
Status: DISCONTINUED | OUTPATIENT
Start: 2025-02-21 | End: 2025-02-21 | Stop reason: HOSPADM

## 2025-02-21 RX ORDER — MORPHINE SULFATE 4 MG/ML
4 INJECTION, SOLUTION INTRAMUSCULAR; INTRAVENOUS EVERY 4 HOURS PRN
Status: DISCONTINUED | OUTPATIENT
Start: 2025-02-21 | End: 2025-02-21 | Stop reason: HOSPADM

## 2025-02-21 RX ORDER — ATORVASTATIN CALCIUM 20 MG/1
20 TABLET, FILM COATED ORAL
Status: DISCONTINUED | OUTPATIENT
Start: 2025-02-21 | End: 2025-02-21 | Stop reason: HOSPADM

## 2025-02-21 RX ADMIN — SODIUM CHLORIDE 75 ML/HR: 0.9 INJECTION, SOLUTION INTRAVENOUS at 13:22

## 2025-02-21 RX ADMIN — CLOPIDOGREL 75 MG: 75 TABLET ORAL at 13:22

## 2025-02-21 RX ADMIN — MORPHINE SULFATE 4 MG: 4 INJECTION INTRAVENOUS at 00:43

## 2025-02-21 RX ADMIN — HEPARIN SODIUM 5000 UNITS: 5000 INJECTION, SOLUTION INTRAVENOUS; SUBCUTANEOUS at 09:51

## 2025-02-21 NOTE — MALNUTRITION/BMI
This medical record reflects one or more clinical indicators suggestive of malnutrition     Malnutrition Findings:   Adult Malnutrition type: Chronic illness  Adult Degree of Malnutrition: Malnutrition of moderate degree  Malnutrition Characteristics: Inadequate energy, Muscle loss          360 Statement: Related to lack of appetite as evidenced by mild depletion of muscle mass (clavicle) and <75% energy intake needs met >1 month treated with diet and oral nutrition supplements        See Nutrition note dated 2/21/25 for additional details.  Completed nutrition assessment is viewable in the nutrition documentation.

## 2025-02-21 NOTE — ASSESSMENT & PLAN NOTE
49-year-old male, current smoker, with PMH lumbar spinal stenosis.  Patient presented to the [facility] emergency department on 2/20/2025 due to persistent severe bilateral lower extremity pain, L>R.  An arterial duplex completed earlier that day indicated complete left external iliac artery occlusion with SHIREEN and ischemic range.  Vascular surgery was consulted for evaluation.      Imaging:  LEAD 2/20/2025:  >70% stenosis of the right proximal profunda, 50-75% stenosis of the distal right SFA, occlusion of the left mid-distal external iliac artery with collateral flow noted to the left CFA. Monophasic flow is noted throughout the left lower extremity distal to the occlusion.  R SHIREEN:  0.80/108/74, L SHIREEN: 0.49/62/30  CTA abdomen with runoff 2/20/2025:  Occlusion of the entirety of the left external iliac artery, with reconstitution present at the level of the left common femoral artery. Moderate to severe diffuse stenosis present at the bilateral common femoral artery. Multifocal moderate stenosis present at the proximal/mid right popliteal artery, left proximal SFA, and left popliteal artery. Three-vessel runoff is present to bilateral feet Diffuse stenosis with focal occlusion present at the proximal celiac artery, with reconstitution present distally.    Plan:  -Left lower extremity short distance claudication, motor/sensory intact without signs of acute limb ischemia  -CTA noting occlusion of entire left external iliac artery with distal reconstitution.  -Recommend medical optimization with Plavix and statin therapy  -Patient will likely require revascularization, however in the absence of tissue loss or rest pain this can be arranged with short interval outpatient follow up with a vascular surgeon  -Pain management per primary team  -D/w Dr. Griffin

## 2025-02-21 NOTE — CASE MANAGEMENT
Case Management Assessment & Discharge Planning Note    Patient name Erik Cordon  Location /-01 MRN [id number]  : 1975 Date 2025       Current Admission Date: 2025  Current Admission Diagnosis:Severe peripheral arterial disease (HCC)   Patient Active Problem List    Diagnosis Date Noted Date Diagnosed    Severe peripheral arterial disease (HCC) 2025     WEAVER (dyspnea on exertion) 2025     Radiculopathy, lumbar region 01/15/2025     Nicotine dependence with current use 2024     GERD (gastroesophageal reflux disease)      Left leg pain 2024       LOS (days): 0  Geometric Mean LOS (GMLOS) (days):   Days to GMLOS:     OBJECTIVE:              Current admission status: Observation       Preferred Pharmacy:   [facility] - [place] - [address]  [address] [facility] [address]  Phone: [phone] Fax: [phone]    [id number] - [place] - [address] [address]  Phone: [phone] Fax: [phone]    Primary Care Provider: Erendira Falk DO    Primary Insurance: CHARU LOCKE  Secondary Insurance:     ASSESSMENT:  Active Health Care Proxies       [address] [place] [facility] Care Representative - Significant Other   Primary Phone: [phone] (Mobile)                 Advance Directives  Does patient have a Health Care POA?: No  Was patient offered paperwork?: Yes (Patient given paperwork at bedside.)  Does patient currently have a Health Care decision maker?: Yes, please see Health Care Proxy section  Does patient have Advance Directives?: No  Was patient offered paperwork?: Yes (Patient given paperwork)  Primary Contact: [facility], S/O         Readmission Root Cause  30 Day Readmission: No    Patient Information  Admitted from:: Home  Mental Status: Alert  During Assessment patient was accompanied by: Spouse  Assessment information  provided by:: Patient, Spouse  Primary Caregiver: Self  Support Systems: Self, Spouse/significant other  County of Residence: [place]  What [facility] do you live in?: [place]  Home entry access options. Select all that apply.: Stairs  Number of steps to enter home.: One Flight  Do the steps have railings?: Yes  Type of Current Residence: Apartment  Floor Level: 2  Upon entering residence, is there a bedroom on the main floor (no further steps)?: Yes  Upon entering residence, is there a bathroom on the main floor (no further steps)?: Yes  Living Arrangements: Lives w/ Spouse/significant other  Is patient a ?: No    Activities of Daily Living Prior to Admission  Functional Status: Independent  Completes ADLs independently?: Yes  Ambulates independently?: Yes  Does patient use assisted devices?: No  Does patient currently own DME?: No  Does patient have a history of Outpatient Therapy (PT/OT)?: No  Does the patient have a history of Short-Term Rehab?: No  Does patient have a history of HHC?: No  Does patient currently have HHC?: No         Patient Information Continued  Income Source: Unemployed  Does patient have prescription coverage?: Yes  Does patient receive dialysis treatments?: No  Does patient have a history of substance abuse?: No  Does patient have a history of Mental Health Diagnosis?: No         Means of Transportation  Means of Transport to Appts:: Drives Self      Social Determinants of Health (SDOH)      Flowsheet Row Most Recent Value   Housing Stability    In the last 12 months, was there a time when you were not able to pay the mortgage or rent on time? N   In the past 12 months, how many times have you moved where you were living? 0   At any time in the past 12 months, were you homeless or living in a shelter (including now)? N   Transportation Needs    In the past 12 months, has lack of transportation kept you from medical appointments or from getting medications? no   In the past 12 months,  has lack of transportation kept you from meetings, work, or from getting things needed for daily living? No   Food Insecurity    Within the past 12 months, you worried that your food would run out before you got the money to buy more. Never true   Within the past 12 months, the food you bought just didn't last and you didn't have money to get more. Never true   Utilities    In the past 12 months has the electric, gas, oil, or water company threatened to shut off services in your home? No            DISCHARGE DETAILS:    Discharge planning discussed with:: Patient and S/O  Freedom of Choice: Yes  Comments - Freedom of Choice: CM discussed discharge planning and freedom of choice if services are recommended by [facility]. No CM needs anticipated.  CM contacted family/caregiver?: Yes  Were Treatment Team discharge recommendations reviewed with patient/caregiver?: Yes  Did patient/caregiver verbalize understanding of patient care needs?: Yes  Were patient/caregiver advised of the risks associated with not following Treatment Team discharge recommendations?: Yes    Contacts  Patient Contacts: [facility], S/O  Relationship to Patient:: Family  Contact Method: In Person  Reason/Outcome: Discharge Planning, Continuity of Care, Emergency Contact    Requested Home Health Care         Is the patient interested in HHC at discharge?: No    DME Referral Provided  Referral made for DME?: No    Other Referral/Resources/Interventions Provided:  Interventions: POLST  Referral Comments: POA documentation given    Would you like to participate in our [facility] service program?  : No - Declined    Treatment Team Recommendation: Home  Discharge Destination Plan:: Home  Transport at Discharge : Family

## 2025-02-21 NOTE — ASSESSMENT & PLAN NOTE
"Patient presented with claudication, leg cramps and pain outpatient  Has history of tobacco use close to 30 years  CTA abdominal runoff today shows \"Occlusion of the entirety of the left external iliac artery, with reconstitution present at the level of the left common femoral artery.  Right lower extremity: Moderate to severe diffuse stenosis present at the right common femoral artery. Multifocal moderate stenosis present at the proximal and mid right popliteal artery. Three-vessel runoff is present to the right foot.  Left lower extremity: Moderate to severe diffuse stenosis present at the left common femoral artery. Mild to moderate stenosis present at the proximal left SFA. Mild to moderate stenosis present at the left popliteal artery. Three-vessel runoff is present to the left foot.  Diffuse stenosis with focal occlusion present at the proximal celiac artery, with reconstitution present distally.    Vascular surgery consulted  Patient will need outpatient workup and surgery likely femoropopliteal bypass  Discharged with statin and Plavix  Discharged with pain control; oxycodone  Ambulatory referral placed for cardiology for cardiac clearance for surgery  "

## 2025-02-21 NOTE — DISCHARGE SUMMARY
"Discharge Summary - Hospitalist   Name: Erik Cordon 49 y.o. male I MRN: [id number]  Unit/Bed#: -01 I Date of Admission: 2/20/2025   Date of Service: 2/21/2025 I Hospital Day: 0     Assessment & Plan  Severe peripheral arterial disease (HCC)  Patient presented with claudication, leg cramps and pain outpatient  Has history of tobacco use close to 30 years  CTA abdominal runoff today shows \"Occlusion of the entirety of the left external iliac artery, with reconstitution present at the level of the left common femoral artery.  Right lower extremity: Moderate to severe diffuse stenosis present at the right common femoral artery. Multifocal moderate stenosis present at the proximal and mid right popliteal artery. Three-vessel runoff is present to the right foot.  Left lower extremity: Moderate to severe diffuse stenosis present at the left common femoral artery. Mild to moderate stenosis present at the proximal left SFA. Mild to moderate stenosis present at the left popliteal artery. Three-vessel runoff is present to the left foot.  Diffuse stenosis with focal occlusion present at the proximal celiac artery, with reconstitution present distally.    Vascular surgery consulted  Patient will need outpatient workup and surgery likely femoropopliteal bypass  Discharged with statin and Plavix  Discharged with pain control; oxycodone  Ambulatory referral placed for cardiology for cardiac clearance for surgery  GERD (gastroesophageal reflux disease)  Not currently on PPI  Nicotine dependence with current use  Nicotine patch in place  Tobacco cessation advised    Discharging Physician / Practitioner: BARB Villela  PCP: Erendira Falk DO  Admission Date:   Admission Orders (From admission, onward)       Ordered        02/20/25 2002  Place in Observation  Once                          Discharge Date: 02/21/25    Medical Problems       Resolved Problems  Date Reviewed: 2/7/2025   None   " "      Consultations During Hospital Stay:  IP CONSULT TO VASCULAR SURGERY    Procedures Performed:   CTA abdominal w run off w wo contrast  Result Date: 2/20/2025  Occlusion of the entirety of the left external iliac artery, with reconstitution present at the level of the left common femoral artery. Right lower extremity: Moderate to severe diffuse stenosis present at the right common femoral artery. Multifocal moderate stenosis present at the proximal and mid right popliteal artery. Three-vessel runoff is present to the right foot. Left lower extremity: Moderate to severe diffuse stenosis present at the left common femoral artery. Mild to moderate stenosis present at the proximal left SFA. Mild to moderate stenosis present at the left popliteal artery. Three-vessel runoff is present to the left foot. Diffuse stenosis with focal occlusion present at the proximal celiac artery, with reconstitution present distally. The study was marked in EPIC for significant notification. Workstation performed: [id number]     MRI lumbar spine without contrast  Result Date: 2/20/2025  No significant interval change since prior examination. Degenerative changes of the lumbar spine, as described above. Workstation performed: [id number]         Significant Findings / Test Results:   above    Incidental Findings:   See note     Test Results Pending at Discharge (will require follow up):        Outpatient Tests Requested:      Complications:      Past Medical History:   Diagnosis Date    Anxiety 2019    Chronic pain disorder     to back down to knees    GERD (gastroesophageal reflux disease)     Shortness of breath     with ambulation       Reason for Admission: Evaluation of Abnormal Diagnostic Test (Sent in for vascular surgery per  note \"he does have an occlusion of the left external iliac artery, \" + LLE pain, + smoker + sob )       Hospital Course:     Erik Cordon is a 49 y.o. male patient with past medical history of  " "has a past medical history of Anxiety, Chronic pain disorder, GERD (gastroesophageal reflux disease), and Shortness of breath. who originally presented to the hospital on 2/20/2025 due to Evaluation of Abnormal Diagnostic Test (Sent in for vascular surgery per dr note \"he does have an occlusion of the left external iliac artery, \" + LLE pain, + smoker + sob ) patient presented with significant lower extremity pain during activity consistent with intermittent claudication.  Evaluated by vascular surgery while here no inpatient intervention necessary at this time started on statin and Plavix will need close outpatient follow-up with vascular surgery.  Also will need cardiac clearance from cardiology ambulatory referral placed    Please see above list of diagnoses and related plan for additional information.     Condition at Discharge: stable     Discharge Day Visit / Exam:     Subjective: Patient complains of poor appetite and chronic abdominal pain that has been going on for several years reports that he is still having some lower extremity pain and cramping but does report minimal improvement      Vitals: Blood Pressure: 109/56 (02/21/25 0739)  Pulse: (!) 51 (02/21/25 0739)  Temperature: 98.4 °F (36.9 °C) (02/21/25 0739)  Temp Source: Oral (02/20/25 2107)  Respirations: 18 (02/21/25 0739)  Height: 5' 6\" (167.6 cm) (02/20/25 2106)  Weight - Scale: 51.6 kg (113 lb 12.1 oz) (Simultaneous filing. User may not have seen previous data.) (02/20/25 2106)  SpO2: 97 % (02/21/25 0739)  Exam:   Physical Exam  Constitutional:       General: He is not in acute distress.     Appearance: He is underweight. He is ill-appearing.   Cardiovascular:      Rate and Rhythm: Normal rate.   Pulmonary:      Effort: No respiratory distress.   Musculoskeletal:         General: Tenderness present. Normal range of motion.   Skin:     General: Skin is warm.   Neurological:      Mental Status: He is alert. Mental status is at baseline. "   Psychiatric:         Mood and Affect: Mood normal.       Discussion with Family: Wife at bedside    Discharge instructions/Information to patient and family:   See after visit summary for information provided to patient and family.      Provisions for Follow-Up Care:  See after visit summary for information related to follow-up care and any pertinent home health orders.      Disposition:     Home    Planned Readmission: no     Discharge Statement:  I spent 45 minutes discharging the patient. This time was spent on the day of discharge. I had direct contact with the patient on the day of discharge. Greater than 50% of the total time was spent examining patient, answering all patient questions, arranging and discussing plan of care with patient as well as directly providing post-discharge instructions.  Additional time then spent on discharge activities.    Discharge Medications:  See after visit summary for reconciled discharge medications provided to patient and family.      ** Please Note: This note has been constructed using a voice recognition system **

## 2025-02-21 NOTE — H&P
"H&P - Hospitalist   Name: Erik Cordon 49 y.o. male I MRN: [id number]  Unit/Bed#: -01 I Date of Admission: 2/20/2025   Date of Service: 2/20/2025 I Hospital Day: 0     Assessment & Plan  Severe peripheral arterial disease (HCC)  Patient presented with claudication, leg cramps and pain outpatient  Has history of tobacco use close to 30 years  CTA abdominal runoff today shows \"Occlusion of the entirety of the left external iliac artery, with reconstitution present at the level of the left common femoral artery.  Right lower extremity: Moderate to severe diffuse stenosis present at the right common femoral artery. Multifocal moderate stenosis present at the proximal and mid right popliteal artery. Three-vessel runoff is present to the right foot.  Left lower extremity: Moderate to severe diffuse stenosis present at the left common femoral artery. Mild to moderate stenosis present at the proximal left SFA. Mild to moderate stenosis present at the left popliteal artery. Three-vessel runoff is   present to the left foot.  Diffuse stenosis with focal occlusion present at the proximal celiac artery, with reconstitution present distally.    Discussed briefly with vascular surgery on-call, will see patient in a.m.  Holding off on heparin drip at this time  Pain control with morphine as needed severe pain  Continue with pregabalin nightly  Neurovascular checks   GERD (gastroesophageal reflux disease)  Not currently on PPI  Nicotine dependence with current use  Nicotine patch in place  Tobacco cessation advised      VTE Pharmacologic Prophylaxis:   Moderate Risk (Score 3-4) - Pharmacological DVT Prophylaxis Ordered: enoxaparin (Lovenox).  Code Status: Level 1 - Full Code   Discussion with family: Updated  (wife) at bedside.    Anticipated Length of Stay: Patient will be admitted on an observation basis with an anticipated length of stay of less than 2 midnights secondary to above.    History of " Present Illness   Chief Complaint: Abnormal arterial duplex results    Erik Cordon is a 49 y.o. male with a PMH of tobacco abuse, GERD presenting with complaints of abnormal CTA results. Patient has been having lower back pain for the past several months, was seen by the neurology who had ordered an MRI lumbar spine and arterial duplex. Patient had studies completed outpatient today.  Arterial duplex was abnormal and therefore neurologist called patient to report to the emergency room for further evaluation.  Patient states the pain began about 5 months ago, initially notable with walking long distances.  It is mainly in the calves and is worse on the left leg.  Due to the pain left leg he started to lean on the right and notes that the right leg is starting to hurt as much.  He also reports 'cold pain' in his feet bilaterally, patient's wife notes patient is always cold.  He denies any trauma to the legs, any open wounds, fevers.  He admits to smoking 5 cigarettes a day since he was a teenager.  Denies alcohol use.  Family history is significant for a maternal uncle with the same symptoms which were brought on by smoking as well.  He denies any chest pains, shortness of breath, palpitations, dizziness/lightheadedness, abdominal pain, nausea, vomiting, diarrhea.    Review of Systems  As per HPI    Historical Information   Past Medical History:   Diagnosis Date    Anxiety 2019    Chronic pain disorder     to back down to knees    GERD (gastroesophageal reflux disease)     Shortness of breath     with ambulation     Past Surgical History:   Procedure Laterality Date    LUMBAR EPIDURAL INJECTION      pain shot     Social History     Tobacco Use    Smoking status: Some Days     Current packs/day: 0.50     Average packs/day: 0.7 packs/day for 33.8 years (22.6 ttl pk-yrs)     Types: Cigarettes     Start date: 1/1/1997    Smokeless tobacco: Never   Vaping Use    Vaping status: Never Used   Substance and Sexual  Activity    Alcohol use: Not Currently     Comment: holidays    Drug use: Yes     Frequency: 7.0 times per week     Types: Marijuana     Comment: 4 times a day    Sexual activity: Yes     Partners: Female     E-Cigarette/Vaping    E-Cigarette Use Never User      E-Cigarette/Vaping Substances    Nicotine No     THC No     CBD No     Flavoring No     Other No     Unknown No      Family History   Problem Relation Age of Onset    No Known Problems Mother     No Known Problems Father     No Known Problems Sister     No Known Problems Brother     No Known Problems Maternal Grandmother     No Known Problems Maternal Grandfather     No Known Problems Paternal Grandmother     No Known Problems Paternal Grandfather     No Known Problems Maternal Aunt     No Known Problems Maternal Uncle     No Known Problems Paternal Aunt     No Known Problems Paternal Uncle     No Known Problems Cousin      Social History:  Marital Status: Registered Domestic Partner       Meds/Allergies   I have reviewed home medications with patient personally.  Prior to Admission medications    Medication Sig Start Date End Date Taking? Authorizing Provider   albuterol (Ventolin HFA) 90 mcg/act inhaler Inhale 2 puffs every 6 (six) hours as needed for wheezing 2/5/25   Sabino Maki MD   ibuprofen (MOTRIN) 600 mg tablet Take 1 tablet (600 mg total) by mouth every 6 (six) hours as needed for mild pain 1/15/25   Caterina Reid PA-C   nicotine (NICODERM CQ) 14 mg/24hr TD 24 hr patch Place 1 patch on the skin over 24 hours every 24 hours  Patient not taking: Reported on 2/7/2025 2/5/25   Sabino Maki MD   nicotine (NICODERM CQ) 21 mg/24 hr TD 24 hr patch Place 1 patch on the skin over 24 hours every 24 hours 2/5/25   Sabino Maki MD   nicotine (NICODERM CQ) 7 mg/24hr TD 24 hr patch Place 1 patch on the skin over 24 hours every 24 hours  Patient not taking: Reported on 2/7/2025 2/5/25   Sabino Maki MD   pregabalin (LYRICA)  25 mg capsule Take 1 capsule (25 mg total) by mouth daily at bedtime 2/7/25   Erendira Falk,      Allergies   Allergen Reactions    Pollen Extract Other (See Comments)     Watery eyes       Objective :  Temp:  [98 °F (36.7 °C)] 98 °F (36.7 °C)  HR:  [60-90] 70  BP: (100-131)/(57-81) 100/75  Resp:  [17-18] 18  SpO2:  [95 %-100 %] 98 %  O2 Device: None (Room air)    Physical Exam  Vitals and nursing note reviewed.   Constitutional:       General: He is not in acute distress.     Appearance: He is well-developed and underweight. He is ill-appearing. He is not toxic-appearing.   HENT:      Head: Normocephalic and atraumatic.      Mouth/Throat:      Mouth: Mucous membranes are moist.      Pharynx: No posterior oropharyngeal erythema.   Eyes:      General: No scleral icterus.     Conjunctiva/sclera: Conjunctivae normal.   Cardiovascular:      Rate and Rhythm: Normal rate and regular rhythm.      Pulses:           Dorsalis pedis pulses are 2+ on the right side and 1+ on the left side.      Heart sounds: No murmur heard.  Pulmonary:      Effort: Pulmonary effort is normal. No respiratory distress.      Breath sounds: Normal breath sounds. No wheezing.   Abdominal:      General: There is no distension.      Palpations: Abdomen is soft.      Tenderness: There is no abdominal tenderness.   Musculoskeletal:         General: No swelling.      Cervical back: Neck supple.      Right lower leg: No edema.      Left lower leg: No edema.      Left ankle: No swelling.      Right foot: No swelling.      Left foot: No swelling.   Skin:     General: Skin is warm and dry.      Capillary Refill: Capillary refill takes less than 2 seconds.      Findings: No rash.   Neurological:      Mental Status: He is alert and oriented to person, place, and time.      Cranial Nerves: No dysarthria.      Sensory: Sensation is intact. No sensory deficit.      Motor: No weakness.      Comments: Sensation intact in LE's bilaterally  Strength 5/5  in flexors/extensors of the foot, lower legs    Psychiatric:         Mood and Affect: Mood normal.         Lines/Drains:          Lab Results: I have reviewed the following results:  Results from last 7 days   Lab Units 02/20/25  1513   WBC Thousand/uL 5.58   HEMOGLOBIN g/dL 13.4   HEMATOCRIT % 42.4   PLATELETS Thousands/uL 160   LYMPHO PCT % 29   MONO PCT % 7   EOS PCT % 2     Results from last 7 days   Lab Units 02/20/25  1513   SODIUM mmol/L 135   POTASSIUM mmol/L 4.7   CHLORIDE mmol/L 105   CO2 mmol/L 24   BUN mg/dL 17   CREATININE mg/dL 1.06   ANION GAP mmol/L 6   CALCIUM mg/dL 9.3   ALBUMIN g/dL 4.3   TOTAL BILIRUBIN mg/dL 0.51   ALK PHOS U/L 83   ALT U/L 10   AST U/L 17   GLUCOSE RANDOM mg/dL 77     Results from last 7 days   Lab Units 02/20/25  1513   INR  0.89         Lab Results   Component Value Date    HGBA1C 4.8 02/22/2021           Imaging Results Review: I reviewed radiology reports from this admission including: CT abdomen/pelvis.  Other Study Results Review: EKG was reviewed.     Administrative Statements   I have spent a total time of 75 minutes in caring for this patient on the day of the visit/encounter including Instructions for management.    ** Please Note: This note has been constructed using a voice recognition system. **

## 2025-02-21 NOTE — UTILIZATION REVIEW
"Initial Clinical Review    Admission: Date/Time/Statement:   Admission Orders (From admission, onward)       Ordered        02/20/25 2002  Place in Observation  Once                          Orders Placed This Encounter   Procedures    Place in Observation     Standing Status:   Standing     Number of Occurrences:   1     Level of Care:   Med Surg [16]     ED Arrival Information       Expected   -    Arrival   2/20/2025 14:28    Acuity   Emergent              Means of arrival   Walk-In    Escorted by   Spouse    Service   Hospitalist    Admission type   Emergency              Arrival complaint   abnormal labs - needs stent per              Chief Complaint   Patient presents with    Evaluation of Abnormal Diagnostic Test     Sent in for vascular surgery per  note \"he does have an occlusion of the left external iliac artery, \" + LLE pain, + smoker + sob        Initial Presentation: 49 y.o. male to ED from home w/ PMHX GERD c/o  abnormal CTA results. Patient has been having lower back pain for the past several months, was seen by the neurology who had ordered an MRI lumbar spine and arterial duplex. Patient had studies completed outpatient today.  Arterial duplex was abnormal and therefore neurologist called patient to report to the emergency room for further evaluation.  Pain began 5 months  ago , mainly in calves worse on L . reports 'cold pain' in his feet bilaterally . CTA abdominal runoff today shows \"Occlusion of the entirety of the left external iliac artery, with reconstitution present at the level of the left common femoral artery.  Right lower extremity: Moderate to severe diffuse stenosis present at the right common femoral artery. Multifocal moderate stenosis present at the proximal and mid right popliteal artery. Three-vessel runoff is present to the right foot.  Left lower extremity: Moderate to severe diffuse stenosis present at the left common femoral artery. Mild to moderate stenosis present at the " proximal left SFA. Mild to moderate stenosis present at the left popliteal artery. Three-vessel runoff is   present to the left foot.  Diffuse stenosis with focal occlusion present at the proximal celiac artery, with reconstitution present distally.  Admitted OBS status w/ severe PAD . Plan pain control , vascular sx consult , neurovascular checks .     Anticipated Length of Stay/Certification Statement: Patient will be admitted on an observation basis with an anticipated length of stay of less than 2 midnights secondary to above.     2/21 Vascular surgery Consult   LEAD 2/20/2025:  >70% stenosis of the right proximal profunda, 50-75% stenosis of the distal right SFA, occlusion of the left mid-distal external iliac artery with collateral flow noted to the left CFA. Monophasic flow is noted throughout the left lower extremity distal to the occlusion.  R SHIREEN:  0.80/108/74, L SHIREEN: 0.49/62/30. Plan Left lower extremity short distance claudication, motor/sensory intact without signs of acute limb ischemia. CTA noting occlusion of entire left external iliac artery with distal reconstitution.  Recommend medical optimization with Plavix and statin therapy      ED Treatment-Medication Administration from 02/20/2025 1428 to 02/20/2025 2059         Date/Time Order Dose Route Action     02/20/2025 1642 acetaminophen (TYLENOL) tablet 650 mg 650 mg Oral Given     02/20/2025 1715 fentaNYL injection 50 mcg 50 mcg Intravenous Given     02/20/2025 1815 iohexol (OMNIPAQUE) 350 MG/ML injection (MULTI-DOSE) 100 mL 100 mL Intravenous Given            Scheduled Medications:  enoxaparin, 40 mg, Subcutaneous, Q24H NEIL  nicotine, 1 patch, Transdermal, Daily  pregabalin, 25 mg, Oral, HS      Continuous IV Infusions:     PRN Meds:  morphine injection, 4 mg, Intravenous, Q4H PRN  2/21 x1      ED Triage Vitals   Temperature Pulse Respirations Blood Pressure SpO2 Pain Score   02/20/25 1506 02/20/25 1506 02/20/25 1506 02/20/25 1506 02/20/25 1506  02/20/25 1642   98 °F (36.7 °C) 90 18 119/81 95 % 10 - Worst Possible Pain     Weight (last 2 days)       Date/Time Weight    02/20/25 21:06:16 51.6 (113.76)     Weight: Simultaneous filing. User may not have seen previous data. at 02/20/25 2106    02/20/25 1506 53.5 (118)            Vital Signs (last 3 days)       Date/Time Temp Pulse Resp BP MAP (mmHg) SpO2 O2 Device Patient Position - Orthostatic VS Pain    02/21/25 02:38:35 -- 60 -- 115/51 72 99 % -- -- --    02/21/25 0050 -- 60 -- 106/54 71 98 % -- -- --    02/21/25 0043 -- -- -- -- -- -- -- -- 8    02/21/25 00:42:55 -- 59 -- 106/54 71 98 % -- -- --    02/20/25 23:44:15 98.7 °F (37.1 °C) 66 18 108/55 73 99 % -- -- --    02/20/25 2115 -- -- -- -- -- -- None (Room air) -- --    02/20/25 2107 98 °F (36.7 °C) 70 -- 100/75 83 98 % -- -- --    02/20/25 21:06:16 98 °F (36.7 °C) -- 18 100/75 83 -- -- Sitting 8    02/20/25 1900 -- 60 18 113/57 80 100 % -- -- --    02/20/25 1830 -- 65 17 113/74 81 100 % None (Room air) Sitting --    02/20/25 1745 -- 63 17 130/65 90 99 % None (Room air) Sitting --    02/20/25 1715 -- -- -- -- -- -- -- -- 10 - Worst Possible Pain    02/20/25 1700 -- 62 17 131/69 94 100 % None (Room air) Sitting 10 - Worst Possible Pain    02/20/25 1645 -- 71 18 126/64 88 100 % None (Room air) Sitting 10 - Worst Possible Pain    02/20/25 1642 -- -- -- -- -- -- -- -- 10 - Worst Possible Pain    02/20/25 1506 98 °F (36.7 °C) 90 18 119/81 -- 95 % None (Room air) Sitting --              Pertinent Labs/Diagnostic Test Results:   Radiology:  CTA abdominal w run off w wo contrast   Final Interpretation by Eddy Ortiz MD (02/20 1903)   Occlusion of the entirety of the left external iliac artery, with reconstitution present at the level of the left common femoral artery.      Right lower extremity: Moderate to severe diffuse stenosis present at the right common femoral artery. Multifocal moderate stenosis present at the proximal and mid right popliteal  artery. Three-vessel runoff is present to the right foot.      Left lower extremity: Moderate to severe diffuse stenosis present at the left common femoral artery. Mild to moderate stenosis present at the proximal left SFA. Mild to moderate stenosis present at the left popliteal artery. Three-vessel runoff is    present to the left foot.      Diffuse stenosis with focal occlusion present at the proximal celiac artery, with reconstitution present distally.      The study was marked in EPIC for significant notification.      Workstation performed: [id number]           Cardiology:  ECG 12 lead   Final Result by Andree Bonilla MD (02/20 1728)   Normal sinus rhythm   Septal infarct , age undetermined   Abnormal ECG   No previous ECGs available   Confirmed by Andree Bonilla ([id number]) on 2/20/2025 5:28:51 PM        GI:  No orders to display           Results from last 7 days   Lab Units 02/20/25  1513   WBC Thousand/uL 5.58   HEMOGLOBIN g/dL 13.4   HEMATOCRIT % 42.4   PLATELETS Thousands/uL 160       Results from last 7 days   Lab Units 02/20/25  1513   SODIUM mmol/L 135   POTASSIUM mmol/L 4.7   CHLORIDE mmol/L 105   CO2 mmol/L 24   ANION GAP mmol/L 6   BUN mg/dL 17   CREATININE mg/dL 1.06   EGFR ml/min/1.73sq m 83   CALCIUM mg/dL 9.3     Results from last 7 days   Lab Units 02/20/25  1513   AST U/L 17   ALT U/L 10   ALK PHOS U/L 83   TOTAL PROTEIN g/dL 8.1   ALBUMIN g/dL 4.3   TOTAL BILIRUBIN mg/dL 0.51         Results from last 7 days   Lab Units 02/20/25  1513   GLUCOSE RANDOM mg/dL 77       Results from last 7 days   Lab Units 02/21/25  0315 02/21/25  0110 02/20/25  2240 02/20/25  1727 02/20/25  1513   HS TNI 0HR ng/L  --   --  42  --  30   HS TNI 2HR ng/L  --  45  --  38  --    HSTNI D2 ng/L  --  3  --  8  --    HS TNI 4HR ng/L 45  --   --   --   --    HSTNI D4 ng/L 3  --   --   --   --        Results from last 7 days   Lab Units 02/20/25  1513   PROTIME seconds 12.7   INR  0.89   PTT seconds 28         Past Medical  History:   Diagnosis Date    Anxiety 2019    Chronic pain disorder     to back down to knees    GERD (gastroesophageal reflux disease)     Shortness of breath     with ambulation     Present on Admission:   Nicotine dependence with current use   GERD (gastroesophageal reflux disease)      Admitting Diagnosis: Abnormal laboratory test [R89.9]  Claudication in peripheral vascular disease (HCC) [I73.9]  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 male    Network Utilization Review Department  ATTENTION: Please call with any questions or concerns to [phone] and carefully listen to the prompts so that you are directed to the right person. All voicemails are confidential.   For Discharge needs, contact Care Management DC Support Team at [phone] opt. 2  Send all requests for admission clinical reviews, approved or denied determinations and any other requests to dedicated fax number below belonging to the campus where the patient is receiving treatment. List of dedicated fax numbers for the Facilities:  FACILITY NAME UR FAX NUMBER   ADMISSION DENIALS (Administrative/Medical Necessity) [phone]   DISCHARGE SUPPORT TEAM (NETWORK) [phone]   PARENT CHILD HEALTH (Maternity/NICU/Pediatrics) [phone]   [facility] [phone]   [facility] [phone]   [facility] [phone]   [facility] [phone]   [facility] [phone]   [facility] [phone]   [facility] [phone]   [facility] [phone]   [facility] [phone]   [facility] [phone]   [facility] [phone]   [facility] [phone]

## 2025-02-21 NOTE — CONSULTS
Consultation - Vascular Surgery   Name: Erik Cordon 49 y.o. male I MRN: [id number]  Unit/Bed#: -01 I Date of Admission: 2/20/2025   Date of Service: 2/21/2025 I Hospital Day: 0   Inpatient consult to Vascular Surgery  Consult performed by: BARB Graves  Consult ordered by: Kenisha Alex MD        Physician Requesting Evaluation: Janes Olmedo MD   Reason for Evaluation / Principal Problem: left lower extremity pain    Assessment & Plan  Severe peripheral arterial disease (HCC)  49-year-old male, current smoker, with PMH lumbar spinal stenosis.  Patient presented to the [facility] emergency department on 2/20/2025 due to persistent severe bilateral lower extremity pain, L>R.  An arterial duplex completed earlier that day indicated complete left external iliac artery occlusion with SHIREEN and ischemic range.  Vascular surgery was consulted for evaluation.      Imaging:  LEAD 2/20/2025:  >70% stenosis of the right proximal profunda, 50-75% stenosis of the distal right SFA, occlusion of the left mid-distal external iliac artery with collateral flow noted to the left CFA. Monophasic flow is noted throughout the left lower extremity distal to the occlusion.  R SHIREEN:  0.80/108/74, L SHIREEN: 0.49/62/30  CTA abdomen with runoff 2/20/2025:  Occlusion of the entirety of the left external iliac artery, with reconstitution present at the level of the left common femoral artery. Moderate to severe diffuse stenosis present at the bilateral common femoral artery. Multifocal moderate stenosis present at the proximal/mid right popliteal artery, left proximal SFA, and left popliteal artery. Three-vessel runoff is present to bilateral feet Diffuse stenosis with focal occlusion present at the proximal celiac artery, with reconstitution present distally.    Plan:  -Left lower extremity short distance claudication, motor/sensory intact without signs of acute limb ischemia  -CTA noting occlusion of entire left  "external iliac artery with distal reconstitution.  -Recommend medical optimization with Plavix and statin therapy  -Patient will likely require revascularization, however in the absence of tissue loss or rest pain this can be arranged with short interval outpatient follow up with a vascular surgeon  -Pain management per primary team  -D/w Dr. Griffin  GERD (gastroesophageal reflux disease)    Nicotine dependence with current use    I have discussed the above management plan in detail with the primary service.   Vascular Surgery service will follow.  Please contact the SecureChat role for the Vascular Surgery service with any questions/concerns.    History of Present Illness   Erik Cordon is a 49 y.o. male, current smoker, with PMH lumbar spinal stenosis.  Patient presented to the [facility] emergency department on 2/20/2025 due to persistent severe bilateral lower extremity pain, L>R.  An arterial duplex completed earlier that day indicated complete left external iliac artery occlusion with SHIREEN and ischemic range.  Vascular surgery was consulted for evaluation.      Patient was sent for lower extremity arterial duplex at the request of his neurologist due to suspicion for vascular claudication after patient did not experience any improvement in his lower extremity pain with epidural steroid injection. Patient reports progressively worsening left lower extremity pain over the last 2 years. He reports that at this time, he is only able to walk 1 block before he starts experiencing left calf and thigh cramping. He can barely ambulate up 1 flight of stairs and has difficulty even walking through the grocery store. Patient reports that if he pushes himself to walk longer distances, he will intermittently experience numbness to the left foot. Patient denies rest pain, however notes frequent cold sensation to the left foot at night. Patient's wife endorses that patient's foot feel \"ice cold\" to touch at night. He " denies any episodes of tissue loss, however does note thickening of toe nail to the left great toe.      Review of Systems   Constitutional:  Positive for activity change. Negative for appetite change and unexpected weight change.   Gastrointestinal:  Positive for abdominal pain (intermittent). Negative for nausea and vomiting.   Musculoskeletal:  Positive for gait problem.   Skin:  Negative for color change, pallor and wound.   Neurological:  Positive for numbness.     Medical History Review: I have reviewed the patient's PMH, PSH, Social History, Family History, Meds, and Allergies     Objective :  Temp:  [98 °F (36.7 °C)-98.7 °F (37.1 °C)] 98.4 °F (36.9 °C)  HR:  [51-90] 51  BP: (100-131)/(51-81) 109/56  Resp:  [17-18] 18  SpO2:  [95 %-100 %] 97 %  O2 Device: None (Room air)    I/O         02/19 0701  02/20 0700 02/20 0701  02/21 0700 02/21 0701  02/22 0700    Urine (mL/kg/hr)  0     Total Output  0     Net  0            Unmeasured Urine Occurrence  0 x             Physical Exam  Vitals and nursing note reviewed.   Constitutional:       General: He is not in acute distress.     Appearance: Normal appearance. He is well-developed.   HENT:      Head: Normocephalic and atraumatic.   Eyes:      Conjunctiva/sclera: Conjunctivae normal.   Cardiovascular:      Rate and Rhythm: Normal rate and regular rhythm.      Pulses:           Femoral pulses are 2+ on the right side and 0 on the left side.       Dorsalis pedis pulses are 2+ on the right side and detected w/ Doppler on the left side.        Posterior tibial pulses are 1+ on the right side and detected w/ Doppler on the left side.      Heart sounds: Normal heart sounds. No murmur heard.  Pulmonary:      Effort: Pulmonary effort is normal. No respiratory distress.      Breath sounds: Normal breath sounds.   Abdominal:      General: There is no distension.      Palpations: Abdomen is soft.      Tenderness: There is no abdominal tenderness.   Musculoskeletal:          General: No swelling.      Cervical back: Normal range of motion and neck supple.   Skin:     General: Skin is warm and dry.      Capillary Refill: Capillary refill takes 2 to 3 seconds.      Findings: No lesion, rash or wound.   Neurological:      General: No focal deficit present.      Mental Status: He is alert and oriented to person, place, and time.   Psychiatric:         Mood and Affect: Mood normal.         Behavior: Behavior normal.            Lab Results: I have reviewed the following results:  Recent Labs     02/20/25  1513 02/20/25  1727 02/20/25  2240 02/21/25  0110   WBC 5.58  --   --   --    HGB 13.4  --   --   --    HCT 42.4  --   --   --      --   --   --    SODIUM 135  --   --   --    K 4.7  --   --   --      --   --   --    CO2 24  --   --   --    BUN 17  --   --   --    CREATININE 1.06  --   --   --    GLUC 77  --   --   --    AST 17  --   --   --    ALT 10  --   --   --    ALB 4.3  --   --   --    TBILI 0.51  --   --   --    ALKPHOS 83  --   --   --    PTT 28  --   --   --    INR 0.89  --   --   --    HSTNI0 30  --  42  --    HSTNI2  --    < >  --  45    < > = values in this interval not displayed.       Imaging Results Review: I reviewed radiology reports from this admission including: LEAD and CTA abdomen with runoff.

## 2025-02-21 NOTE — PLAN OF CARE
Problem: PAIN - ADULT  Goal: Verbalizes/displays adequate comfort level or baseline comfort level  Description: Interventions:  - Encourage patient to monitor pain and request assistance  - Assess pain using appropriate pain scale  - Administer analgesics based on type and severity of pain and evaluate response  - Implement non-pharmacological measures as appropriate and evaluate response  - Consider cultural and social influences on pain and pain management  - Notify physician/advanced practitioner if interventions unsuccessful or patient reports new pain  Outcome: Progressing       Problem: HEMATOLOGIC - ADULT  Goal: Maintains hematologic stability  Description: INTERVENTIONS  - Assess for signs and symptoms of bleeding or hemorrhage  - Monitor labs  - Administer supportive blood products/factors as ordered and appropriate  Outcome: Progressing     Problem: MUSCULOSKELETAL - ADULT  Goal: Maintain or return mobility to safest level of function  Description: INTERVENTIONS:  - Assess patient's ability to carry out ADLs; assess patient's baseline for ADL function and identify physical deficits which impact ability to perform ADLs (bathing, care of mouth/teeth, toileting, grooming, dressing, etc.)  - Assess/evaluate cause of self-care deficits   - Assess range of motion  - Assess patient's mobility  - Assess patient's need for assistive devices and provide as appropriate  - Encourage maximum independence but intervene and supervise when necessary  - Involve family in performance of ADLs  - Assess for home care needs following discharge   - Consider OT consult to assist with ADL evaluation and planning for discharge  - Provide patient education as appropriate  Outcome: Progressing     Problem: Knowledge Deficit  Goal: Patient/family/caregiver demonstrates understanding of disease process, treatment plan, medications, and discharge instructions  Description: Complete learning assessment and assess knowledge  base.  Interventions:  - Provide teaching at level of understanding  - Provide teaching via preferred learning methods  Outcome: Progressing     Problem: DISCHARGE PLANNING  Goal: Discharge to home or other facility with appropriate resources  Description: INTERVENTIONS:  - Identify barriers to discharge w/patient and caregiver  - Arrange for needed discharge resources and transportation as appropriate  - Identify discharge learning needs (meds, wound care, etc.)  - Arrange for interpretive services to assist at discharge as needed  - Refer to Case Management Department for coordinating discharge planning if the patient needs post-hospital services based on physician/advanced practitioner order or complex needs related to functional status, cognitive ability, or social support system  Outcome: Progressing

## 2025-02-21 NOTE — INCIDENTAL FINDINGS
The following findings require follow up:  Radiographic finding   Finding: PAD w/ external iliac occlusion   Follow up required: vascular surgery       Please notify the following clinician to assist with the follow up:   Dr. Falk    Incidental finding results were discussed with the Patient by Kenisha Alex MD on 02/20/25.   They expressed understanding and all questions answered.

## 2025-02-21 NOTE — ASSESSMENT & PLAN NOTE
"Patient presented with claudication, leg cramps and pain outpatient  Has history of tobacco use close to 30 years  CTA abdominal runoff today shows \"Occlusion of the entirety of the left external iliac artery, with reconstitution present at the level of the left common femoral artery.  Right lower extremity: Moderate to severe diffuse stenosis present at the right common femoral artery. Multifocal moderate stenosis present at the proximal and mid right popliteal artery. Three-vessel runoff is present to the right foot.  Left lower extremity: Moderate to severe diffuse stenosis present at the left common femoral artery. Mild to moderate stenosis present at the proximal left SFA. Mild to moderate stenosis present at the left popliteal artery. Three-vessel runoff is   present to the left foot.  Diffuse stenosis with focal occlusion present at the proximal celiac artery, with reconstitution present distally.    Discussed briefly with vascular surgery on-call, will see patient in a.m.  Holding off on heparin drip at this time  Pain control with morphine as needed severe pain  Continue with pregabalin nightly  Neurovascular checks   "

## 2025-02-21 NOTE — TELEPHONE ENCOUNTER
Received VIA mail, from [facility].... paperwork asking for patients medical records........ will fax to [facility] .......

## 2025-02-21 NOTE — PLAN OF CARE
Problem: PAIN - ADULT  Goal: Verbalizes/displays adequate comfort level or baseline comfort level  Description: Interventions:  - Encourage patient to monitor pain and request assistance  - Assess pain using appropriate pain scale  - Administer analgesics based on type and severity of pain and evaluate response  - Implement non-pharmacological measures as appropriate and evaluate response  - Consider cultural and social influences on pain and pain management  - Notify physician/advanced practitioner if interventions unsuccessful or patient reports new pain  Outcome: Progressing     Problem: SAFETY ADULT  Goal: Patient will remain free of falls  Description: INTERVENTIONS:  - Educate patient/family on patient safety including physical limitations  - Instruct patient to call for assistance with activity   - Consult OT/PT to assist with strengthening/mobility   - Keep Call bell within reach  - Keep bed low and locked with side rails adjusted as appropriate  - Keep care items and personal belongings within reach  - Initiate and maintain comfort rounds  - Make Fall Risk Sign visible to staff  - Offer Toileting every 2 Hours, in advance of need  - Initiate/Maintain alarm  - Obtain necessary fall risk management equipment  - Apply yellow socks and bracelet for high fall risk patients  - Consider moving patient to room near nurses station  Outcome: Progressing  Goal: Maintain or return to baseline ADL function  Description: INTERVENTIONS:  -  Assess patient's ability to carry out ADLs; assess patient's baseline for ADL function and identify physical deficits which impact ability to perform ADLs (bathing, care of mouth/teeth, toileting, grooming, dressing, etc.)  - Assess/evaluate cause of self-care deficits   - Assess range of motion  - Assess patient's mobility; develop plan if impaired  - Assess patient's need for assistive devices and provide as appropriate  - Encourage maximum independence but intervene and supervise  when necessary  - Involve family in performance of ADLs  - Assess for home care needs following discharge   - Consider OT consult to assist with ADL evaluation and planning for discharge  - Provide patient education as appropriate  Outcome: Progressing  Goal: Maintains/Returns to pre admission functional level  Description: INTERVENTIONS:  - Perform AM-PAC 6 Click Basic Mobility/ Daily Activity assessment daily.  - Set and communicate daily mobility goal to care team and patient/family/caregiver.   - Collaborate with rehabilitation services on mobility goals if consulted  - Perform Range of Motion 3 times a day.  - Reposition patient every 3 hours.  - Dangle patient 3 times a day  - Stand patient 3 times a day  - Ambulate patient 3 times a day  - Out of bed to chair 3 times a day   - Out of bed for meals 3 times a day  - Out of bed for toileting  - Record patient progress and toleration of activity level   Outcome: Progressing     Problem: DISCHARGE PLANNING  Goal: Discharge to home or other facility with appropriate resources  Description: INTERVENTIONS:  - Identify barriers to discharge w/patient and caregiver  - Arrange for needed discharge resources and transportation as appropriate  - Identify discharge learning needs (meds, wound care, etc.)  - Arrange for interpretive services to assist at discharge as needed  - Refer to Case Management Department for coordinating discharge planning if the patient needs post-hospital services based on physician/advanced practitioner order or complex needs related to functional status, cognitive ability, or social support system  Outcome: Progressing     Problem: Knowledge Deficit  Goal: Patient/family/caregiver demonstrates understanding of disease process, treatment plan, medications, and discharge instructions  Description: Complete learning assessment and assess knowledge base.  Interventions:  - Provide teaching at level of understanding  - Provide teaching via preferred  learning methods  Outcome: Progressing     Problem: HEMATOLOGIC - ADULT  Goal: Maintains hematologic stability  Description: INTERVENTIONS  - Assess for signs and symptoms of bleeding or hemorrhage  - Monitor labs  - Administer supportive blood products/factors as ordered and appropriate  Outcome: Progressing     Problem: MUSCULOSKELETAL - ADULT  Goal: Maintain or return mobility to safest level of function  Description: INTERVENTIONS:  - Assess patient's ability to carry out ADLs; assess patient's baseline for ADL function and identify physical deficits which impact ability to perform ADLs (bathing, care of mouth/teeth, toileting, grooming, dressing, etc.)  - Assess/evaluate cause of self-care deficits   - Assess range of motion  - Assess patient's mobility  - Assess patient's need for assistive devices and provide as appropriate  - Encourage maximum independence but intervene and supervise when necessary  - Involve family in performance of ADLs  - Assess for home care needs following discharge   - Consider OT consult to assist with ADL evaluation and planning for discharge  - Provide patient education as appropriate  Outcome: Progressing  Goal: Maintain proper alignment of affected body part  Description: INTERVENTIONS:  - Support, maintain and protect limb and body alignment  - Provide patient/ family with appropriate education  Outcome: Progressing

## 2025-02-24 ENCOUNTER — TRANSITIONAL CARE MANAGEMENT (OUTPATIENT)
Age: 50
End: 2025-02-24

## 2025-02-24 ENCOUNTER — TELEPHONE (OUTPATIENT)
Age: 50
End: 2025-02-24

## 2025-02-24 NOTE — TELEPHONE ENCOUNTER
Patient looking for Boost- to gain weight before surgery-   This should go through [facility] - DME because of [employer] insurance. He needs something that is good for lactose intolerance.     I also  told spouse that we have coupons if insurance does not cover .,

## 2025-03-03 ENCOUNTER — CONSULT (OUTPATIENT)
Dept: VASCULAR SURGERY | Facility: CLINIC | Age: 50
End: 2025-03-03
Payer: COMMERCIAL

## 2025-03-03 ENCOUNTER — TELEPHONE (OUTPATIENT)
Age: 50
End: 2025-03-03

## 2025-03-03 ENCOUNTER — TELEPHONE (OUTPATIENT)
Dept: VASCULAR SURGERY | Facility: CLINIC | Age: 50
End: 2025-03-03

## 2025-03-03 VITALS
OXYGEN SATURATION: 97 % | BODY MASS INDEX: 18 KG/M2 | WEIGHT: 112 LBS | HEIGHT: 66 IN | SYSTOLIC BLOOD PRESSURE: 108 MMHG | HEART RATE: 82 BPM | DIASTOLIC BLOOD PRESSURE: 72 MMHG

## 2025-03-03 DIAGNOSIS — I73.9 SEVERE PERIPHERAL ARTERIAL DISEASE (HCC): ICD-10-CM

## 2025-03-03 PROCEDURE — 99215 OFFICE O/P EST HI 40 MIN: CPT | Performed by: STUDENT IN AN ORGANIZED HEALTH CARE EDUCATION/TRAINING PROGRAM

## 2025-03-03 RX ORDER — CHLORHEXIDINE GLUCONATE ORAL RINSE 1.2 MG/ML
15 SOLUTION DENTAL ONCE
OUTPATIENT
Start: 2025-03-03 | End: 2025-03-03

## 2025-03-03 RX ORDER — CEFAZOLIN SODIUM 2 G/50ML
2000 SOLUTION INTRAVENOUS ONCE
OUTPATIENT
Start: 2025-03-03 | End: 2025-03-03

## 2025-03-03 NOTE — TELEPHONE ENCOUNTER
REMINDER: Under Reason For Call, comments MUST be formatted as:   (Surgeon's Initials) / (Procedure)      Special Instructions / FYI: E-CONSENT SIGNED    Clearances: N/A    Consent: I certify that patient has signed, printed, timed, and dated their surgery consent.  I certify that the patient's LEGAL NAME and DATE OF BIRTH are written in the upper left corner on BOTH sides of the consent.  I certify that BOTH sides of the completed surgery consent have been scanned into the patient's Epic chart by myself on 3/3/2025.  Yes, I have LABELED the consent in Epic as Consent for Vascular Procedure.     For Surgical Clearances     Levels   1-3   ROUTE this encounter to [facility] Surgery Coordinator Pool     Level   4   ROUTE this encounter to [facility] Surgery Coordinator Pool       HYDRATION CLEARANCES   ONLY ROUTE TO  [facility] Surgery Coordinator Pool       Yes, I have ROUTED this encounter to [facility] Surgery Coordinator.

## 2025-03-03 NOTE — LETTER
March 3, 2025     Patient: Erik Cordon  YOB: 1975  Date of Visit: 3/3/2025      To Whom it May Concern:    Erik Cordon is under my professional care. Erik was seen in my office on 3/3/2025. Erik is planned to undergo a major surgical procedure for his left iliac artery occlusion. A date for surgery is anticipated to be scheduled in the near future.     If you have any questions or concerns, please don't hesitate to call.         Sincerely,          Willie Griffin MD        CC: No Recipients

## 2025-03-03 NOTE — PROGRESS NOTES
Vascular Surgery Return Patient Visit  Date: 03/03/25      Assessment:  Erik Cordon is a 49 y.o. 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  We reviewed the natural history of arterial occlusive disease in the context of rest pain. We discussed the indications, methods (open and endovascular), and risk/benefit profile for each repair option.  Discussion of risks included but were not limited to bleeding/possible emergent return to the operating room, infection of prosthetic material which could require additional and more extensive surgery for graft explantation and arterial reconstruction should that occur, ureteral injury, possibility of erectile dysfunction/retrograde ejaculation due to injury of associated nerves within the field of dissection, graft stenosis/occlusion and associated procedures to maintain or restore patency. In consideration of the above, I recommended that he undergo left iliofemoral bypass with possible L IIA endarterectomy, possible L CFA endarterectomy. Our discussion was in the presence of the patient and his significant other ([employer]). All questions were answered to their satisfaction, they acknowledged understanding of the plan/risks/benefits, and Mr. Cordon wished to proceed with surgery.       Plan:  - Left iliofemoral bypass with possible L IIA endarterectomy, possible L CFA endarterectomy  - Consent obtained during today's visit    Diagnoses and all orders for this visit:    Severe peripheral arterial disease (HCC)  -     Ambulatory Referral to Vascular Surgery  -     Case request operating room: LEFT ILIAC TO COMMON FEMORAL ARTERY BYPASS; Standing  -     Basic metabolic panel; Future  -     CBC and Platelet; Future  -     Case request operating room: LEFT ILIAC TO COMMON FEMORAL ARTERY BYPASS    Other orders  -     Diet NPO; Sips with meds; Standing  -     Void on call to OR; Standing  -     Insert peripheral IV; Standing  -     Nursing  Communication CHG bath, have staff wash entire body (neck down) per pre op bathing protocol. Routine, evening prior to, and day of surgery.; Standing  -     Nursing Communication Swab both nares with Povidone-Iodine solution, EXCLUDE if patient has shellfish/Iodine allergy, and replace with nasal alcohol swabstick. Routine, day of surgery, on call to OR.; Standing  -     chlorhexidine (PERIDEX) 0.12 % oral rinse 15 mL  -     Place sequential compression device; Standing  -     ceFAZolin (ANCEF) IVPB (premix in dextrose) 2,000 mg 50 mL       Operative Scheduling Information:    Hospital:  [facility] (only [place], no other location, does not require hybrid)    Physician:  Me    Surgery: Left iliofemoral bypass with possible L IIA endarterectomy, possible L CFA endarterectomy    Urgency:  Standard    Level:  Level 3: Outpatients to be scheduled for elective surgery than can be delayed up to 4 weeks without reasonable expectation of detriment to patient    Case Length:  Normal    Post-op Bed:  ICU    OR Table:  Standard    Equipment Needs:  None    Medication Instructions:  None    Hydration:  No    Contrast Allergy:  no      Subjective:     HPI:  Erik Cordon is a 49 y.o. male with PMH of tobacco use (4-6 cigarettes/day).  He presents to the office for follow-up regarding his left lower extremity rest pain.  He has had claudication symptoms in his left leg for the last 1-2 years as best he is able to remember.  However over the last several weeks his symptoms have progressed to pain at rest.  Pain seems most pronounced in the thigh but he also complains of discomfort in his leg below the knee.  He reports being able to walk about 10 feet before the pain is so intense that he feels like he needs to stop.  He does not have a history of wounds on the left foot.    SHIREEN R 0.8, TP 74; L 0.49, TP 30    CTA demonstrates patent L EZEQUIEL but complete occlusion of the left EIA with reconstitution at about the level of  "the inguinal ligament. The L IIA is patent, but the ostium appears stenotic. L CFA w/ moderate amount of occlusive disease at the bifurcation. R EZEQUIEL patent, L EIA w/ focal proximal stenosis but patent, R IIA is chronically occluded.       Objective:    ROS:  All systems were reviewed and are negative except those mentioned in HPI and below.      Vitals: /72 (BP Location: Right arm, Patient Position: Sitting, Cuff Size: Standard)   Pulse 82   Ht 5' 6\" (1.676 m)   Wt 50.8 kg (112 lb)   SpO2 97%   BMI 18.08 kg/m²      General: male appears stated age, no apparent distress, alert and oriented   HEENT: normocephalic, atraumatic   Cardiovascular: hemodynamically stable   Chest/Lungs: no increased work of breathing, chest rise equal bilaterally   Abdomen: Soft, ND, NT, no pulsatile masses   Extremities: Nonpalpable left femoral, DP, PT pulse    Palpable right femoral, DP, PT pulse   Skin: warm and dry   Neuro: no gross deficits      Medications:  Current Outpatient Medications   Medication Sig Dispense Refill    albuterol (Ventolin HFA) 90 mcg/act inhaler Inhale 2 puffs every 6 (six) hours as needed for wheezing 18 g 5    atorvastatin (LIPITOR) 20 mg tablet Take 1 tablet (20 mg total) by mouth daily with dinner 30 tablet 0    clopidogrel (PLAVIX) 75 mg tablet Take 1 tablet (75 mg total) by mouth daily Do not start before February 22, 2025. 30 tablet 0    ibuprofen (MOTRIN) 600 mg tablet Take 1 tablet (600 mg total) by mouth every 6 (six) hours as needed for mild pain 30 tablet 0    nicotine (NICODERM CQ) 7 mg/24hr TD 24 hr patch Place 1 patch on the skin over 24 hours every 24 hours 14 patch 0    oxyCODONE (Roxicodone) 5 immediate release tablet Take 1 tablet (5 mg total) by mouth every 4 (four) hours as needed for moderate pain for up to 10 days Max Daily Amount: 30 mg 5 tablet 0    pregabalin (LYRICA) 25 mg capsule Take 1 capsule (25 mg total) by mouth daily at bedtime 14 capsule 0     No current " facility-administered medications for this visit.       Allergies:  Allergies   Allergen Reactions    Pollen Extract Other (See Comments)     Watery eyes        PMH:  Past Medical History:   Diagnosis Date    Anxiety 2019    Chronic pain disorder     to back down to knees    GERD (gastroesophageal reflux disease)     Shortness of breath     with ambulation        PSH:  Past Surgical History:   Procedure Laterality Date    LUMBAR EPIDURAL INJECTION      pain shot        FHx:  Family History   Problem Relation Age of Onset    No Known Problems Mother     No Known Problems Father     No Known Problems Sister     No Known Problems Brother     No Known Problems Maternal Grandmother     No Known Problems Maternal Grandfather     No Known Problems Paternal Grandmother     No Known Problems Paternal Grandfather     No Known Problems Maternal Aunt     No Known Problems Maternal Uncle     No Known Problems Paternal Aunt     No Known Problems Paternal Uncle     No Known Problems Cousin         SHx:  Social History     Socioeconomic History    Marital status: Registered Domestic Partner     Spouse name: Not on file    Number of children: Not on file    Years of education: Not on file    Highest education level: Not on file   Occupational History    Not on file   Tobacco Use    Smoking status: Some Days     Current packs/day: 0.50     Average packs/day: 0.7 packs/day for 33.8 years (22.6 ttl pk-yrs)     Types: Cigarettes     Start date: 1/1/1997    Smokeless tobacco: Never   Vaping Use    Vaping status: Never Used   Substance and Sexual Activity    Alcohol use: Not Currently     Comment: holidays    Drug use: Yes     Frequency: 7.0 times per week     Types: Marijuana     Comment: 4 times a day    Sexual activity: Yes     Partners: Female   Other Topics Concern    Not on file   Social History Narrative    Not on file     Social Drivers of Health     Financial Resource Strain: Not on file   Food Insecurity: No Food Insecurity  (2/21/2025)    Hunger Vital Sign     Worried About Running Out of Food in the Last Year: Never true     Ran Out of Food in the Last Year: Never true   Transportation Needs: No Transportation Needs (2/21/2025)    PRAPARE - Transportation     Lack of Transportation (Medical): No     Lack of Transportation (Non-Medical): No   Physical Activity: Not on file   Stress: Not on file   Social Connections: Unknown (6/18/2024)    Received from [facility]    Social Connections     How often do you feel lonely or isolated from those around you? (Adult - for ages 18 years and over): Not on file   Intimate Partner Violence: Not on file   Housing Stability: Low Risk  (2/21/2025)    Housing Stability Vital Sign     Unable to Pay for Housing in the Last Year: No     Number of Times Moved in the Last Year: 0     Homeless in the Last Year: No

## 2025-03-03 NOTE — TELEPHONE ENCOUNTER
Received medical record request by mail for [employer]; [employer]  Medical Records from Dr Falk and Dr Megan Michaels   Says: Please expedite request.     This will be Faxed to [facility] today.  F#: [phone]

## 2025-03-04 ENCOUNTER — PREP FOR PROCEDURE (OUTPATIENT)
Dept: VASCULAR SURGERY | Facility: CLINIC | Age: 50
End: 2025-03-04

## 2025-03-04 ENCOUNTER — TELEPHONE (OUTPATIENT)
Dept: VASCULAR SURGERY | Facility: CLINIC | Age: 50
End: 2025-03-04

## 2025-03-04 ENCOUNTER — TELEPHONE (OUTPATIENT)
Age: 50
End: 2025-03-04

## 2025-03-04 DIAGNOSIS — I73.9 SEVERE PERIPHERAL ARTERIAL DISEASE (HCC): Primary | ICD-10-CM

## 2025-03-04 RX ORDER — OXYCODONE HYDROCHLORIDE 5 MG/1
5 TABLET ORAL EVERY 6 HOURS PRN
Qty: 12 TABLET | Refills: 0 | Status: SHIPPED | OUTPATIENT
Start: 2025-03-04 | End: 2025-03-07

## 2025-03-04 NOTE — TELEPHONE ENCOUNTER
Reviewed. Patient follows with pain management as outpatient. Recommend that he reach out to their office to see if they will refill his pain medication.

## 2025-03-04 NOTE — TELEPHONE ENCOUNTER
Reviewed chart. Pain script sent to pharmacy on file. He should use prescription pain medication sparingly and utilize for severe pain only. He may utilize tylenol for mild-moderate pain as needed. If his pain is not controlled with pain medication, he should go to the ER for evaluation.

## 2025-03-04 NOTE — TELEPHONE ENCOUNTER
She should ask the surgeon on day of surgery to see if he will provide her a work note for her employer. If she requires a work note for more than 1 day, she should ask her PCP for a work letter.

## 2025-03-04 NOTE — TELEPHONE ENCOUNTER
Spoke to patients wife in regard to scheduling procedure. Dedra is scheduled for 4/4 for bypass. While on the phone they were asking if his Oxy could be refilled. It looks like it was prescribed while he was in house. Please call patients wife.

## 2025-03-04 NOTE — TELEPHONE ENCOUNTER
Pt's significant other, Leila, called and said Erik has surgery on April 4 and she's asking Dr. Falk what can he do to gain weight.  She said he also wants a script for Vitamin D if that can be called to his pharmacy.  Leila would like a call back with Dr. Falk's recommendation for weight gain for Erik.

## 2025-03-04 NOTE — TELEPHONE ENCOUNTER
"Relayed message to patient's spouse. She stated \"BARB Taylor stated if we need pain medication to reach out to vascular\" Patient has procedure scheduled and spouse states he should not be in ammense pain until then. Other providers refuse to give anything since its a vascular problem.   "

## 2025-03-04 NOTE — TELEPHONE ENCOUNTER
Operative Scheduling Information:     Hospital:  [facility] (only [facility], no other location, does not require hybrid)     Physician:  Me     Surgery: Left iliofemoral bypass with possible L IIA endarterectomy, possible L CFA endarterectomy     Urgency:  Standard     Level:  Level 3: Outpatients to be scheduled for elective surgery than can be delayed up to 4 weeks without reasonable expectation of detriment to patient     Case Length:  Normal     Post-op Bed:  ICU     OR Table:  Standard     Equipment Needs:  None     Medication Instructions:  None     Hydration:  No     Contrast Allergy:  no

## 2025-03-06 ENCOUNTER — OFFICE VISIT (OUTPATIENT)
Dept: CARDIOLOGY CLINIC | Facility: CLINIC | Age: 50
End: 2025-03-06
Payer: COMMERCIAL

## 2025-03-06 VITALS
RESPIRATION RATE: 16 BRPM | WEIGHT: 114 LBS | DIASTOLIC BLOOD PRESSURE: 64 MMHG | HEIGHT: 66 IN | OXYGEN SATURATION: 94 % | BODY MASS INDEX: 18.32 KG/M2 | HEART RATE: 73 BPM | SYSTOLIC BLOOD PRESSURE: 122 MMHG

## 2025-03-06 DIAGNOSIS — R06.09 DOE (DYSPNEA ON EXERTION): Primary | ICD-10-CM

## 2025-03-06 DIAGNOSIS — Z01.810 PREOPERATIVE CARDIOVASCULAR EXAMINATION: ICD-10-CM

## 2025-03-06 DIAGNOSIS — I73.9 SEVERE PERIPHERAL ARTERIAL DISEASE (HCC): ICD-10-CM

## 2025-03-06 PROCEDURE — 99214 OFFICE O/P EST MOD 30 MIN: CPT | Performed by: INTERNAL MEDICINE

## 2025-03-06 NOTE — PROGRESS NOTES
Cardiology Follow Up    Erik Cordon  1975  [id number]  [facility] [place] [id number] [phone]    Impression:  Preop cardiac evaluation/risk assessment  History of chest pain and shortness of breath- resolved  Fatigue  Left leg pain  Severe peripheral arterial disease  Smoking- cessation recommended  GERD    Plan:  He feels well with no new cardiovascular complaints.  Previous cardiac evaluation reviewed.  No further cardiac testing needed.   He is moderate risk for a high risk surgery.   Proceed with surgery as planned.   Consider future echo to follow aortic regurgitation.         Interval History: The patient is a 49-year-old male smoker who was originally seen by cardiology in May 2024 with chest pain and shortness of breath.  He underwent cardiac stress test and echo as listed below.  He now comes in for preoperative cardiovascular risk assessment prior to peripheral vascular surgery.  He has a left external iliac artery occlusion and significant rest pain.  He was admitted to [facility] 2/21/2025 for a day with claudication and left leg pain at rest.  He was discharged with vascular surgery and cardiology follow-up.    He now feels L leg pain and also gets sob with walking. He has mild chronic newsome with walking, unchanged. No cp.     Nuclear stress 6/18/2024:  1.  Resting EKG shows slow R wave progression.  No change with Lexiscan  2.  No chest discomfort  3.  Normal myocardial perfusion scan  4.  Normal wall motion.  Calculated ejection fraction is 64%.    Echocardiogram 6/18/2024: Normal LV function, EF 74%.  Mild to moderate aortic regurgitation.    EKG 2/20/25: NSR, possible old ASMI    SHIREEN R 0.8, TP 74; L 0.49, TP 30   CTA demonstrates patent L EZEQUIEL but complete occlusion of the left EIA with reconstitution at about the level of the inguinal ligament. The L IIA is patent, but  the ostium appears stenotic. L CFA w/ moderate amount of occlusive disease at the bifurcation. R EZEQUIEL patent, L EIA w/ focal proximal stenosis but patent, R IIA is chronically occluded.        Patient Active Problem List   Diagnosis    Left leg pain    GERD (gastroesophageal reflux disease)    Nicotine dependence with current use    Radiculopathy, lumbar region    WEAVER (dyspnea on exertion)    Severe peripheral arterial disease (HCC)     Past Medical History:   Diagnosis Date    Anxiety 2019    Chronic pain disorder     to back down to knees    GERD (gastroesophageal reflux disease)     Shortness of breath     with ambulation     Social History     Socioeconomic History    Marital status: Registered Domestic Partner     Spouse name: Not on file    Number of children: Not on file    Years of education: Not on file    Highest education level: Not on file   Occupational History    Not on file   Tobacco Use    Smoking status: Some Days     Current packs/day: 0.50     Average packs/day: 0.7 packs/day for 33.8 years (22.6 ttl pk-yrs)     Types: Cigarettes     Start date: 1/1/1997    Smokeless tobacco: Never   Vaping Use    Vaping status: Never Used   Substance and Sexual Activity    Alcohol use: Not Currently     Comment: holidays    Drug use: Yes     Frequency: 7.0 times per week     Types: Marijuana     Comment: 4 times a day    Sexual activity: Yes     Partners: Female   Other Topics Concern    Not on file   Social History Narrative    Not on file     Social Drivers of Health     Financial Resource Strain: Not on file   Food Insecurity: No Food Insecurity (2/21/2025)    Hunger Vital Sign     Worried About Running Out of Food in the Last Year: Never true     Ran Out of Food in the Last Year: Never true   Transportation Needs: No Transportation Needs (2/21/2025)    PRAPARE - Transportation     Lack of Transportation (Medical): No     Lack of Transportation (Non-Medical): No   Physical Activity: Not on file   Stress: Not on  file   Social Connections: Unknown (6/18/2024)    Received from [employer]     How often do you feel lonely or isolated from those around you? (Adult - for ages 18 years and over): Not on file   Intimate Partner Violence: Not on file   Housing Stability: Low Risk  (2/21/2025)    Housing Stability Vital Sign     Unable to Pay for Housing in the Last Year: No     Number of Times Moved in the Last Year: 0     Homeless in the Last Year: No      Family History   Problem Relation Age of Onset    No Known Problems Mother     No Known Problems Father     No Known Problems Sister     No Known Problems Brother     No Known Problems Maternal Grandmother     No Known Problems Maternal Grandfather     No Known Problems Paternal Grandmother     No Known Problems Paternal Grandfather     No Known Problems Maternal Aunt     No Known Problems Maternal Uncle     No Known Problems Paternal Aunt     No Known Problems Paternal Uncle     No Known Problems Cousin      Past Surgical History:   Procedure Laterality Date    LUMBAR EPIDURAL INJECTION      pain shot       Current Outpatient Medications:     albuterol (Ventolin HFA) 90 mcg/act inhaler, Inhale 2 puffs every 6 (six) hours as needed for wheezing, Disp: 18 g, Rfl: 5    atorvastatin (LIPITOR) 20 mg tablet, Take 1 tablet (20 mg total) by mouth daily with dinner, Disp: 30 tablet, Rfl: 0    clopidogrel (PLAVIX) 75 mg tablet, Take 1 tablet (75 mg total) by mouth daily Do not start before February 22, 2025., Disp: 30 tablet, Rfl: 0    ibuprofen (MOTRIN) 600 mg tablet, Take 1 tablet (600 mg total) by mouth every 6 (six) hours as needed for mild pain, Disp: 30 tablet, Rfl: 0    oxyCODONE (Roxicodone) 5 immediate release tablet, Take 1 tablet (5 mg total) by mouth every 6 (six) hours as needed for severe pain for up to 3 days Max Daily Amount: 20 mg, Disp: 12 tablet, Rfl: 0    pregabalin (LYRICA) 25 mg capsule, Take 1 capsule (25 mg total) by mouth daily at bedtime,  Disp: 14 capsule, Rfl: 0    nicotine (NICODERM CQ) 7 mg/24hr TD 24 hr patch, Place 1 patch on the skin over 24 hours every 24 hours (Patient not taking: Reported on 3/6/2025), Disp: 14 patch, Rfl: 0  Allergies   Allergen Reactions    Pollen Extract Other (See Comments)     Watery eyes       Labs:  Admission on 02/20/2025, Discharged on 02/21/2025   Component Date Value    WBC 02/20/2025 5.58     RBC 02/20/2025 4.49     Hemoglobin 02/20/2025 13.4     Hematocrit 02/20/2025 42.4     MCV 02/20/2025 94     MCH 02/20/2025 29.8     MCHC 02/20/2025 31.6     RDW 02/20/2025 12.8     MPV 02/20/2025 10.3     Platelets 02/20/2025 160     Sodium 02/20/2025 135     Potassium 02/20/2025 4.7     Chloride 02/20/2025 105     CO2 02/20/2025 24     ANION GAP 02/20/2025 6     BUN 02/20/2025 17     Creatinine 02/20/2025 1.06     Glucose 02/20/2025 77     Calcium 02/20/2025 9.3     AST 02/20/2025 17     ALT 02/20/2025 10     Alkaline Phosphatase 02/20/2025 83     Total Protein 02/20/2025 8.1     Albumin 02/20/2025 4.3     Total Bilirubin 02/20/2025 0.51     eGFR 02/20/2025 83     hs TnI 0hr 02/20/2025 30     Ventricular Rate 02/20/2025 86     Atrial Rate 02/20/2025 86     OR Interval 02/20/2025 150     QRSD Interval 02/20/2025 76     QT Interval 02/20/2025 342     QTC Interval 02/20/2025 409     P Axis 02/20/2025 81     QRS Axis 02/20/2025 28     T Wave [place] 02/20/2025 47     hs TnI 2hr 02/20/2025 38     Delta 2hr hsTnI 02/20/2025 8     Segmented % 02/20/2025 58     Lymphocytes % 02/20/2025 29     Monocytes % 02/20/2025 7     Eosinophils % 02/20/2025 2     Basophils % 02/20/2025 0     Atypical Lymphocytes % 02/20/2025 4 (H)     Absolute Neutrophils 02/20/2025 3.24     Absolute Lymphocytes 02/20/2025 1.84     Absolute Monocytes 02/20/2025 0.39     Absolute Eosinophils 02/20/2025 0.11     Absolute Basophils 02/20/2025 0.00     RBC Morphology 02/20/2025 Present     Platelet Estimate 02/20/2025 Adequate     Protime 02/20/2025 12.7     INR  02/20/2025 0.89     PTT 02/20/2025 28     hs TnI 0hr 02/20/2025 42     hs TnI 2hr 02/21/2025 45     Delta 2hr hsTnI 02/21/2025 3     hs TnI 4hr 02/21/2025 45     Delta 4hr hsTnI 02/21/2025 3    Admission on 01/15/2025, Discharged on 01/15/2025   Component Date Value    WBC 01/15/2025 6.33     RBC 01/15/2025 4.06     Hemoglobin 01/15/2025 12.3     Hematocrit 01/15/2025 37.7     MCV 01/15/2025 93     MCH 01/15/2025 30.3     MCHC 01/15/2025 32.6     RDW 01/15/2025 12.7     MPV 01/15/2025 10.3     Platelets 01/15/2025 174     Sodium 01/15/2025 139     Potassium 01/15/2025 4.0     Chloride 01/15/2025 108     CO2 01/15/2025 26     ANION GAP 01/15/2025 5     BUN 01/15/2025 16     Creatinine 01/15/2025 0.85     Glucose 01/15/2025 95     Calcium 01/15/2025 9.0     eGFR 01/15/2025 103     Magnesium 01/15/2025 1.8 (L)     Segmented % 01/15/2025 68     Lymphocytes % 01/15/2025 25     Monocytes % 01/15/2025 6     Eosinophils % 01/15/2025 1     Basophils % 01/15/2025 0     Absolute Neutrophils 01/15/2025 4.30     Absolute Lymphocytes 01/15/2025 1.58     Absolute Monocytes 01/15/2025 0.38     Absolute Eosinophils 01/15/2025 0.06     Absolute Basophils 01/15/2025 0.00     Platelet Estimate 01/15/2025 Adequate    Appointment on 12/26/2024   Component Date Value    Uric Acid 12/26/2024 4.0      Imaging: VAS ARTERIAL DUPLEX- LOWER LIMB BILATERAL  Result Date: 2/21/2025  Narrative:  [facility] REPORT CLINICAL: Indications: Patient presents with bilateral leg pain, L>R, with walking and at rest. Denies any open wounds or ulcers at this time. Operative History: No cardiovascular surgeries Risk Factors The patient has history of smoking (current) 0.5 ppd. Clinical Right Pressure:  131/ mm Hg, Left Pressure:  148/ mm Hg.  FINDINGS:  Right                  Impression  PSV (cm/s)  EDV  Common Femoral Artery  <50%               218   17  Prox Profunda          >75%               411   18  Prox SFA                                  179     0  Mid SFA                                   154    0  Dist SFA               50-75%             227    4  Proximal Pop                               76    0  Distal Pop                                 58    0  Tibioperoneal                              43       Dist Post Tibial                           46    6  Prox. Ant. Tibial                         104    4  Dist. Ant. Tibial                          51    1   Left                   Impression  Waveform    PSV (cm/s)  EDV  Prox EZEQUIEL                                              186    7  Dist EZEQUIEL                                              110    0  Prox. EIA                                             207    0  Dist EIA               Occluded                                 Common Femoral Artery              Monophasic          31    8  Prox Profunda                                          40   10  Prox SFA                                               74   17  Mid SFA                                                41    6  Dist SFA                                               70   18  Proximal Pop                                           22    5  Distal Pop                                             39    6  Tibioperoneal                                          26       Dist Post Tibial                                       20    6  Dist. Ant. Tibial                                      25    6     CONCLUSION: Impression: RIGHT LOWER LIMB: Diffuse disease noted throughout the femoral-popliteal arteries with a >70% stenosis of the proximal profunda and a 50-75% stenosis of the distal superficial femoral artery. Ankle/Brachial index:  0.80 which is in the moderate disease category. PVR/ PPG tracings are mildly dampened. Metatarsal pressure of 108 mmHg Great toe pressure of 74 mmHg, within the healing range  LEFT LOWER LIMB: Diffuse disease noted throughout the femoral-popliteal arteries with evidence of an occlusion of the mid-distal external iliac  artery with collateral flow noted to the common femoral artery. Monophasic flow is noted throughout the lower extremity distal to the occlusion. Ankle/Brachial index:  0.49 which is in the ischemic disease category. PVR/ PPG tracings are dampened. Metatarsal pressure of 62 mmHg Great toe pressure of 30 mmHg, borderline the healing range  No prior study for comparison. Technical findings were given to Polina Valencia MD  SIGNATURE: Electronically Signed by: TI LOUIS MD, RPVI on 2025-02-21 03:59:32 PM    CTA abdominal w run off w wo contrast  Result Date: 2/20/2025  Narrative: CT ANGIOGRAM OF THE AORTA AND LOWER EXTREMITIES WITH IV CONTRAST INDICATION: Abnormal vascular study results, lower extremity pain. COMPARISON: None. TECHNIQUE: CT angiogram examination of the abdomen, pelvis, and lower extremities was performed according to standard protocol with intravenous contrast. This examination, like all CT scans performed in the [facility], was performed utilizing techniques to minimize radiation dose exposure, including the use of iterative reconstruction and automated exposure control. 3D reconstructions were performed an independent workstation, and are supplied for review. Rad dose 2353 mGy-cm IV Contrast: 100 mL of iohexol Enteric Contrast: Not administered. FINDINGS: VESSELS: The abdominal aorta is patent and normal in caliber. Diffuse stenosis with focal occlusion is present at the proximal celiac artery, with reconstitution present distally. The superior mesenteric artery and inferior mesenteric artery are patent. Mild to moderate focal stenosis present at the proximal right common iliac artery. Severe diffuse stenosis present at the proximal right internal iliac artery which is patent distally. Mild to moderate stenosis is present at the proximal right external iliac artery, which is otherwise patent. The left common iliac artery and left internal iliac are patent. There is occlusion of the  entirety of the left external iliac artery with reconstitution present at the level of the left common femoral artery. Right lower extremity runoff: Moderate to severe diffuse stenosis present at the right common femoral artery. The profunda femoral artery and the superficial femoral artery are patent. There is multifocal moderate stenosis present at the proximal and mid  right popliteal artery, otherwise the popliteal artery is patent. The anterior tibial artery, tibioperoneal trunk, posterior tibial artery, and peroneal artery are patent, with three-vessel runoff to the right foot Left lower extremity runoff: Moderate to severe diffuse stenosis present at the left common femoral artery. The profunda femoral artery is patent. There is mild to moderate stenosis present at the proximal left superficial femoral artery, which is otherwise patent distally. Moderate stenosis is present at the femoral-popliteal junction. Mild to moderate multifocal stenosis present at the proximal and mid left popliteal artery. Mild focal stenosis present at the proximal anterior tibial artery. The  tibioperoneal trunk, posterior tibial artery, peroneal artery are patent, with three-vessel runoff to the left foot. OTHER FINDINGS ABDOMEN LOWER CHEST: No clinically significant abnormality in the visualized lower chest. LIVER/BILIARY TREE: Unremarkable. GALLBLADDER: No calcified gallstones. No pericholecystic inflammatory change. SPLEEN: Unremarkable. PANCREAS: Unremarkable. ADRENAL GLANDS: Unremarkable. KIDNEYS/URETERS: No hydronephrosis or urinary tract calculi. Subcentimeter hypoattenuating renal lesion(s), too small to characterize but statistically likely benign, which do not warrant follow-up (Radiology June 2019). PELVIS REPRODUCTIVE ORGANS: Unremarkable for patient's age. URINARY BLADDER: Unremarkable. ADDITIONAL ABDOMINAL AND PELVIC STRUCTURES STOMACH AND BOWEL: Unremarkable. APPENDIX: No findings to suggest appendicitis.  ABDOMINOPELVIC CAVITY: No ascites. No pneumoperitoneum. No lymphadenopathy. ABDOMINAL WALL/INGUINAL REGIONS: Unremarkable. BONES: No acute fracture or suspicious osseous lesion.     Impression: Occlusion of the entirety of the left external iliac artery, with reconstitution present at the level of the left common femoral artery. Right lower extremity: Moderate to severe diffuse stenosis present at the right common femoral artery. Multifocal moderate stenosis present at the proximal and mid right popliteal artery. Three-vessel runoff is present to the right foot. Left lower extremity: Moderate to severe diffuse stenosis present at the left common femoral artery. Mild to moderate stenosis present at the proximal left SFA. Mild to moderate stenosis present at the left popliteal artery. Three-vessel runoff is present to the left foot. Diffuse stenosis with focal occlusion present at the proximal celiac artery, with reconstitution present distally. The study was marked in EPIC for significant notification. Workstation performed: [id number]     MRI lumbar spine without contrast  Result Date: 2/20/2025  Narrative: MRI LUMBAR SPINE WITHOUT CONTRAST INDICATION: M54.16: Radiculopathy, lumbar region. COMPARISON: 6/15/2025 TECHNIQUE:  Multiplanar, multisequence imaging of the lumbar spine was performed. . IMAGE QUALITY:  Diagnostic FINDINGS: VERTEBRAL BODIES:  There are 5 lumbar type vertebral bodies.  Normal alignment of the lumbar spine.  No spondylolysis or spondylolisthesis. No scoliosis.  No compression fracture.    Normal marrow signal is identified within the visualized bony structures.  No discrete marrow lesion. SACRUM:  Normal signal within the sacrum. No evidence of insufficiency or stress fracture. DISTAL CORD AND CONUS:  Normal size and signal within the distal cord and conus. PARASPINAL SOFT TISSUES:  Paraspinal soft tissues are unremarkable. LOWER THORACIC DISC SPACES:  Normal disc height and signal.  No disc  herniation, canal stenosis or foraminal narrowing. LUMBAR DISC SPACES: There is congenital canal narrowing secondary to foreshortened pedicles. L1-L2: There is mild bulge. There is facet arthrosis. There is no significant canal stenosis or foraminal narrowing. L2-L3: There is a mild bulge. There is facet arthrosis. There is mild mass effect on the thecal sac. There is mild bilateral foraminal narrowing. L3-L4: There is a mild bulge. There is facet arthrosis. There is moderate bilateral foraminal narrowing. There is no significant canal stenosis. L4-L5: There is a mild bulge. There is facet arthrosis. There is moderate bilateral foraminal narrowing. There is no significant canal stenosis. L5-S1: No significant canal stenosis or foraminal narrowing. OTHER FINDINGS: Small right-sided renal cyst.     Impression: No significant interval change since prior examination. Degenerative changes of the lumbar spine, as described above. Workstation performed: [id number]       Review of Systems:  Review of Systems negative except for HPI    Physical Exam:  Physical Exam  GEN: Alert and oriented x 3, in no acute distress.  Well appearing and well nourished.   HEENT: Sclera anicteric, conjunctivae pink, mucous membranes moist. Oropharynx clear.   NECK: Supple, no carotid bruits, no significant JVD. Trachea midline, no thyromegaly.   HEART: Regular rhythm, normal S1 and S2, no murmurs, clicks, gallops or rubs. PMI nondisplaced, no thrills.   LUNGS: Clear to auscultation bilaterally; no wheezes, rales, or rhonchi. No increased work of breathing or signs of respiratory distress.   ABDOMEN: Soft, nontender, nondistended, normoactive bowel sounds.   EXTREMITIES: Skin warm and well perfused, no clubbing, cyanosis, or edema.  NEURO: No focal findings. Normal speech. Mood and affect normal.   SKIN: Normal without suspicious lesions on exposed skin.     1. WEAVER (dyspnea on exertion)        2. Severe peripheral arterial disease (HCC)   Ambulatory referral to Cardiology      3. Preoperative cardiovascular examination

## 2025-03-06 NOTE — TELEPHONE ENCOUNTER
Yes, ok to generate OOW note for patients wife for day of spouse surgery 4/4/25. Care of spouse for procedure.

## 2025-03-06 NOTE — TELEPHONE ENCOUNTER
Called and s/w Leila, pts spouse, she needs a note stating that the pt is having surgery and the date so she can take off work.  Is this ok to generate?

## 2025-03-07 NOTE — TELEPHONE ENCOUNTER
Please notify wife that she needs to first call her insurance company and ask them which DME they will except for boost orders and what types are available to be prescribed through that DME..  When she supplies me a fax number for that DME I can then fax over a prescription for the patient

## 2025-03-10 ENCOUNTER — TELEPHONE (OUTPATIENT)
Age: 50
End: 2025-03-10

## 2025-03-10 DIAGNOSIS — E55.9 VITAMIN D DEFICIENCY: Primary | ICD-10-CM

## 2025-03-10 NOTE — TELEPHONE ENCOUNTER
Patients significant other, Leila, was returning providers call.  Patient can use any DME and use any type of Boost.     Leila stated once the DME receives the order they can begin working on it so the patient can receive it in time for his surgery.    Please advise, thank you.

## 2025-03-10 NOTE — TELEPHONE ENCOUNTER
Patient's significant other called on behalf of the patient asking about patients vitamin D prescription?    Please advise and notify.    Thank you.

## 2025-03-11 RX ORDER — ERGOCALCIFEROL 1.25 MG/1
50000 CAPSULE, LIQUID FILLED ORAL WEEKLY
Qty: 12 CAPSULE | Refills: 1 | Status: SHIPPED | OUTPATIENT
Start: 2025-03-11

## 2025-03-11 NOTE — TELEPHONE ENCOUNTER
Please notify pt that the vitamin d supplement has been sent to their [facility] pharmacy.   I will fax over a script for boost to [facility] DME tomorrow when I am in the office.

## 2025-03-12 ENCOUNTER — TELEPHONE (OUTPATIENT)
Dept: VASCULAR SURGERY | Facility: CLINIC | Age: 50
End: 2025-03-12

## 2025-03-12 NOTE — TELEPHONE ENCOUNTER
Received fax from [address] requesting Medical Clearance for any dental procedures that he may need.  Will have provider review, complete and sign form.

## 2025-03-17 NOTE — TELEPHONE ENCOUNTER
Clearance form sent to Karely FLORES at our [place] office to have Dr. Griffin complete.

## 2025-03-18 ENCOUNTER — TELEPHONE (OUTPATIENT)
Age: 50
End: 2025-03-18

## 2025-03-18 NOTE — TELEPHONE ENCOUNTER
Alecia, with [facility] called to inform that they received an order for boost High protein but states that unfortunately they have been out of stock. They can offer Ensure High Protein as a substitute. If appropriate would they be able to obtain a new order with the Ensure Erasto protein and have it fax to [phone].

## 2025-03-18 NOTE — TELEPHONE ENCOUNTER
Dr. Griffin cannot sign the paperwork until he knows what is happening at the dental visit. There are boxes checked off for possible procedures and Dr. Griffin needs to know if they are happening or not to be able to sign off on it.

## 2025-03-18 NOTE — TELEPHONE ENCOUNTER
Wife states DME supplier is looking for diagnosis code for Boost.    Please call them at [phone]

## 2025-03-19 ENCOUNTER — TELEPHONE (OUTPATIENT)
Dept: VASCULAR SURGERY | Facility: CLINIC | Age: 50
End: 2025-03-19

## 2025-03-19 NOTE — TELEPHONE ENCOUNTER
Updated clearance form received.  Form sent to Radames CHERRY, clerical, to provide to Dr. Griffin to complete.

## 2025-03-19 NOTE — TELEPHONE ENCOUNTER
Pt's wife called to f/u on Boost High Protein drinks.  Informed wife that [facility] states it is not in stock at this time and offered an alternative.    Wife states PCP wanted pt on Boost High Protein and would like office to try to send order elsewhere as pt has upcoming surgery on 4/4/25.  Please review and advise how to proceed.

## 2025-03-19 NOTE — TELEPHONE ENCOUNTER
Contacted [facility] and spoke with Kacie to inquire what dental treatment is going to be performed for patient.  She stated that patient will need extraction and fillings.  Advised her of information from Dr. Griffin and requested a clearance form for the actual treatment patient will be having done as provider will not complete the current clearance request as it appears the treatments checked are for possible procedures.  Fax number provided and she stated that she will have a new form sent over with the correct treatments listed on form.

## 2025-03-20 ENCOUNTER — LAB REQUISITION (OUTPATIENT)
Dept: LAB | Facility: HOSPITAL | Age: 50
End: 2025-03-20
Payer: COMMERCIAL

## 2025-03-20 ENCOUNTER — APPOINTMENT (OUTPATIENT)
Age: 50
End: 2025-03-20
Payer: COMMERCIAL

## 2025-03-20 DIAGNOSIS — I73.9 SEVERE PERIPHERAL ARTERIAL DISEASE (HCC): ICD-10-CM

## 2025-03-20 DIAGNOSIS — I73.9 PERIPHERAL VASCULAR DISEASE, UNSPECIFIED (HCC): ICD-10-CM

## 2025-03-20 DIAGNOSIS — M54.16 RADICULOPATHY, LUMBAR REGION: ICD-10-CM

## 2025-03-20 LAB
ABO GROUP BLD: NORMAL
ALBUMIN SERPL BCG-MCNC: 4.1 G/DL (ref 3.5–5)
ALP SERPL-CCNC: 81 U/L (ref 34–104)
ALT SERPL W P-5'-P-CCNC: 24 U/L (ref 7–52)
ANION GAP SERPL CALCULATED.3IONS-SCNC: 7 MMOL/L (ref 4–13)
AST SERPL W P-5'-P-CCNC: 21 U/L (ref 13–39)
BILIRUB DIRECT SERPL-MCNC: 0.11 MG/DL (ref 0–0.2)
BILIRUB SERPL-MCNC: 0.35 MG/DL (ref 0.2–1)
BLD GP AB SCN SERPL QL: NEGATIVE
BUN SERPL-MCNC: 18 MG/DL (ref 5–25)
CALCIUM SERPL-MCNC: 8.9 MG/DL (ref 8.4–10.2)
CHLORIDE SERPL-SCNC: 106 MMOL/L (ref 96–108)
CO2 SERPL-SCNC: 26 MMOL/L (ref 21–32)
CREAT SERPL-MCNC: 0.8 MG/DL (ref 0.6–1.3)
CRP SERPL QL: <1 MG/L
ERYTHROCYTE [DISTWIDTH] IN BLOOD BY AUTOMATED COUNT: 13.1 % (ref 11.6–15.1)
ERYTHROCYTE [SEDIMENTATION RATE] IN BLOOD: 23 MM/HOUR (ref 0–14)
GFR SERPL CREATININE-BSD FRML MDRD: 106 ML/MIN/1.73SQ M
GLUCOSE P FAST SERPL-MCNC: 84 MG/DL (ref 65–99)
HCT VFR BLD AUTO: 40.4 % (ref 36.5–49.3)
HGB BLD-MCNC: 12.3 G/DL (ref 12–17)
INR PPP: 1 (ref 0.85–1.19)
MCH RBC QN AUTO: 29.6 PG (ref 26.8–34.3)
MCHC RBC AUTO-ENTMCNC: 30.4 G/DL (ref 31.4–37.4)
MCV RBC AUTO: 97 FL (ref 82–98)
PLATELET # BLD AUTO: 155 THOUSANDS/UL (ref 149–390)
PMV BLD AUTO: 11.5 FL (ref 8.9–12.7)
POTASSIUM SERPL-SCNC: 4.7 MMOL/L (ref 3.5–5.3)
PROT SERPL-MCNC: 7.1 G/DL (ref 6.4–8.4)
PROTHROMBIN TIME: 13.5 SECONDS (ref 12.3–15)
RBC # BLD AUTO: 4.15 MILLION/UL (ref 3.88–5.62)
RH BLD: POSITIVE
SODIUM SERPL-SCNC: 139 MMOL/L (ref 135–147)
SPECIMEN EXPIRATION DATE: NORMAL
WBC # BLD AUTO: 5.12 THOUSAND/UL (ref 4.31–10.16)

## 2025-03-20 PROCEDURE — 86901 BLOOD TYPING SEROLOGIC RH(D): CPT | Performed by: STUDENT IN AN ORGANIZED HEALTH CARE EDUCATION/TRAINING PROGRAM

## 2025-03-20 PROCEDURE — 85027 COMPLETE CBC AUTOMATED: CPT

## 2025-03-20 PROCEDURE — 85652 RBC SED RATE AUTOMATED: CPT

## 2025-03-20 PROCEDURE — 80076 HEPATIC FUNCTION PANEL: CPT

## 2025-03-20 PROCEDURE — 86850 RBC ANTIBODY SCREEN: CPT | Performed by: STUDENT IN AN ORGANIZED HEALTH CARE EDUCATION/TRAINING PROGRAM

## 2025-03-20 PROCEDURE — 36415 COLL VENOUS BLD VENIPUNCTURE: CPT

## 2025-03-20 PROCEDURE — 85610 PROTHROMBIN TIME: CPT

## 2025-03-20 PROCEDURE — 80048 BASIC METABOLIC PNL TOTAL CA: CPT

## 2025-03-20 PROCEDURE — 86140 C-REACTIVE PROTEIN: CPT

## 2025-03-20 PROCEDURE — 86618 LYME DISEASE ANTIBODY: CPT

## 2025-03-20 PROCEDURE — 86900 BLOOD TYPING SEROLOGIC ABO: CPT | Performed by: STUDENT IN AN ORGANIZED HEALTH CARE EDUCATION/TRAINING PROGRAM

## 2025-03-21 ENCOUNTER — TELEPHONE (OUTPATIENT)
Age: 50
End: 2025-03-21

## 2025-03-21 LAB — B BURGDOR IGG+IGM SER QL IA: NEGATIVE

## 2025-03-21 NOTE — TELEPHONE ENCOUNTER
Alecia with [employer] called to let the provider know that the authorization for patients medical supplies has been denied, due to not meeting the medical necessity. She states if the provider would like to do a peer to peer, they are available until 5 and the provider can call [phone]

## 2025-03-24 NOTE — TELEPHONE ENCOUNTER
Dental Clearance form completed on 3/19/25 and faxed to [facility].  Completed form scanned in to patient's chart.

## 2025-03-25 NOTE — TELEPHONE ENCOUNTER
Leila called into office stating she had someone on the line that she needed the office to speak too because there has been on ongoing situation for the last couple weeks. Leila spoke very quickly and I was not able to grasp what was needed.     Before attempting to gather more information, she quickly stated she needed to put me on hold to call someone.     I waited over 5 mins and Leila never returned to the call and I disconnected.

## 2025-03-25 NOTE — TELEPHONE ENCOUNTER
She states we have to go through a company called [employer]- I stated we go through tomorrow [place] .. She stated she will call back

## 2025-03-25 NOTE — TELEPHONE ENCOUNTER
Per insurance company - we can no longer appeal the appeal would have to come from the patient . They can call the number on the back of the card

## 2025-03-26 NOTE — TELEPHONE ENCOUNTER
Leila is calling about the protein drinks.  She says that the company has not received the fax.  I asked her for the fax number - [phone].  Please check the fax # to be sure that it's correct. Please let her know.  Thank you.

## 2025-03-28 ENCOUNTER — CONSULT (OUTPATIENT)
Age: 50
End: 2025-03-28
Payer: COMMERCIAL

## 2025-03-28 VITALS
HEIGHT: 66 IN | SYSTOLIC BLOOD PRESSURE: 98 MMHG | BODY MASS INDEX: 18.32 KG/M2 | HEART RATE: 74 BPM | DIASTOLIC BLOOD PRESSURE: 62 MMHG | RESPIRATION RATE: 18 BRPM | WEIGHT: 114 LBS | OXYGEN SATURATION: 99 % | TEMPERATURE: 97.1 F

## 2025-03-28 DIAGNOSIS — Z01.818 PRE-OP EVALUATION: Primary | ICD-10-CM

## 2025-03-28 PROCEDURE — 99214 OFFICE O/P EST MOD 30 MIN: CPT | Performed by: FAMILY MEDICINE

## 2025-03-28 NOTE — PROGRESS NOTES
Pre-operative Clearance  Name: Erik Cordon      : 1975      MRN: [id number]  Encounter Provider: Erendira Falk DO  Encounter Date: 3/28/2025   Encounter department: [facility] [place]    Assessment & Plan  Pre-op evaluation         Pre-operative Clearance:     Revised Cardiac Risk Index:  RCI RISK CLASS I (0 risk factors, risk of major cardiac complications approximately 0.5%)    Clearance:  Patient is medically optimized (CLEARED) for proposed surgery without any additional cardiac testing.      Medication Instructions:   - Avoid herbs or non-directed vitamins one week prior to surgery    - Avoid aspirin containing medications or non-steroidal anti-inflammatory drugs one week preceding surgery    - Clopidogrel (Plavix): Should be discontinued one week prior to planned operation, unless specifically stated otherwise by surgeon. If coronary stents have been placed in the past year, instructions for this medication should be given by cardiology.  - Hyperlipidemia meds: Continue to take this medication on your normal schedule.      Depression Screening and Follow-up Plan: Patient's depression screening was positive with a PHQ-2 score of 3. Their PHQ-9 score was 14.   Depression likely due to other medical condition. Will treat underlying condition.       History of Present Illness     Pt presents for post op evaluation. Reviewed ECG from  and Stress test from 2024      Review of Systems   Constitutional:  Positive for fatigue. Negative for fever.   Cardiovascular:  Negative for chest pain.   Musculoskeletal:  Positive for myalgias.     Past Medical History   Past Medical History:   Diagnosis Date    Anxiety 2019    Chronic pain disorder     to back down to knees    Difficulty walking     GERD (gastroesophageal reflux disease)     Shortness of breath     with ambulation     Past Surgical History:   Procedure Laterality Date    LUMBAR EPIDURAL INJECTION       pain shot     Family History   Problem Relation Age of Onset    No Known Problems Mother     No Known Problems Father     No Known Problems Sister     No Known Problems Brother     No Known Problems Maternal Grandmother     No Known Problems Maternal Grandfather     No Known Problems Paternal Grandmother     No Known Problems Paternal Grandfather     No Known Problems Maternal Aunt     No Known Problems Maternal Uncle     No Known Problems Paternal Aunt     No Known Problems Paternal Uncle     No Known Problems Cousin      Social History     Tobacco Use    Smoking status: Passive Smoke Exposure - Never Smoker    Smokeless tobacco: Never   Vaping Use    Vaping status: Never Used   Substance and Sexual Activity    Alcohol use: Not Currently     Comment: holidays    Drug use: Yes     Frequency: 7.0 times per week     Types: Marijuana     Comment: 4 times a day    Sexual activity: Yes     Partners: Female     Current Outpatient Medications on File Prior to Visit   Medication Sig    albuterol (Ventolin HFA) 90 mcg/act inhaler Inhale 2 puffs every 6 (six) hours as needed for wheezing    atorvastatin (LIPITOR) 20 mg tablet Take 1 tablet (20 mg total) by mouth daily with dinner    clopidogrel (PLAVIX) 75 mg tablet Take 1 tablet (75 mg total) by mouth daily Do not start before February 22, 2025.    ergocalciferol (VITAMIN D2) 50,000 units Take 1 capsule (50,000 Units total) by mouth once a week    ibuprofen (MOTRIN) 600 mg tablet Take 1 tablet (600 mg total) by mouth every 6 (six) hours as needed for mild pain    amoxicillin (AMOXIL) 500 mg capsule Take 500 mg by mouth every 8 (eight) hours (Patient not taking: Reported on 3/28/2025)    nicotine (NICODERM CQ) 7 mg/24hr TD 24 hr patch Place 1 patch on the skin over 24 hours every 24 hours (Patient not taking: Reported on 3/6/2025)    pregabalin (LYRICA) 25 mg capsule Take 1 capsule (25 mg total) by mouth daily at bedtime (Patient not taking: Reported on 3/28/2025)      Allergies   Allergen Reactions    Pollen Extract Other (See Comments)     Watery eyes     Objective   There were no vitals taken for this visit.    Physical Exam  HENT:      Head: Normocephalic.      Right Ear: External ear normal.      Left Ear: External ear normal.   Eyes:      Conjunctiva/sclera: Conjunctivae normal.   Cardiovascular:      Rate and Rhythm: Normal rate and regular rhythm.   Pulmonary:      Effort: Pulmonary effort is normal.      Breath sounds: Normal breath sounds.   Neurological:      Mental Status: He is alert and oriented to person, place, and time.      Gait: Gait abnormal.   Psychiatric:         Attention and Perception: He is inattentive.         Mood and Affect: Affect is labile.         Speech: Speech is rapid and pressured.         Behavior: Behavior is cooperative.           Erendira Falk,

## 2025-03-31 ENCOUNTER — TELEPHONE (OUTPATIENT)
Age: 50
End: 2025-03-31

## 2025-03-31 NOTE — TELEPHONE ENCOUNTER
Zuleyka called to let provider know that her company does not carry Boost Hi Protein. Please call her back with any questions. She did send a fax over.

## 2025-04-07 ENCOUNTER — TELEPHONE (OUTPATIENT)
Dept: VASCULAR SURGERY | Facility: CLINIC | Age: 50
End: 2025-04-07

## 2025-04-07 ENCOUNTER — TELEPHONE (OUTPATIENT)
Age: 50
End: 2025-04-07

## 2025-04-07 DIAGNOSIS — I74.5 OCCLUSION OF EXTERNAL ILIAC ARTERY (HCC): ICD-10-CM

## 2025-04-07 RX ORDER — CLOPIDOGREL BISULFATE 75 MG/1
75 TABLET ORAL DAILY
Qty: 90 TABLET | Refills: 0 | Status: SHIPPED | OUTPATIENT
Start: 2025-04-07

## 2025-04-07 NOTE — TELEPHONE ENCOUNTER
Caller: Leila - significant other    Doctor: Dr. Griffin    Reason for call: Patient is scheduled at 9:00 am today at [facility] to have 8 teeth extracted. He has been off of his blood thinner for a week because he was scheduled for surgery 4/4/25 which was cancelled.  [facility] is requesting a note faxed stating that he is not on a blood thinner. Fax: [phone]    Phone: [phone]    Call back#: [phone]

## 2025-04-07 NOTE — TELEPHONE ENCOUNTER
Spoke with Glenys,  from dental office. She will need a note stating how long it is recommended to hold the blood thinner prior to procedure, and when he can resume.   Patient's wife states he has been off blood thinner for 9 days, last dose 3/28/25.   Patient is waiting in office to have surgery until letter is sent.     Office would prefer email if possible.    Glenys email:  [email or web address]  Fax# [phone]    CB# [phone]

## 2025-04-07 NOTE — PROGRESS NOTES
Vascular Surgery Note-     Received call from our surgery schedulers regarding an upcoming dental procedure that patient will be having today. Patient is currently scheduled for extraction of 8 teeth due to dental infection. We were asked to provide clearance for procedure which was completed by Dr. Griffin and scanned into the media tab on 3/19/25. Patient recently scheduled for LLE revascularization 4/4/25, however was cancelled due to underlying dental infection. Patient and wife reported today to our staff that he has been off all of his medication for the last week and discontinued these on his own, including his plavix. No plavix hold is required for revascularization and being off of his antiplatelet medications puts him at risk for ALI and limb loss. Patient is ok to proceed with dental extractions today but would recommend restarting plavix in the immediate post op setting as well as his atorvastatin.     Lucy Werner PA-C  [facility]  [phone]

## 2025-04-07 NOTE — TELEPHONE ENCOUNTER
Patient's wife calling in to follow up in regards to the letter that states that he has been off blood thinner and when he can re start the blood thinner again; Called nurses line to help with call;

## 2025-04-07 NOTE — TELEPHONE ENCOUNTER
Spoke to patients wife and dental office, as they are now requesting a new clearance form be filled out and sent back with most recent labs and EKG before dental procedure can proceed.

## 2025-04-09 DIAGNOSIS — I73.9 SEVERE PERIPHERAL ARTERIAL DISEASE (HCC): Primary | ICD-10-CM

## 2025-04-09 DIAGNOSIS — I74.5 OCCLUSION OF EXTERNAL ILIAC ARTERY (HCC): ICD-10-CM

## 2025-04-09 RX ORDER — OXYCODONE HYDROCHLORIDE 5 MG/1
5 TABLET ORAL EVERY 6 HOURS PRN
Qty: 12 TABLET | Refills: 0 | Status: SHIPPED | OUTPATIENT
Start: 2025-04-09 | End: 2025-04-17

## 2025-04-09 RX ORDER — ATORVASTATIN CALCIUM 40 MG/1
40 TABLET, FILM COATED ORAL DAILY
Qty: 90 TABLET | Refills: 0 | Status: SHIPPED | OUTPATIENT
Start: 2025-04-09

## 2025-04-09 NOTE — TELEPHONE ENCOUNTER
Pt's wife Leila call regarding pt being cleared for dental procedure. Attempted warm transfer to vascular office however Leila hung up.    Please follow up with pt

## 2025-04-09 NOTE — TELEPHONE ENCOUNTER
Patient's wife calling back, requesting another prescription for Oxycodone because patient is out of pain medication. She is also requesting to speak to someone about when dental clearance will be completed and when patient can be scheduled for surgery.

## 2025-04-09 NOTE — TELEPHONE ENCOUNTER
*Pain medication  -Oxycodone 5 #12 sent to local pharmacy for ischemic rest pain  -Recommend APAP 500 q 4 and oxycodone as needed  -Counseling regarding opioid pain medication    *Clearance note for dental surgery  -Note was already provided on 4/7/2025 by CHELSEY Jules  -? Apparently needs ECG, blood work and related clearance which would need to go to family MD  -Will ask clinical staff to follow-up with dental office    FYI: Dr. Griffin

## 2025-04-10 ENCOUNTER — TELEPHONE (OUTPATIENT)
Age: 50
End: 2025-04-10

## 2025-04-10 DIAGNOSIS — Z01.818 PRE-OP EVALUATION: Primary | ICD-10-CM

## 2025-04-10 RX ORDER — ATORVASTATIN CALCIUM 20 MG/1
20 TABLET, FILM COATED ORAL
Qty: 30 TABLET | Refills: 0 | OUTPATIENT
Start: 2025-04-10

## 2025-04-10 NOTE — TELEPHONE ENCOUNTER
Wife   just   talk  to  raven   about   dental  appt    coming   up   but  he  need   EKG  and  labs   done   wants  to  know  if  she  can  just  order  the  lab  work     he  only   wants  to   see   raven    or  can  I  just  15 min   appt   on   Friday  or  Monday

## 2025-04-10 NOTE — TELEPHONE ENCOUNTER
Left detailed message that it needs to be pre op w EKG .    And that dr carbajal ordered labs- needs 30 minute appointment

## 2025-04-11 NOTE — TELEPHONE ENCOUNTER
Leila calling back. Relayed previous message from DARI Ramirez PA-C. Leila verbalized understanding.

## 2025-04-11 NOTE — TELEPHONE ENCOUNTER
Patient's wife returned call.     Advised of Dr. Falk's message to scheduled a PreOp Clearance w/EKG for his dental procedure.     Dr. Falk had no openings until 5/5. The wife said it has to be sooner.     Offered another provider but that was declined.     Spoke with the office. Unfortunately she wasn't able to assist with scheduling

## 2025-04-11 NOTE — TELEPHONE ENCOUNTER
Caller: Leila    Doctor: Dr. Griffin    Reason for call: Wife states that the patient needs a new dental clearance.     Call back#: [phone]

## 2025-04-11 NOTE — TELEPHONE ENCOUNTER
Contacted [facility] and spoke with Malick to inquire what is being requested.  Informed him patient's wife contact office to stated a new clearance is being requested.  He stated that they need the Medical clearance to state that it is ok to hold Plavix before they can scheudle the procedure.  Informed him that it was previously mentioned that patient will need an EKG and blood work prior to them scheduling procedure and informed him that this would need to come from patient's family doctor.  Requested that they send over a new clearance form to include request to hold Plavix, length of Plavix hold being requested by performing dentist, and to include exact procedure patient will be having at their office.  Provided him with office fax number to send this new clearance request to.

## 2025-04-15 NOTE — TELEPHONE ENCOUNTER
Please look in media for Dental Clearance if pt's S.O. calls back. Dr. Griffin has written out a note and signed the paperwork. Faxed over to the office as well.

## 2025-04-16 ENCOUNTER — TELEPHONE (OUTPATIENT)
Age: 50
End: 2025-04-16

## 2025-04-16 DIAGNOSIS — E44.0 MODERATE PROTEIN-CALORIE MALNUTRITION (HCC): Primary | ICD-10-CM

## 2025-04-16 NOTE — TELEPHONE ENCOUNTER
Zoya w/ [employer] plan was a warm transfer from [facility] as she needed further assistance in regards to patient's nutritional supplement (boost) that was submitted to insurance by supplier.  Zoya states that the initial request for [employer] nutritional supplement was denied back in March as the Clinical director through [employer] stated that additional documentation was require and didn't meet medical necessity.  From chart review we never received information by [employer] stating that they needed additional information and that it was denied.  A warm transfer to the office was made to make sure that I wasn't missing anything and let them know that Zoya had faxed over the denial from march to the office fax # [phone]. Zoya stated documentation that would show an ongoing nutritional follow up stating patient needs this. She suggested having nutritional services be refer to help the patient w/ his request for the nutritional supplements.  Zoya stated that she would withdraw this request and wait for provider to review and send appropriate referral to nutritionist service.  If further questions she can be reached at [phone].

## 2025-04-17 ENCOUNTER — TELEPHONE (OUTPATIENT)
Age: 50
End: 2025-04-17

## 2025-04-17 ENCOUNTER — CONSULT (OUTPATIENT)
Age: 50
End: 2025-04-17
Payer: COMMERCIAL

## 2025-04-17 ENCOUNTER — APPOINTMENT (OUTPATIENT)
Age: 50
End: 2025-04-17
Attending: FAMILY MEDICINE
Payer: COMMERCIAL

## 2025-04-17 VITALS
SYSTOLIC BLOOD PRESSURE: 110 MMHG | HEIGHT: 66 IN | RESPIRATION RATE: 18 BRPM | OXYGEN SATURATION: 99 % | DIASTOLIC BLOOD PRESSURE: 60 MMHG | WEIGHT: 113.2 LBS | BODY MASS INDEX: 18.19 KG/M2 | HEART RATE: 75 BPM | TEMPERATURE: 97.5 F

## 2025-04-17 DIAGNOSIS — Z01.818 PRE-OP EVALUATION: Primary | ICD-10-CM

## 2025-04-17 DIAGNOSIS — Z01.818 PRE-OP EVALUATION: ICD-10-CM

## 2025-04-17 LAB
ANION GAP SERPL CALCULATED.3IONS-SCNC: 6 MMOL/L (ref 4–13)
BUN SERPL-MCNC: 20 MG/DL (ref 5–25)
CALCIUM SERPL-MCNC: 9.1 MG/DL (ref 8.4–10.2)
CHLORIDE SERPL-SCNC: 107 MMOL/L (ref 96–108)
CO2 SERPL-SCNC: 27 MMOL/L (ref 21–32)
CREAT SERPL-MCNC: 0.91 MG/DL (ref 0.6–1.3)
ERYTHROCYTE [DISTWIDTH] IN BLOOD BY AUTOMATED COUNT: 13 % (ref 11.6–15.1)
GFR SERPL CREATININE-BSD FRML MDRD: 100 ML/MIN/1.73SQ M
GLUCOSE P FAST SERPL-MCNC: 87 MG/DL (ref 65–99)
HCT VFR BLD AUTO: 43.3 % (ref 36.5–49.3)
HGB BLD-MCNC: 13 G/DL (ref 12–17)
MCH RBC QN AUTO: 29.5 PG (ref 26.8–34.3)
MCHC RBC AUTO-ENTMCNC: 30 G/DL (ref 31.4–37.4)
MCV RBC AUTO: 98 FL (ref 82–98)
PLATELET # BLD AUTO: 162 THOUSANDS/UL (ref 149–390)
PMV BLD AUTO: 11.6 FL (ref 8.9–12.7)
POTASSIUM SERPL-SCNC: 5 MMOL/L (ref 3.5–5.3)
RBC # BLD AUTO: 4.41 MILLION/UL (ref 3.88–5.62)
SODIUM SERPL-SCNC: 140 MMOL/L (ref 135–147)
WBC # BLD AUTO: 7.65 THOUSAND/UL (ref 4.31–10.16)

## 2025-04-17 PROCEDURE — 99214 OFFICE O/P EST MOD 30 MIN: CPT | Performed by: FAMILY MEDICINE

## 2025-04-17 PROCEDURE — 93000 ELECTROCARDIOGRAM COMPLETE: CPT | Performed by: FAMILY MEDICINE

## 2025-04-17 PROCEDURE — 80048 BASIC METABOLIC PNL TOTAL CA: CPT

## 2025-04-17 PROCEDURE — 36415 COLL VENOUS BLD VENIPUNCTURE: CPT

## 2025-04-17 PROCEDURE — 85027 COMPLETE CBC AUTOMATED: CPT

## 2025-04-17 NOTE — TELEPHONE ENCOUNTER
I can fax the EKG but the labs are still in process I cannot fax until it is completed and resulted by the lab

## 2025-04-17 NOTE — TELEPHONE ENCOUNTER
Dorina from dental office called asking for medical clearance to be faxed to them asap, pt had apt today for medical clearance and  is scheduled for oral surgery tomorrow.  Advised pt was seen by pcp today for medical clearance, he did not see vascular surgery today. Dorina requested to be transf to pcp office - call transf.

## 2025-04-17 NOTE — PROGRESS NOTES
Pre-operative Clearance  Name: Erik Cordon      : 1975      MRN: [id number]  Encounter Provider: Erendira Falk DO  Encounter Date: 2025   Encounter department: [facility]    :  Assessment & Plan  Pre-op evaluation  Ecg unchanged when compared to 2025 ecg.   Orders:    POCT ECG        Pre-operative Clearance:     Revised Cardiac Risk Index:  RCI RISK CLASS I (0 risk factors, risk of major cardiac complications approximately 0.5%)    Clearance:  Patient is medically optimized (CLEARED) for proposed surgery without any additional cardiac testing.      Medication Instructions:   - Avoid herbs or non-directed vitamins one week prior to surgery    - May take tylenol for pain up until the night before surgery    - Clopidogrel (Plavix): Should be discontinued one week prior to planned operation, unless specifically stated otherwise by surgeon. If coronary stents have been placed in the past year, instructions for this medication should be given by cardiology.  - Hyperlipidemia meds: Continue to take this medication on your normal schedule.       History of Present Illness     Pre-Op Examination     Surgery: teeth extraction   Anticipated Date of Surgery: to be determined   Surgeon: [facility]     Dental provider requested an UTD clearance and ECG prior to extraction      Previous history of bleeding disorders or clots?: No    Previous Anesthesia reaction?: No    Prolonged steroid use in the last 6 months?: No      Assessment of Cardiac Risk:   - Unstable or severe angina or MI in the last 6 weeks or history of stent placement in the last year?: No    - Decompensated heart failure (e.g. New onset heart failure, NYHA  Class IV heart failure, or worsening existing heart failure)?: No    - Significant arrhythmias such as high grade AV block, symptomatic ventricular arrhythmia, newly recognized ventricular tachycardia, supraventricular tachycardia with resting  heart rate >100, or symptomatic bradycardia?: No    - Severe heart valve disease including aortic stenosis or symptomatic mitral stenosis?: No      Pre-operative Risk Factors:    - History of cerebrovascular disease: No    - History of ischemic heart disease: No    - History of congestive heart failure: No    - Pre-operative treatment with insulin: No    - Pre-operative creatinine >2 mg/dL: No      Review of Systems   Constitutional:  Negative for fever.   Respiratory:  Negative for shortness of breath.    Cardiovascular:  Negative for chest pain.     Past Medical History   Past Medical History:   Diagnosis Date    Anxiety 2019    Chronic pain disorder     to back down to knees    Difficulty walking 2023    GERD (gastroesophageal reflux disease)     Shortness of breath     with ambulation     Past Surgical History:   Procedure Laterality Date    LUMBAR EPIDURAL INJECTION      pain shot     Family History   Problem Relation Age of Onset    No Known Problems Mother     No Known Problems Father     No Known Problems Sister     No Known Problems Brother     No Known Problems Maternal Grandmother     No Known Problems Maternal Grandfather     No Known Problems Paternal Grandmother     No Known Problems Paternal Grandfather     No Known Problems Maternal Aunt     No Known Problems Maternal Uncle     No Known Problems Paternal Aunt     No Known Problems Paternal Uncle     No Known Problems Cousin      Social History     Tobacco Use    Smoking status: Passive Smoke Exposure - Never Smoker    Smokeless tobacco: Never   Vaping Use    Vaping status: Never Used   Substance and Sexual Activity    Alcohol use: Not Currently     Comment: holidays    Drug use: Yes     Frequency: 7.0 times per week     Types: Marijuana     Comment: 4 times a day    Sexual activity: Yes     Partners: Female     Current Outpatient Medications on File Prior to Visit   Medication Sig    albuterol (Ventolin HFA) 90 mcg/act inhaler Inhale 2 puffs every 6  "(six) hours as needed for wheezing    atorvastatin (LIPITOR) 40 mg tablet Take 1 tablet (40 mg total) by mouth daily    clopidogrel (PLAVIX) 75 mg tablet Take 1 tablet (75 mg total) by mouth daily    ibuprofen (MOTRIN) 600 mg tablet Take 1 tablet (600 mg total) by mouth every 6 (six) hours as needed for mild pain    oxyCODONE (Roxicodone) 5 immediate release tablet Take 1 tablet (5 mg total) by mouth every 6 (six) hours as needed for moderate pain or severe pain Max Daily Amount: 20 mg    ergocalciferol (VITAMIN D2) 50,000 units Take 1 capsule (50,000 Units total) by mouth once a week (Patient not taking: Reported on 4/17/2025)    nicotine (NICODERM CQ) 7 mg/24hr TD 24 hr patch Place 1 patch on the skin over 24 hours every 24 hours (Patient not taking: Reported on 3/6/2025)     Allergies   Allergen Reactions    Pollen Extract Other (See Comments)     Watery eyes     Objective   /60 (BP Location: Left arm, Patient Position: Sitting, Cuff Size: Standard)   Pulse 75   Temp 97.5 °F (36.4 °C) (Tympanic)   Resp 18   Ht 5' 6\" (1.676 m)   Wt 51.3 kg (113 lb 3.2 oz)   SpO2 99%   BMI 18.27 kg/m²     Physical Exam  HENT:      Head: Normocephalic.      Right Ear: External ear normal.      Left Ear: External ear normal.   Eyes:      Conjunctiva/sclera: Conjunctivae normal.   Cardiovascular:      Rate and Rhythm: Normal rate and regular rhythm.   Pulmonary:      Effort: Pulmonary effort is normal.      Breath sounds: Normal breath sounds.   Abdominal:      General: Bowel sounds are normal.      Palpations: Abdomen is soft.   Musculoskeletal:      Right lower leg: No edema.      Left lower leg: No edema.   Neurological:      Mental Status: He is alert and oriented to person, place, and time.      Gait: Gait normal.   Psychiatric:         Mood and Affect: Mood normal.           Erendira Falk, DO  "

## 2025-04-24 ENCOUNTER — HOSPITAL ENCOUNTER (OUTPATIENT)
Dept: PULMONOLOGY | Facility: HOSPITAL | Age: 50
End: 2025-04-24
Payer: COMMERCIAL

## 2025-04-24 ENCOUNTER — OFFICE VISIT (OUTPATIENT)
Age: 50
End: 2025-04-24
Payer: COMMERCIAL

## 2025-04-24 VITALS — WEIGHT: 113 LBS | BODY MASS INDEX: 18.16 KG/M2 | HEIGHT: 66 IN

## 2025-04-24 DIAGNOSIS — F17.200 NICOTINE DEPENDENCE WITH CURRENT USE: ICD-10-CM

## 2025-04-24 DIAGNOSIS — G89.29 TOE PAIN, CHRONIC, LEFT: ICD-10-CM

## 2025-04-24 DIAGNOSIS — R06.09 DOE (DYSPNEA ON EXERTION): ICD-10-CM

## 2025-04-24 DIAGNOSIS — M79.675 TOE PAIN, CHRONIC, LEFT: ICD-10-CM

## 2025-04-24 DIAGNOSIS — L60.0 INGROWN TOENAIL OF LEFT FOOT: ICD-10-CM

## 2025-04-24 DIAGNOSIS — I73.9 PAD (PERIPHERAL ARTERY DISEASE) (HCC): Primary | ICD-10-CM

## 2025-04-24 PROCEDURE — 94726 PLETHYSMOGRAPHY LUNG VOLUMES: CPT

## 2025-04-24 PROCEDURE — 94010 BREATHING CAPACITY TEST: CPT | Performed by: INTERNAL MEDICINE

## 2025-04-24 PROCEDURE — 94729 DIFFUSING CAPACITY: CPT

## 2025-04-24 PROCEDURE — 94729 DIFFUSING CAPACITY: CPT | Performed by: INTERNAL MEDICINE

## 2025-04-24 PROCEDURE — 94760 N-INVAS EAR/PLS OXIMETRY 1: CPT

## 2025-04-24 PROCEDURE — 94010 BREATHING CAPACITY TEST: CPT

## 2025-04-24 PROCEDURE — 94726 PLETHYSMOGRAPHY LUNG VOLUMES: CPT | Performed by: INTERNAL MEDICINE

## 2025-04-24 PROCEDURE — 99203 OFFICE O/P NEW LOW 30 MIN: CPT | Performed by: STUDENT IN AN ORGANIZED HEALTH CARE EDUCATION/TRAINING PROGRAM

## 2025-04-24 RX ORDER — ALBUTEROL SULFATE 0.83 MG/ML
2.5 SOLUTION RESPIRATORY (INHALATION) ONCE
Status: DISCONTINUED | OUTPATIENT
Start: 2025-04-24 | End: 2025-04-24

## 2025-04-24 NOTE — PATIENT INSTRUCTIONS
Purchase a supportive pair of sneakers such as [employer], [employer]oka, [employer]y, New Balance, On Cloud, Altra.  Some qualities to look for is that the shoe should bend only where the toes bend, the sole should not be too flimsy, it should have a wide toe box and a somewhat stiff heel support. Purchase a supportive over-the-counter pair of inserts such as Superfeet, powersteps, tread labs.    [employer]  [address] [facility] [address]  [phone]     [employer]  [address], [place] [address]  [phone]     [employer]  [address] [place] [address] [phone]     [employer]   [address] [facility] [address] [facility]  [phone]     [employer]  [address], [place], [place] [address] [phone]     [employer]  [address] [place], [place] [id number] [phone]     [facility]   [address] [place] [address]  [phone]     [employer]  [address] [place], [place]  [phone]     [employer] [address], [address]  [phone]     [employer]  [address] [place] [address], [address], [place] [address] [phone]

## 2025-04-24 NOTE — PROGRESS NOTES
"[facility] Office Visit    ASSESSMENT     Diagnoses and all orders for this visit:    PAD (peripheral artery disease) (HCC)    Toe pain, chronic, left  -     Ambulatory Referral to Podiatry    Ingrown toenail of left foot         Problem List Items Addressed This Visit    None  Visit Diagnoses         PAD (peripheral artery disease) (HCC)    -  Primary      Toe pain, chronic, left          Ingrown toenail of left foot              PLAN  Erik and I discussed his left foot.  I do believe that he has a mild, not infected ingrown toenail to the left hallux.  Unfortunately his vascular status prohibits me from performing any invasive procedure on this area.  I did recommend that he wear wider sneakers to prevent rubbing on this area.  Should any signs of infection present he is to call my office immediately for an appointment.  He should continue to follow-up with vascular surgery in regard to scheduling his intervention.    SUBJECTIVE    Chief Complaint:  Left big toe pain     Patient ID: Erik Valencia is a pleasant 49 year old male who presents today with left foot pain. He states that his left great toenail has been bothering him for the last few months. Denies any blood or pus. He also mentions that his foot is always cold.         The following portions of the patient's history were reviewed and updated as appropriate: allergies, current medications, past family history, past medical history, past social history, past surgical history and problem list.    Review of Systems   Constitutional: Negative.    Respiratory: Negative.     Cardiovascular: Negative.    Gastrointestinal: Negative.    Genitourinary: Negative.    Musculoskeletal:  Positive for arthralgias.   Skin: Negative.          OBJECTIVE      Ht 5' 6\" (1.676 m)   Wt 51.3 kg (113 lb)   BMI 18.24 kg/m²        Physical Exam  Constitutional:       Appearance: Normal appearance.   HENT:      Head: " Mother calling/states she will take pt.to the [facility] now .  She was advised  had not reviewed the message yet.  States please cancel the message.     Normocephalic and atraumatic.   Eyes:      General:         Right eye: No discharge.         Left eye: No discharge.   Cardiovascular:      Rate and Rhythm: Normal rate and regular rhythm.      Pulses:           Dorsalis pedis pulses are 0 on the right side and 0 on the left side.        Posterior tibial pulses are 0 on the right side and 0 on the left side.   Pulmonary:      Effort: Pulmonary effort is normal.      Breath sounds: Normal breath sounds.   Feet:      Comments: Pain on palpation to the medial aspect of the left hallux consistent with onychocryptosis  Skin:     General: Skin is warm.      Capillary Refill: Capillary refill takes less than 2 seconds.   Neurological:      Mental Status: He is alert and oriented to person, place, and time.      Sensory: Sensation is intact. No sensory deficit.   Psychiatric:         Mood and Affect: Mood normal.

## 2025-04-25 ENCOUNTER — TELEPHONE (OUTPATIENT)
Age: 50
End: 2025-04-25

## 2025-04-25 NOTE — TELEPHONE ENCOUNTER
Pt has been off his blood thinner for a week but didn't do the surgery for his teeth, it was cancelled and now has an appt next week,for a consultation and will reschedule the surgery please advise as to when he should start his blood thinner? He went tot he podiatry and they stated that he had no pulse in his foot.she also stated that he is cold and is in a lot of pain

## 2025-04-28 ENCOUNTER — VBI (OUTPATIENT)
Dept: ADMINISTRATIVE | Facility: OTHER | Age: 50
End: 2025-04-28

## 2025-04-28 ENCOUNTER — TELEPHONE (OUTPATIENT)
Age: 50
End: 2025-04-28

## 2025-04-28 NOTE — TELEPHONE ENCOUNTER
Pt's wife Leila called stating that she would like to drop off a dental clearance to the office. She asked if she could drop it off at the [facility] office. Advised yes

## 2025-04-28 NOTE — TELEPHONE ENCOUNTER
04/28/25 9:05 AM     Chart reviewed for Pap Smear (HPV) aka Cervical Cancer Screening was/were not submitted to the patient's insurance.     Carol Dai MA   [facility]

## 2025-05-02 ENCOUNTER — TELEPHONE (OUTPATIENT)
Age: 50
End: 2025-05-02

## 2025-05-02 NOTE — TELEPHONE ENCOUNTER
Spoke with  this morning regarding pt cardiac clearance for tooth extraction.     stated that he addendum his office notes from 3/6/25.    Faxed Addendum office notes to Loretta BISWAS at [phone].    Confirmation received and scanned into chart.

## 2025-05-02 NOTE — TELEPHONE ENCOUNTER
The patient's dentist office called to request that we send to a different fax # [phone].  Note was faxed

## 2025-05-02 NOTE — TELEPHONE ENCOUNTER
Leila called to report that the patient is scheduled to have his teeth pulled now on Monday and she would like to reschedule his surgery. Advised would notify the office of this and someone would reach out to her with the next steps. Please advise.

## 2025-05-06 ENCOUNTER — TELEPHONE (OUTPATIENT)
Age: 50
End: 2025-05-06

## 2025-05-06 NOTE — TELEPHONE ENCOUNTER
No need for repeat visit. Will clear based on previous exam if there have been no changes with specialist visits

## 2025-05-06 NOTE — TELEPHONE ENCOUNTER
Patient's wife Strang, states patient had his dental procedure and she now wants to schedule his surgery. Warm transferred to [place] at Vascular surgery scheduling.

## 2025-05-06 NOTE — TELEPHONE ENCOUNTER
Patient had pre-op clearance exam with PCP on 4/17/2025 prior to a dental procedure.    Wife states patient is now scheduled for another surgery on 5/30/2025. She asks if PCP would like to see patient again for clearance or if his previous appointment is sufficient to provide medical clearance.    Wife requests return call.

## 2025-05-16 ENCOUNTER — PATIENT MESSAGE (OUTPATIENT)
Age: 50
End: 2025-05-16

## 2025-05-16 ENCOUNTER — PREP FOR PROCEDURE (OUTPATIENT)
Dept: VASCULAR SURGERY | Facility: CLINIC | Age: 50
End: 2025-05-16

## 2025-05-16 DIAGNOSIS — I73.9 SEVERE PERIPHERAL ARTERIAL DISEASE (HCC): Primary | ICD-10-CM

## 2025-05-23 ENCOUNTER — PATIENT MESSAGE (OUTPATIENT)
Dept: VASCULAR SURGERY | Facility: CLINIC | Age: 50
End: 2025-05-23

## 2025-05-27 ENCOUNTER — ANESTHESIA EVENT (OUTPATIENT)
Dept: PERIOP | Facility: HOSPITAL | Age: 50
DRG: 169 | End: 2025-05-27
Payer: COMMERCIAL

## 2025-05-27 NOTE — PRE-PROCEDURE INSTRUCTIONS
Pre-Surgery Instructions:   Medication Instructions    albuterol (Ventolin HFA) 90 mcg/act inhaler Uses PRN- OK to take day of surgery    atorvastatin (LIPITOR) 40 mg tablet Take day of surgery.    clopidogrel (PLAVIX) 75 mg tablet Take day of surgery. No hold per vascular office    ergocalciferol (VITAMIN D2) 50,000 units Weekly     Spoke with pts wife via phone.    Medication instructions for day of surgery reviewed. Please take all instructed medications with only a sip of water.       You will receive a call one business day prior to surgery with an arrival time and hospital directions. If your surgery is scheduled on a Monday, the hospital will be calling you on the Friday prior to your surgery. If you have not heard from anyone by 8pm, please call the hospital supervisor through the hospital  at [phone]. ([place] [phone] or [place] [phone]).    Do not eat or drink anything after midnight the night before your surgery, including candy, mints, lifesavers, or chewing gum. Do not drink alcohol 24hrs before your surgery. Try not to smoke at least 24hrs before your surgery.       Follow the pre surgery showering instructions as listed in the “My Surgical Experience Booklet” or otherwise provided by your surgeon's office. Do not use a blade to shave the surgical area 1 week before surgery. It is okay to use a clean electric clippers up to 24 hours before surgery. Do not apply any lotions, creams, including makeup, cologne, deodorant, or perfumes after showering on the day of your surgery. Do not use dry shampoo, hair spray, hair gel, or any type of hair products.     No contact lenses, eye make-up, or artificial eyelashes. Remove nail polish, including gel polish, and any artificial, gel, or acrylic nails if possible. Remove all jewelry including rings and body piercing jewelry.     Wear causal clothing that is easy to take on and off. Consider your type of surgery.    Keep any valuables,  jewelry, piercings at home. Please bring any specially ordered equipment (sling, braces) if indicated.    Arrange for a responsible person to drive you to and from the hospital on the day of your surgery. Please confirm the visitor policy for the day of your procedure when you receive your phone call with an arrival time.     Call the surgeon's office with any new illnesses, exposures, or additional questions prior to surgery.    Please reference your “My Surgical Experience Booklet” for additional information to prepare for your upcoming surgery.

## 2025-05-28 ENCOUNTER — TELEPHONE (OUTPATIENT)
Age: 50
End: 2025-05-28

## 2025-05-28 NOTE — TELEPHONE ENCOUNTER
Paperwork was completed.. spoke to Leila she will  paperwork. Made a copy to scan, put original in reception area for .

## 2025-06-12 ENCOUNTER — APPOINTMENT (OUTPATIENT)
Dept: LAB | Facility: HOSPITAL | Age: 50
End: 2025-06-12
Payer: COMMERCIAL

## 2025-06-12 ENCOUNTER — LAB REQUISITION (OUTPATIENT)
Dept: LAB | Facility: HOSPITAL | Age: 50
End: 2025-06-12
Payer: COMMERCIAL

## 2025-06-12 DIAGNOSIS — I73.9 PERIPHERAL VASCULAR DISEASE, UNSPECIFIED (HCC): ICD-10-CM

## 2025-06-12 DIAGNOSIS — I73.9 SEVERE PERIPHERAL ARTERIAL DISEASE (HCC): ICD-10-CM

## 2025-06-12 LAB
ABO GROUP BLD: NORMAL
ANION GAP SERPL CALCULATED.3IONS-SCNC: 4 MMOL/L (ref 4–13)
BLD GP AB SCN SERPL QL: NEGATIVE
BUN SERPL-MCNC: 14 MG/DL (ref 5–25)
CALCIUM SERPL-MCNC: 8.8 MG/DL (ref 8.4–10.2)
CHLORIDE SERPL-SCNC: 108 MMOL/L (ref 96–108)
CO2 SERPL-SCNC: 27 MMOL/L (ref 21–32)
CREAT SERPL-MCNC: 0.82 MG/DL (ref 0.6–1.3)
ERYTHROCYTE [DISTWIDTH] IN BLOOD BY AUTOMATED COUNT: 13.2 % (ref 11.6–15.1)
GFR SERPL CREATININE-BSD FRML MDRD: 105 ML/MIN/1.73SQ M
GLUCOSE P FAST SERPL-MCNC: 88 MG/DL (ref 65–99)
HCT VFR BLD AUTO: 36.4 % (ref 36.5–49.3)
HGB BLD-MCNC: 11.9 G/DL (ref 12–17)
MCH RBC QN AUTO: 30.5 PG (ref 26.8–34.3)
MCHC RBC AUTO-ENTMCNC: 32.7 G/DL (ref 31.4–37.4)
MCV RBC AUTO: 93 FL (ref 82–98)
PLATELET # BLD AUTO: 152 THOUSANDS/UL (ref 149–390)
PMV BLD AUTO: 9.8 FL (ref 8.9–12.7)
POTASSIUM SERPL-SCNC: 4.1 MMOL/L (ref 3.5–5.3)
RBC # BLD AUTO: 3.9 MILLION/UL (ref 3.88–5.62)
RH BLD: POSITIVE
SODIUM SERPL-SCNC: 139 MMOL/L (ref 135–147)
SPECIMEN EXPIRATION DATE: NORMAL
WBC # BLD AUTO: 5.67 THOUSAND/UL (ref 4.31–10.16)

## 2025-06-12 PROCEDURE — 85027 COMPLETE CBC AUTOMATED: CPT

## 2025-06-12 PROCEDURE — 86901 BLOOD TYPING SEROLOGIC RH(D): CPT | Performed by: STUDENT IN AN ORGANIZED HEALTH CARE EDUCATION/TRAINING PROGRAM

## 2025-06-12 PROCEDURE — 36415 COLL VENOUS BLD VENIPUNCTURE: CPT

## 2025-06-12 PROCEDURE — 86923 COMPATIBILITY TEST ELECTRIC: CPT

## 2025-06-12 PROCEDURE — 86900 BLOOD TYPING SEROLOGIC ABO: CPT | Performed by: STUDENT IN AN ORGANIZED HEALTH CARE EDUCATION/TRAINING PROGRAM

## 2025-06-12 PROCEDURE — 86850 RBC ANTIBODY SCREEN: CPT | Performed by: STUDENT IN AN ORGANIZED HEALTH CARE EDUCATION/TRAINING PROGRAM

## 2025-06-12 PROCEDURE — 80048 BASIC METABOLIC PNL TOTAL CA: CPT

## 2025-06-13 ENCOUNTER — TELEPHONE (OUTPATIENT)
Age: 50
End: 2025-06-13

## 2025-06-13 NOTE — TELEPHONE ENCOUNTER
Spoke with patient's wife and tried to relay recommendations. Patient's wife is questioning if a doctor of provider can give a call stating there was a finding on the blood work.

## 2025-06-13 NOTE — TELEPHONE ENCOUNTER
Wife called asking for results of his labs as he is going in for surgery and saw that they were abnormal and wants to make sure everything is ok

## 2025-06-13 NOTE — TELEPHONE ENCOUNTER
Spoke to pts wife, advised that these Labs were ordered by DR. [facility] and she should reach out to them for results.

## 2025-06-13 NOTE — TELEPHONE ENCOUNTER
Received call from pt's wife.  Pt has surgery with Dr. Griffin 6/18.  Pt completed blood work yesterday, CBC came back abnormal.  She is asking for provider to review and asking if pt is okay to proceed with surgery.

## 2025-06-17 ENCOUNTER — TELEPHONE (OUTPATIENT)
Age: 50
End: 2025-06-17

## 2025-06-17 NOTE — TELEPHONE ENCOUNTER
Scan  in  fax  records   from    [facility]   court  of  [place]

## 2025-06-18 ENCOUNTER — HOSPITAL ENCOUNTER (INPATIENT)
Facility: HOSPITAL | Age: 50
LOS: 5 days | Discharge: HOME HEALTH | DRG: 169 | End: 2025-06-23
Attending: STUDENT IN AN ORGANIZED HEALTH CARE EDUCATION/TRAINING PROGRAM | Admitting: STUDENT IN AN ORGANIZED HEALTH CARE EDUCATION/TRAINING PROGRAM
Payer: COMMERCIAL

## 2025-06-18 ENCOUNTER — ANESTHESIA (OUTPATIENT)
Dept: PERIOP | Facility: HOSPITAL | Age: 50
DRG: 169 | End: 2025-06-18
Payer: COMMERCIAL

## 2025-06-18 DIAGNOSIS — I73.9 SEVERE PERIPHERAL ARTERIAL DISEASE (HCC): Primary | ICD-10-CM

## 2025-06-18 DIAGNOSIS — Z97.8 FOLEY CATHETER IN PLACE: ICD-10-CM

## 2025-06-18 DIAGNOSIS — I70.229 REST PAIN OF LOWER EXTREMITY DUE TO ATHEROSCLEROSIS (HCC): ICD-10-CM

## 2025-06-18 PROBLEM — I70.228 REST PAIN OF LEFT UPPER EXTREMITY DUE TO ATHEROSCLEROSIS (HCC): Status: ACTIVE | Noted: 2024-02-21

## 2025-06-18 LAB
ANION GAP SERPL CALCULATED.3IONS-SCNC: 8 MMOL/L (ref 4–13)
BASE EXCESS BLDA CALC-SCNC: -2 MMOL/L (ref -2–3)
BASE EXCESS BLDA CALC-SCNC: -5 MMOL/L (ref -2–3)
BASOPHILS # BLD MANUAL: 0 THOUSAND/UL (ref 0–0.1)
BASOPHILS NFR MAR MANUAL: 0 % (ref 0–1)
BUN SERPL-MCNC: 11 MG/DL (ref 5–25)
CA-I BLD-SCNC: 1.01 MMOL/L (ref 1.12–1.32)
CA-I BLD-SCNC: 1.02 MMOL/L (ref 1.12–1.32)
CA-I BLD-SCNC: 1.15 MMOL/L (ref 1.12–1.32)
CALCIUM SERPL-MCNC: 8.2 MG/DL (ref 8.4–10.2)
CHLORIDE SERPL-SCNC: 106 MMOL/L (ref 96–108)
CO2 SERPL-SCNC: 23 MMOL/L (ref 21–32)
CREAT SERPL-MCNC: 0.7 MG/DL (ref 0.6–1.3)
EOSINOPHIL # BLD MANUAL: 0 THOUSAND/UL (ref 0–0.4)
EOSINOPHIL NFR BLD MANUAL: 0 % (ref 0–6)
ERYTHROCYTE [DISTWIDTH] IN BLOOD BY AUTOMATED COUNT: 13.2 % (ref 11.6–15.1)
GFR SERPL CREATININE-BSD FRML MDRD: 112 ML/MIN/1.73SQ M
GLUCOSE SERPL-MCNC: 141 MG/DL (ref 65–140)
GLUCOSE SERPL-MCNC: 145 MG/DL (ref 65–140)
GLUCOSE SERPL-MCNC: 91 MG/DL (ref 65–140)
HCO3 BLDA-SCNC: 19.3 MMOL/L (ref 22–28)
HCO3 BLDA-SCNC: 22.5 MMOL/L (ref 22–28)
HCT VFR BLD AUTO: 33.2 % (ref 36.5–49.3)
HCT VFR BLD CALC: 31 % (ref 36.5–49.3)
HCT VFR BLD CALC: 33 % (ref 36.5–49.3)
HGB BLD-MCNC: 10.9 G/DL (ref 12–17)
HGB BLDA-MCNC: 10.5 G/DL (ref 12–17)
HGB BLDA-MCNC: 11.2 G/DL (ref 12–17)
KCT BLD-ACNC: 113 SEC (ref 89–137)
KCT BLD-ACNC: 225 SEC (ref 89–137)
KCT BLD-ACNC: 262 SEC (ref 89–137)
KCT BLD-ACNC: 262 SEC (ref 89–137)
LYMPHOCYTES # BLD AUTO: 1.24 THOUSAND/UL (ref 0.6–4.47)
LYMPHOCYTES # BLD AUTO: 9 % (ref 14–44)
MAGNESIUM SERPL-MCNC: 1.4 MG/DL (ref 1.9–2.7)
MCH RBC QN AUTO: 30.2 PG (ref 26.8–34.3)
MCHC RBC AUTO-ENTMCNC: 32.8 G/DL (ref 31.4–37.4)
MCV RBC AUTO: 92 FL (ref 82–98)
MONOCYTES # BLD AUTO: 0.28 THOUSAND/UL (ref 0–1.22)
MONOCYTES NFR BLD: 2 % (ref 4–12)
NEUTROPHILS # BLD MANUAL: 12.27 THOUSAND/UL (ref 1.85–7.62)
NEUTS BAND NFR BLD MANUAL: 1 % (ref 0–8)
NEUTS SEG NFR BLD AUTO: 88 % (ref 43–75)
PCO2 BLD: 20 MMOL/L (ref 21–32)
PCO2 BLD: 24 MMOL/L (ref 21–32)
PCO2 BLD: 33.2 MM HG (ref 36–44)
PCO2 BLD: 37.2 MM HG (ref 36–44)
PH BLD: 7.37 [PH] (ref 7.35–7.45)
PH BLD: 7.39 [PH] (ref 7.35–7.45)
PHOSPHATE SERPL-MCNC: 2.5 MG/DL (ref 2.7–4.5)
PLATELET # BLD AUTO: 163 THOUSANDS/UL (ref 149–390)
PLATELET BLD QL SMEAR: ADEQUATE
PMV BLD AUTO: 10.5 FL (ref 8.9–12.7)
PO2 BLD: 177 MM HG (ref 75–129)
PO2 BLD: 246 MM HG (ref 75–129)
POIKILOCYTOSIS BLD QL SMEAR: PRESENT
POTASSIUM BLD-SCNC: 3.2 MMOL/L (ref 3.5–5.3)
POTASSIUM BLD-SCNC: 3.6 MMOL/L (ref 3.5–5.3)
POTASSIUM SERPL-SCNC: 3.9 MMOL/L (ref 3.5–5.3)
RBC # BLD AUTO: 3.61 MILLION/UL (ref 3.88–5.62)
RBC MORPH BLD: PRESENT
SAO2 % BLD FROM PO2: 100 % (ref 60–85)
SAO2 % BLD FROM PO2: 100 % (ref 60–85)
SODIUM BLD-SCNC: 141 MMOL/L (ref 136–145)
SODIUM BLD-SCNC: 141 MMOL/L (ref 136–145)
SODIUM SERPL-SCNC: 137 MMOL/L (ref 135–147)
SPECIMEN SOURCE: ABNORMAL
SPECIMEN SOURCE: NORMAL
TARGETS BLD QL SMEAR: PRESENT
WBC # BLD AUTO: 13.79 THOUSAND/UL (ref 4.31–10.16)

## 2025-06-18 PROCEDURE — 80048 BASIC METABOLIC PNL TOTAL CA: CPT

## 2025-06-18 PROCEDURE — 82330 ASSAY OF CALCIUM: CPT

## 2025-06-18 PROCEDURE — 04CL0ZZ EXTIRPATION OF MATTER FROM LEFT FEMORAL ARTERY, OPEN APPROACH: ICD-10-PCS | Performed by: STUDENT IN AN ORGANIZED HEALTH CARE EDUCATION/TRAINING PROGRAM

## 2025-06-18 PROCEDURE — 85007 BL SMEAR W/DIFF WBC COUNT: CPT

## 2025-06-18 PROCEDURE — 82803 BLOOD GASES ANY COMBINATION: CPT

## 2025-06-18 PROCEDURE — 85014 HEMATOCRIT: CPT

## 2025-06-18 PROCEDURE — 84295 ASSAY OF SERUM SODIUM: CPT

## 2025-06-18 PROCEDURE — 84132 ASSAY OF SERUM POTASSIUM: CPT

## 2025-06-18 PROCEDURE — 82947 ASSAY GLUCOSE BLOOD QUANT: CPT

## 2025-06-18 PROCEDURE — 85347 COAGULATION TIME ACTIVATED: CPT

## 2025-06-18 PROCEDURE — 35665 BPG ILIOFEMORAL: CPT | Performed by: STUDENT IN AN ORGANIZED HEALTH CARE EDUCATION/TRAINING PROGRAM

## 2025-06-18 PROCEDURE — 99222 1ST HOSP IP/OBS MODERATE 55: CPT

## 2025-06-18 PROCEDURE — 85027 COMPLETE CBC AUTOMATED: CPT

## 2025-06-18 PROCEDURE — 83735 ASSAY OF MAGNESIUM: CPT

## 2025-06-18 PROCEDURE — C1781 MESH (IMPLANTABLE): HCPCS | Performed by: STUDENT IN AN ORGANIZED HEALTH CARE EDUCATION/TRAINING PROGRAM

## 2025-06-18 PROCEDURE — 04UL0KZ SUPPLEMENT LEFT FEMORAL ARTERY WITH NONAUTOLOGOUS TISSUE SUBSTITUTE, OPEN APPROACH: ICD-10-PCS | Performed by: STUDENT IN AN ORGANIZED HEALTH CARE EDUCATION/TRAINING PROGRAM

## 2025-06-18 PROCEDURE — 041J0JJ BYPASS LEFT EXTERNAL ILIAC ARTERY TO LEFT FEMORAL ARTERY WITH SYNTHETIC SUBSTITUTE, OPEN APPROACH: ICD-10-PCS | Performed by: STUDENT IN AN ORGANIZED HEALTH CARE EDUCATION/TRAINING PROGRAM

## 2025-06-18 PROCEDURE — NC001 PR NO CHARGE

## 2025-06-18 PROCEDURE — 84100 ASSAY OF PHOSPHORUS: CPT

## 2025-06-18 PROCEDURE — NC001 PR NO CHARGE: Performed by: STUDENT IN AN ORGANIZED HEALTH CARE EDUCATION/TRAINING PROGRAM

## 2025-06-18 PROCEDURE — C1768 GRAFT, VASCULAR: HCPCS | Performed by: STUDENT IN AN ORGANIZED HEALTH CARE EDUCATION/TRAINING PROGRAM

## 2025-06-18 DEVICE — XENOSURE BIOLOGIC PATCH, 0.8CM X 8CM, EIFU
Type: IMPLANTABLE DEVICE | Site: ARTERIAL | Status: FUNCTIONAL
Brand: XENOSURE BIOLOGIC PATCH

## 2025-06-18 DEVICE — INTERGARD KNITTED STRAIGHT COLLAGEN COATED VASCULAR GRAFT
Type: IMPLANTABLE DEVICE | Site: ARTERIAL | Status: FUNCTIONAL
Brand: INTERGARD

## 2025-06-18 RX ORDER — FENTANYL CITRATE/PF 50 MCG/ML
25 SYRINGE (ML) INJECTION
Status: COMPLETED | OUTPATIENT
Start: 2025-06-18 | End: 2025-06-18

## 2025-06-18 RX ORDER — LABETALOL HYDROCHLORIDE 5 MG/ML
10 INJECTION, SOLUTION INTRAVENOUS EVERY 6 HOURS PRN
Status: DISCONTINUED | OUTPATIENT
Start: 2025-06-18 | End: 2025-06-21

## 2025-06-18 RX ORDER — HEPARIN SODIUM 5000 [USP'U]/ML
5000 INJECTION, SOLUTION INTRAVENOUS; SUBCUTANEOUS EVERY 8 HOURS SCHEDULED
Status: DISCONTINUED | OUTPATIENT
Start: 2025-06-18 | End: 2025-06-23 | Stop reason: HOSPADM

## 2025-06-18 RX ORDER — ASPIRIN 81 MG/1
81 TABLET ORAL DAILY
Status: DISCONTINUED | OUTPATIENT
Start: 2025-06-19 | End: 2025-06-23 | Stop reason: HOSPADM

## 2025-06-18 RX ORDER — OXYCODONE HYDROCHLORIDE 5 MG/1
5 TABLET ORAL EVERY 4 HOURS PRN
Refills: 0 | Status: DISCONTINUED | OUTPATIENT
Start: 2025-06-18 | End: 2025-06-23

## 2025-06-18 RX ORDER — PHENYLEPHRINE HCL IN 0.9% NACL 1 MG/10 ML
SYRINGE (ML) INTRAVENOUS AS NEEDED
Status: DISCONTINUED | OUTPATIENT
Start: 2025-06-18 | End: 2025-06-18

## 2025-06-18 RX ORDER — CALCIUM GLUCONATE 20 MG/ML
2 INJECTION, SOLUTION INTRAVENOUS ONCE
Status: COMPLETED | OUTPATIENT
Start: 2025-06-18 | End: 2025-06-18

## 2025-06-18 RX ORDER — CEFAZOLIN SODIUM 2 G/50ML
SOLUTION INTRAVENOUS AS NEEDED
Status: DISCONTINUED | OUTPATIENT
Start: 2025-06-18 | End: 2025-06-18

## 2025-06-18 RX ORDER — DEXAMETHASONE SODIUM PHOSPHATE 10 MG/ML
INJECTION, SOLUTION INTRAMUSCULAR; INTRAVENOUS AS NEEDED
Status: DISCONTINUED | OUTPATIENT
Start: 2025-06-18 | End: 2025-06-18

## 2025-06-18 RX ORDER — LIDOCAINE HYDROCHLORIDE 10 MG/ML
INJECTION, SOLUTION EPIDURAL; INFILTRATION; INTRACAUDAL; PERINEURAL
Status: DISPENSED
Start: 2025-06-18 | End: 2025-06-18

## 2025-06-18 RX ORDER — HYDROMORPHONE HCL/PF 1 MG/ML
0.5 SYRINGE (ML) INJECTION
Status: COMPLETED | OUTPATIENT
Start: 2025-06-18 | End: 2025-06-18

## 2025-06-18 RX ORDER — CHLORHEXIDINE GLUCONATE ORAL RINSE 1.2 MG/ML
15 SOLUTION DENTAL ONCE
Status: COMPLETED | OUTPATIENT
Start: 2025-06-18 | End: 2025-06-18

## 2025-06-18 RX ORDER — OXYCODONE HYDROCHLORIDE 10 MG/1
10 TABLET ORAL EVERY 4 HOURS PRN
Refills: 0 | Status: DISCONTINUED | OUTPATIENT
Start: 2025-06-18 | End: 2025-06-23

## 2025-06-18 RX ORDER — ROCURONIUM BROMIDE 10 MG/ML
INJECTION, SOLUTION INTRAVENOUS AS NEEDED
Status: DISCONTINUED | OUTPATIENT
Start: 2025-06-18 | End: 2025-06-18

## 2025-06-18 RX ORDER — MIDAZOLAM HYDROCHLORIDE 2 MG/2ML
INJECTION, SOLUTION INTRAMUSCULAR; INTRAVENOUS AS NEEDED
Status: DISCONTINUED | OUTPATIENT
Start: 2025-06-18 | End: 2025-06-18

## 2025-06-18 RX ORDER — FENTANYL CITRATE 50 UG/ML
INJECTION, SOLUTION INTRAMUSCULAR; INTRAVENOUS AS NEEDED
Status: DISCONTINUED | OUTPATIENT
Start: 2025-06-18 | End: 2025-06-18

## 2025-06-18 RX ORDER — ACETAMINOPHEN 325 MG/1
975 TABLET ORAL EVERY 8 HOURS SCHEDULED
Status: DISCONTINUED | OUTPATIENT
Start: 2025-06-18 | End: 2025-06-23 | Stop reason: HOSPADM

## 2025-06-18 RX ORDER — METOPROLOL TARTRATE 1 MG/ML
INJECTION, SOLUTION INTRAVENOUS AS NEEDED
Status: DISCONTINUED | OUTPATIENT
Start: 2025-06-18 | End: 2025-06-18

## 2025-06-18 RX ORDER — ONDANSETRON 2 MG/ML
4 INJECTION INTRAMUSCULAR; INTRAVENOUS EVERY 6 HOURS PRN
Status: DISCONTINUED | OUTPATIENT
Start: 2025-06-18 | End: 2025-06-23 | Stop reason: HOSPADM

## 2025-06-18 RX ORDER — PROPOFOL 10 MG/ML
INJECTION, EMULSION INTRAVENOUS AS NEEDED
Status: DISCONTINUED | OUTPATIENT
Start: 2025-06-18 | End: 2025-06-18

## 2025-06-18 RX ORDER — MAGNESIUM SULFATE HEPTAHYDRATE 40 MG/ML
4 INJECTION, SOLUTION INTRAVENOUS ONCE
Status: COMPLETED | OUTPATIENT
Start: 2025-06-18 | End: 2025-06-18

## 2025-06-18 RX ORDER — SENNOSIDES 8.6 MG
1 TABLET ORAL DAILY
Status: DISCONTINUED | OUTPATIENT
Start: 2025-06-18 | End: 2025-06-23 | Stop reason: HOSPADM

## 2025-06-18 RX ORDER — ONDANSETRON 2 MG/ML
4 INJECTION INTRAMUSCULAR; INTRAVENOUS ONCE AS NEEDED
Status: DISCONTINUED | OUTPATIENT
Start: 2025-06-18 | End: 2025-06-18 | Stop reason: HOSPADM

## 2025-06-18 RX ORDER — LIDOCAINE HYDROCHLORIDE 10 MG/ML
INJECTION, SOLUTION EPIDURAL; INFILTRATION; INTRACAUDAL; PERINEURAL AS NEEDED
Status: DISCONTINUED | OUTPATIENT
Start: 2025-06-18 | End: 2025-06-18

## 2025-06-18 RX ORDER — ALBUTEROL SULFATE 90 UG/1
2 INHALANT RESPIRATORY (INHALATION) EVERY 6 HOURS PRN
Status: DISCONTINUED | OUTPATIENT
Start: 2025-06-18 | End: 2025-06-23 | Stop reason: HOSPADM

## 2025-06-18 RX ORDER — HYDRALAZINE HYDROCHLORIDE 20 MG/ML
5 INJECTION INTRAMUSCULAR; INTRAVENOUS EVERY 6 HOURS PRN
Status: DISCONTINUED | OUTPATIENT
Start: 2025-06-18 | End: 2025-06-21

## 2025-06-18 RX ORDER — PROTAMINE SULFATE 10 MG/ML
INJECTION, SOLUTION INTRAVENOUS AS NEEDED
Status: DISCONTINUED | OUTPATIENT
Start: 2025-06-18 | End: 2025-06-18

## 2025-06-18 RX ORDER — HYDROMORPHONE HCL/PF 1 MG/ML
0.5 SYRINGE (ML) INJECTION EVERY 4 HOURS PRN
Status: DISCONTINUED | OUTPATIENT
Start: 2025-06-18 | End: 2025-06-21

## 2025-06-18 RX ORDER — ACETAMINOPHEN 10 MG/ML
INJECTION, SOLUTION INTRAVENOUS AS NEEDED
Status: DISCONTINUED | OUTPATIENT
Start: 2025-06-18 | End: 2025-06-18

## 2025-06-18 RX ORDER — ATORVASTATIN CALCIUM 40 MG/1
40 TABLET, FILM COATED ORAL
Status: DISCONTINUED | OUTPATIENT
Start: 2025-06-18 | End: 2025-06-23 | Stop reason: HOSPADM

## 2025-06-18 RX ORDER — HYDROMORPHONE HCL/PF 1 MG/ML
SYRINGE (ML) INJECTION AS NEEDED
Status: DISCONTINUED | OUTPATIENT
Start: 2025-06-18 | End: 2025-06-18

## 2025-06-18 RX ORDER — LIDOCAINE HYDROCHLORIDE 20 MG/ML
JELLY TOPICAL ONCE
Status: DISCONTINUED | OUTPATIENT
Start: 2025-06-18 | End: 2025-06-19

## 2025-06-18 RX ORDER — ALBUTEROL SULFATE 0.83 MG/ML
2.5 SOLUTION RESPIRATORY (INHALATION) ONCE AS NEEDED
Status: DISCONTINUED | OUTPATIENT
Start: 2025-06-18 | End: 2025-06-18 | Stop reason: HOSPADM

## 2025-06-18 RX ORDER — SODIUM CHLORIDE, SODIUM LACTATE, POTASSIUM CHLORIDE, CALCIUM CHLORIDE 600; 310; 30; 20 MG/100ML; MG/100ML; MG/100ML; MG/100ML
20 INJECTION, SOLUTION INTRAVENOUS CONTINUOUS
Status: DISCONTINUED | OUTPATIENT
Start: 2025-06-18 | End: 2025-06-18

## 2025-06-18 RX ORDER — MAGNESIUM HYDROXIDE 1200 MG/15ML
LIQUID ORAL AS NEEDED
Status: DISCONTINUED | OUTPATIENT
Start: 2025-06-18 | End: 2025-06-18 | Stop reason: HOSPADM

## 2025-06-18 RX ORDER — ONDANSETRON 2 MG/ML
INJECTION INTRAMUSCULAR; INTRAVENOUS AS NEEDED
Status: DISCONTINUED | OUTPATIENT
Start: 2025-06-18 | End: 2025-06-18

## 2025-06-18 RX ORDER — HEPARIN SODIUM 1000 [USP'U]/ML
INJECTION, SOLUTION INTRAVENOUS; SUBCUTANEOUS AS NEEDED
Status: DISCONTINUED | OUTPATIENT
Start: 2025-06-18 | End: 2025-06-18

## 2025-06-18 RX ORDER — SODIUM CHLORIDE 9 MG/ML
INJECTION, SOLUTION INTRAVENOUS CONTINUOUS PRN
Status: DISCONTINUED | OUTPATIENT
Start: 2025-06-18 | End: 2025-06-18

## 2025-06-18 RX ADMIN — HEPARIN SODIUM 5000 UNITS: 1000 INJECTION, SOLUTION INTRAVENOUS; SUBCUTANEOUS at 10:45

## 2025-06-18 RX ADMIN — FENTANYL CITRATE 50 MCG: 50 INJECTION INTRAMUSCULAR; INTRAVENOUS at 09:13

## 2025-06-18 RX ADMIN — NICARDIPINE HYDROCHLORIDE 500 MCG: 2.5 INJECTION, SOLUTION INTRAVENOUS at 13:38

## 2025-06-18 RX ADMIN — PHENYLEPHRINE HYDROCHLORIDE 20 MCG/MIN: 50 INJECTION INTRAVENOUS at 11:10

## 2025-06-18 RX ADMIN — FENTANYL CITRATE 50 MCG: 50 INJECTION INTRAMUSCULAR; INTRAVENOUS at 11:44

## 2025-06-18 RX ADMIN — ATORVASTATIN CALCIUM 40 MG: 40 TABLET, FILM COATED ORAL at 15:59

## 2025-06-18 RX ADMIN — HEPARIN SODIUM 5000 UNITS: 5000 INJECTION INTRAVENOUS; SUBCUTANEOUS at 21:45

## 2025-06-18 RX ADMIN — ONDANSETRON 4 MG: 2 INJECTION INTRAMUSCULAR; INTRAVENOUS at 12:46

## 2025-06-18 RX ADMIN — DEXMEDETOMIDINE HYDROCHLORIDE 4 MCG: 100 INJECTION, SOLUTION INTRAVENOUS at 11:28

## 2025-06-18 RX ADMIN — ACETAMINOPHEN 975 MG: 325 TABLET, FILM COATED ORAL at 21:45

## 2025-06-18 RX ADMIN — FENTANYL CITRATE 50 MCG: 50 INJECTION INTRAMUSCULAR; INTRAVENOUS at 09:22

## 2025-06-18 RX ADMIN — SODIUM CHLORIDE: 0.9 INJECTION, SOLUTION INTRAVENOUS at 12:00

## 2025-06-18 RX ADMIN — HYDROMORPHONE HYDROCHLORIDE 0.5 MG: 1 INJECTION, SOLUTION INTRAMUSCULAR; INTRAVENOUS; SUBCUTANEOUS at 09:27

## 2025-06-18 RX ADMIN — CEFAZOLIN SODIUM 2000 MG: 2 SOLUTION INTRAVENOUS at 12:53

## 2025-06-18 RX ADMIN — NICARDIPINE HYDROCHLORIDE 100 MCG: 2.5 INJECTION, SOLUTION INTRAVENOUS at 09:40

## 2025-06-18 RX ADMIN — HYDROMORPHONE HYDROCHLORIDE 0.5 MG: 1 INJECTION, SOLUTION INTRAMUSCULAR; INTRAVENOUS; SUBCUTANEOUS at 14:38

## 2025-06-18 RX ADMIN — HYDROMORPHONE HYDROCHLORIDE 0.5 MG: 1 INJECTION, SOLUTION INTRAMUSCULAR; INTRAVENOUS; SUBCUTANEOUS at 14:18

## 2025-06-18 RX ADMIN — FENTANYL CITRATE 25 MCG: 50 INJECTION INTRAMUSCULAR; INTRAVENOUS at 14:03

## 2025-06-18 RX ADMIN — HYDROMORPHONE HYDROCHLORIDE 0.5 MG: 1 INJECTION, SOLUTION INTRAMUSCULAR; INTRAVENOUS; SUBCUTANEOUS at 21:45

## 2025-06-18 RX ADMIN — FENTANYL CITRATE 25 MCG: 50 INJECTION INTRAMUSCULAR; INTRAVENOUS at 14:08

## 2025-06-18 RX ADMIN — HYDROMORPHONE HYDROCHLORIDE 0.5 MG: 1 INJECTION, SOLUTION INTRAMUSCULAR; INTRAVENOUS; SUBCUTANEOUS at 12:34

## 2025-06-18 RX ADMIN — ACETAMINOPHEN 975 MG: 325 TABLET, FILM COATED ORAL at 14:13

## 2025-06-18 RX ADMIN — NICARDIPINE HYDROCHLORIDE 100 MCG: 2.5 INJECTION, SOLUTION INTRAVENOUS at 11:31

## 2025-06-18 RX ADMIN — MIDAZOLAM 2 MG: 1 INJECTION INTRAMUSCULAR; INTRAVENOUS at 08:20

## 2025-06-18 RX ADMIN — SODIUM CHLORIDE: 0.9 INJECTION, SOLUTION INTRAVENOUS at 08:38

## 2025-06-18 RX ADMIN — MAGNESIUM SULFATE HEPTAHYDRATE 4 G: 40 INJECTION, SOLUTION INTRAVENOUS at 19:49

## 2025-06-18 RX ADMIN — FENTANYL CITRATE 100 MCG: 50 INJECTION INTRAMUSCULAR; INTRAVENOUS at 08:30

## 2025-06-18 RX ADMIN — NICARDIPINE HYDROCHLORIDE 200 MCG: 2.5 INJECTION, SOLUTION INTRAVENOUS at 11:28

## 2025-06-18 RX ADMIN — HEPARIN SODIUM 3000 UNITS: 1000 INJECTION, SOLUTION INTRAVENOUS; SUBCUTANEOUS at 11:20

## 2025-06-18 RX ADMIN — OXYCODONE HYDROCHLORIDE 10 MG: 10 TABLET ORAL at 23:38

## 2025-06-18 RX ADMIN — SODIUM PHOSPHATE, MONOBASIC, MONOHYDRATE AND SODIUM PHOSPHATE, DIBASIC, ANHYDROUS 12 MMOL: 142; 276 INJECTION, SOLUTION INTRAVENOUS at 22:44

## 2025-06-18 RX ADMIN — NICARDIPINE HYDROCHLORIDE 200 MCG: 2.5 INJECTION, SOLUTION INTRAVENOUS at 13:41

## 2025-06-18 RX ADMIN — NICARDIPINE HYDROCHLORIDE 4 MG/HR: 2.5 INJECTION, SOLUTION INTRAVENOUS at 09:30

## 2025-06-18 RX ADMIN — METOPROLOL TARTRATE 2 MG: 1 INJECTION, SOLUTION INTRAVENOUS at 09:37

## 2025-06-18 RX ADMIN — HYDRALAZINE HYDROCHLORIDE 5 MG: 20 INJECTION, SOLUTION INTRAMUSCULAR; INTRAVENOUS at 17:22

## 2025-06-18 RX ADMIN — PROTAMINE SULFATE 50 MG: 10 INJECTION, SOLUTION INTRAVENOUS at 12:30

## 2025-06-18 RX ADMIN — ROCURONIUM 20 MG: 50 INJECTION, SOLUTION INTRAVENOUS at 10:04

## 2025-06-18 RX ADMIN — CHLORHEXIDINE GLUCONATE 15 ML: 1.2 SOLUTION ORAL at 07:14

## 2025-06-18 RX ADMIN — HYDROMORPHONE HYDROCHLORIDE 0.5 MG: 1 INJECTION, SOLUTION INTRAMUSCULAR; INTRAVENOUS; SUBCUTANEOUS at 14:28

## 2025-06-18 RX ADMIN — FENTANYL CITRATE 50 MCG: 50 INJECTION INTRAMUSCULAR; INTRAVENOUS at 09:45

## 2025-06-18 RX ADMIN — ROCURONIUM 10 MG: 50 INJECTION, SOLUTION INTRAVENOUS at 11:57

## 2025-06-18 RX ADMIN — Medication 200 MCG: at 08:30

## 2025-06-18 RX ADMIN — FENTANYL CITRATE 25 MCG: 50 INJECTION INTRAMUSCULAR; INTRAVENOUS at 14:13

## 2025-06-18 RX ADMIN — HYDROMORPHONE HYDROCHLORIDE 0.5 MG: 1 INJECTION, SOLUTION INTRAMUSCULAR; INTRAVENOUS; SUBCUTANEOUS at 13:41

## 2025-06-18 RX ADMIN — NICARDIPINE HYDROCHLORIDE 200 MCG: 2.5 INJECTION, SOLUTION INTRAVENOUS at 09:44

## 2025-06-18 RX ADMIN — LABETALOL HYDROCHLORIDE 10 MG: 5 INJECTION, SOLUTION INTRAVENOUS at 18:32

## 2025-06-18 RX ADMIN — NICARDIPINE HYDROCHLORIDE 200 MCG: 2.5 INJECTION, SOLUTION INTRAVENOUS at 09:31

## 2025-06-18 RX ADMIN — NICARDIPINE HYDROCHLORIDE 200 MCG: 2.5 INJECTION, SOLUTION INTRAVENOUS at 09:47

## 2025-06-18 RX ADMIN — FENTANYL CITRATE 50 MCG: 50 INJECTION INTRAMUSCULAR; INTRAVENOUS at 10:06

## 2025-06-18 RX ADMIN — SUGAMMADEX 200 MG: 100 INJECTION, SOLUTION INTRAVENOUS at 13:25

## 2025-06-18 RX ADMIN — PROPOFOL 140 MG: 10 INJECTION, EMULSION INTRAVENOUS at 08:30

## 2025-06-18 RX ADMIN — HYDROMORPHONE HYDROCHLORIDE 0.5 MG: 1 INJECTION, SOLUTION INTRAMUSCULAR; INTRAVENOUS; SUBCUTANEOUS at 09:35

## 2025-06-18 RX ADMIN — OXYCODONE HYDROCHLORIDE 10 MG: 10 TABLET ORAL at 15:59

## 2025-06-18 RX ADMIN — ROCURONIUM 20 MG: 50 INJECTION, SOLUTION INTRAVENOUS at 09:13

## 2025-06-18 RX ADMIN — DEXMEDETOMIDINE HYDROCHLORIDE 8 MCG: 100 INJECTION, SOLUTION INTRAVENOUS at 09:48

## 2025-06-18 RX ADMIN — NICARDIPINE HYDROCHLORIDE 200 MCG: 2.5 INJECTION, SOLUTION INTRAVENOUS at 11:33

## 2025-06-18 RX ADMIN — FENTANYL CITRATE 50 MCG: 50 INJECTION INTRAMUSCULAR; INTRAVENOUS at 11:30

## 2025-06-18 RX ADMIN — HYDROMORPHONE HYDROCHLORIDE 0.5 MG: 1 INJECTION, SOLUTION INTRAMUSCULAR; INTRAVENOUS; SUBCUTANEOUS at 15:12

## 2025-06-18 RX ADMIN — CALCIUM GLUCONATE 2 G: 20 INJECTION, SOLUTION INTRAVENOUS at 17:25

## 2025-06-18 RX ADMIN — ACETAMINOPHEN 1000 MG: 10 INJECTION INTRAVENOUS at 09:08

## 2025-06-18 RX ADMIN — NICARDIPINE HYDROCHLORIDE 500 MCG: 2.5 INJECTION, SOLUTION INTRAVENOUS at 13:31

## 2025-06-18 RX ADMIN — ROCURONIUM 50 MG: 50 INJECTION, SOLUTION INTRAVENOUS at 08:30

## 2025-06-18 RX ADMIN — LIDOCAINE HYDROCHLORIDE 50 MG: 10 INJECTION, SOLUTION EPIDURAL; INFILTRATION; INTRACAUDAL at 08:30

## 2025-06-18 RX ADMIN — SODIUM CHLORIDE, SODIUM LACTATE, POTASSIUM CHLORIDE, AND CALCIUM CHLORIDE: .6; .31; .03; .02 INJECTION, SOLUTION INTRAVENOUS at 11:48

## 2025-06-18 RX ADMIN — FENTANYL CITRATE 100 MCG: 50 INJECTION INTRAMUSCULAR; INTRAVENOUS at 13:47

## 2025-06-18 RX ADMIN — CEFAZOLIN SODIUM 2000 MG: 2 SOLUTION INTRAVENOUS at 08:53

## 2025-06-18 RX ADMIN — SODIUM CHLORIDE, SODIUM LACTATE, POTASSIUM CHLORIDE, AND CALCIUM CHLORIDE: .6; .31; .03; .02 INJECTION, SOLUTION INTRAVENOUS at 08:20

## 2025-06-18 RX ADMIN — DEXMEDETOMIDINE HYDROCHLORIDE 8 MCG: 100 INJECTION, SOLUTION INTRAVENOUS at 09:53

## 2025-06-18 RX ADMIN — FENTANYL CITRATE 25 MCG: 50 INJECTION INTRAMUSCULAR; INTRAVENOUS at 13:57

## 2025-06-18 RX ADMIN — HEPARIN SODIUM 6000 UNITS: 1000 INJECTION, SOLUTION INTRAVENOUS; SUBCUTANEOUS at 10:36

## 2025-06-18 RX ADMIN — DEXAMETHASONE SODIUM PHOSPHATE 5 MG: 10 INJECTION, SOLUTION INTRAMUSCULAR; INTRAVENOUS at 09:17

## 2025-06-18 RX ADMIN — NICARDIPINE HYDROCHLORIDE 400 MCG: 2.5 INJECTION, SOLUTION INTRAVENOUS at 13:28

## 2025-06-18 RX ADMIN — HYDROMORPHONE HYDROCHLORIDE 0.5 MG: 1 INJECTION, SOLUTION INTRAMUSCULAR; INTRAVENOUS; SUBCUTANEOUS at 16:39

## 2025-06-18 NOTE — ANESTHESIA POSTPROCEDURE EVALUATION
Post-Op Assessment Note    CV Status:  Stable  Pain Score: 3    Pain management: satisfactory to patient    Multimodal analgesia used between 6 hours prior to anesthesia start to PACU discharge    Mental Status:  Alert and awake   Hydration Status:  Euvolemic   PONV Controlled:  Controlled   Airway Patency:  Patent  Two or more mitigation strategies used for obstructive sleep apnea   Post Op Vitals Reviewed: Yes    No anethesia notable event occurred.    Staff: CRNA           Last Filed PACU Vitals:  Vitals Value Taken Time   Temp 97.5 °F (36.4 °C) 06/18/25 13:57   Pulse 99    /85    Resp 16    SpO2 100

## 2025-06-18 NOTE — ANESTHESIA PREPROCEDURE EVALUATION
Procedure:  LEFT ILIAC TO COMMON FEMORAL ARTERY BYPASS (Left: Abdomen)  ENDARTERECTOMY ARTERIAL FEMORAL (Left: Leg Lower)    Relevant Problems   ANESTHESIA (within normal limits)      CARDIO   (+) WEAVER (dyspnea on exertion)   (+) Severe peripheral arterial disease (HCC)   (-) MI (myocardial infarction) (HCC)      ENDO (within normal limits)      GI/HEPATIC  NPO confirmed  BMI 18.1   (+) GERD (gastroesophageal reflux disease)      /RENAL (within normal limits)      NEURO/PSYCH (within normal limits)   (-) CVA (cerebral vascular accident) (HCC)   (-) Seizures (HCC)      PULMONARY   (+) WEAVER (dyspnea on exertion)   (-) Sleep apnea   (-) URI (upper respiratory infection)     Allergies   Allergen Reactions    Pollen Extract Other (See Comments)     Watery eyes     Social History[1]    Current Outpatient Medications   Medication Instructions    albuterol (Ventolin HFA) 90 mcg/act inhaler 2 puffs, Inhalation, Every 6 hours PRN    atorvastatin (LIPITOR) 40 mg, Oral, Daily    clopidogrel (PLAVIX) 75 mg, Oral, Daily    ergocalciferol (VITAMIN D2) 50,000 Units, Oral, Weekly    nicotine (NICODERM CQ) 7 mg/24hr TD 24 hr patch 1 patch, Transdermal, Every 24 hours     Lab Results   Component Value Date    WBC 5.67 06/12/2025    HGB 11.9 (L) 06/12/2025    HCT 36.4 (L) 06/12/2025     06/12/2025    SODIUM 139 06/12/2025    K 4.1 06/12/2025     06/12/2025    CO2 27 06/12/2025    BUN 14 06/12/2025    CREATININE 0.82 06/12/2025    GLUC 77 02/20/2025    HGBA1C 4.8 02/22/2021    AST 21 03/20/2025    ALT 24 03/20/2025    ALKPHOS 81 03/20/2025    TBILI 0.35 03/20/2025    ALB 4.1 03/20/2025    PROTIME 13.5 03/20/2025    PTT 28 02/20/2025    INR 1.00 03/20/2025     Vitals:    06/18/25 0707   BP: 127/61   Pulse: 65   Resp: 16   Temp: 98.1 °F (36.7 °C)   SpO2: 100%     NM stress test 6/18/24  1.  Resting EKG shows slow R wave progression.  No change with Lexiscan  2.  No chest discomfort  3.  Normal myocardial perfusion scan  4.   Normal wall motion.  Calculated ejection fraction is 64%     Physical Exam    Airway     Mallampati score: I  TM Distance: >3 FB  Neck ROM: full  Mouth opening: >= 4 cm      Cardiovascular  Rhythm: regular, Rate: normal, No murmurCardiovascular exam normal    Dental   Comment: Denies loose teeth     Pulmonary  Pulmonary exam normal Breath sounds clear to auscultation, No wheezes    Neurological    He appears awake, alert and oriented x3.      Other Findings        Anesthesia Plan  ASA Score- 3     Anesthesia Type- general with ASA Monitors.         Additional Monitors: arterial line.    Airway Plan: Oral ETT.           Plan Factors-Exercise tolerance (METS): <4 METS.Exercise comment: Secondary to claudication pain.    Chart reviewed. EKG reviewed.  Existing labs reviewed. Patient summary reviewed.    Patient is a current smoker.  Patient instructed to abstain from smoking on day of procedure. Patient did not smoke on day of surgery.            Induction- intravenous.    Postoperative Plan- Plan for postoperative opioid use.   Monitoring Plan - Monitoring plan - standard ASA monitoring, train of four monitoring and arterial line kit  Post Operative Pain Plan - non-opiod analgesics and plan for postoperative opioid use    Perioperative Resuscitation Plan - Level 1 - Full Code.       Informed Consent- Anesthetic plan and risks discussed with patient and spouse.  I personally reviewed this patient with the CRNA. Discussed and agreed on the Anesthesia Plan with the CRNA..      NPO Status:  Vitals Value Taken Time   Date of last liquid 06/18/25 06/18/25 07:06   Time of last liquid 0600 06/18/25 07:06   Date of last solid 06/17/25 06/18/25 07:06   Time of last solid 2300 06/18/25 07:06              [1]   Social History  Tobacco Use    Smoking status: Some Days     Current packs/day: 0.50     Average packs/day: 0.7 packs/day for 34.2 years (22.8 ttl pk-yrs)     Types: Cigarettes     Start date: 1/1/1997     Passive  exposure: Yes    Smokeless tobacco: Never   Vaping Use    Vaping status: Never Used   Substance Use Topics    Alcohol use: Not Currently     Comment: holidays    Drug use: Yes     Frequency: 7.0 times per week     Types: Marijuana

## 2025-06-18 NOTE — H&P
Vascular Surgery H&P Note     HPI:    Erik Cordon is a 49 y.o. male who presents for L ilio-femoral bypass and left CFA endarterectomy. No changes in health since he was seen in clinic last.        Creatinine   Date Value Ref Range Status   06/12/2025 0.82 0.60 - 1.30 mg/dL Final     Comment:     Standardized to IDMS reference method   04/17/2025 0.91 0.60 - 1.30 mg/dL Final     Comment:     Standardized to IDMS reference method   03/20/2025 0.80 0.60 - 1.30 mg/dL Final     Comment:     Standardized to IDMS reference method   02/20/2025 1.06 0.60 - 1.30 mg/dL Final     Comment:     Standardized to IDMS reference method   01/15/2025 0.85 0.60 - 1.30 mg/dL Final     Comment:     Standardized to IDMS reference method     WBC   Date Value Ref Range Status   06/12/2025 5.67 4.31 - 10.16 Thousand/uL Final   04/17/2025 7.65 4.31 - 10.16 Thousand/uL Final   03/20/2025 5.12 4.31 - 10.16 Thousand/uL Final   02/20/2025 5.58 4.31 - 10.16 Thousand/uL Final   01/15/2025 6.33 4.31 - 10.16 Thousand/uL Final     Hemoglobin   Date Value Ref Range Status   06/12/2025 11.9 (L) 12.0 - 17.0 g/dL Final   04/17/2025 13.0 12.0 - 17.0 g/dL Final   03/20/2025 12.3 12.0 - 17.0 g/dL Final   02/20/2025 13.4 12.0 - 17.0 g/dL Final   01/15/2025 12.3 12.0 - 17.0 g/dL Final        Objective:  Vital Signs:  Temp:  [98.1 °F (36.7 °C)] 98.1 °F (36.7 °C)  HR:  [65] 65  BP: (127)/(61) 127/61  Resp:  [16] 16  SpO2:  [100 %] 100 %     Review of Systems  Pertinent items are noted in HPI.     Physical Exam  General: male, alert/cooperative on exam.    HEENT: normocephalic, atraumatic   Cardiovascular: hemodynamically stable   Chest/Lungs: chest rise equal bilaterally   Abdomen: Soft, ND, NT   Extremities: Pulse Exam:  Right femoral pulse  Non-palpable left femoral pulse   Skin: warm and dry   Neuro: no gross deficits      Assessment/Plan:  - Continue with above listed procedure         Past Medical History[1]  Past Surgical History[2]   Family  History[3]   Social Connections: Unknown (6/18/2024)    Received from [facility]     How often do you feel lonely or isolated from those around you? (Adult - for ages 18 years and over): Not on file     Allergies[4]  Prior to Admission medications    Medication Sig Start Date End Date Taking? Authorizing Provider   atorvastatin (LIPITOR) 40 mg tablet Take 1 tablet (40 mg total) by mouth daily 4/9/25  Yes Mandy Ramirez PA-C   clopidogrel (PLAVIX) 75 mg tablet Take 1 tablet (75 mg total) by mouth daily 4/7/25  Yes Lucy Werner PA-C   ergocalciferol (VITAMIN D2) 50,000 units Take 1 capsule (50,000 Units total) by mouth once a week 3/11/25  Yes Erendira Falk DO   albuterol (Ventolin HFA) 90 mcg/act inhaler Inhale 2 puffs every 6 (six) hours as needed for wheezing 2/5/25   Sabino Maki MD   nicotine (NICODERM CQ) 7 mg/24hr TD 24 hr patch Place 1 patch on the skin over 24 hours every 24 hours  Patient not taking: Reported on 3/6/2025 2/5/25   Sabino Maki MD        Review of Ancillary Data:  I have personally reviewed the patient's history, labs, and imaging.            [1]   Past Medical History:  Diagnosis Date    Anxiety 2019    Chronic pain disorder     to back down to knees    Difficulty walking 2023    GERD (gastroesophageal reflux disease)     Shortness of breath     with ambulation   [2]   Past Surgical History:  Procedure Laterality Date    LUMBAR EPIDURAL INJECTION      pain shot    MULTIPLE TOOTH EXTRACTIONS     [3]   Family History  Problem Relation Name Age of Onset    No Known Problems Mother      No Known Problems Father      No Known Problems Sister      No Known Problems Brother      No Known Problems Maternal Grandmother      No Known Problems Maternal Grandfather      No Known Problems Paternal Grandmother      No Known Problems Paternal Grandfather      No Known Problems Maternal Aunt      No Known Problems Maternal Uncle      No Known Problems  Paternal Aunt      No Known Problems Paternal Uncle      No Known Problems Cousin     [4]   Allergies  Allergen Reactions    Pollen Extract Other (See Comments)     Watery eyes

## 2025-06-18 NOTE — OP NOTE
VASCULAR AND ENDOVASCULAR SURGERY OP NOTE     DATE:  - 06/18/25      PREOPERATIVE DIAGNOSIS:    - Rest pain of left lower extremity     POSTOPERATIVE DIAGNOSIS:   - SAME     PROCEDURE PERFORMED:   - Left iliofemoral bypass with 8 mm rifampin soaked Dacron graft  - Left iliofemoral endarterectomy with bovine pericardium patch angioplasty     ATTENDING SURGEON:  - Willie Griffin MD     RESIDENT SURGEON(S):  - Surgeons and Role:     * Willie Griffin MD - Primary     * Neil Lai MD - Assisting     * Guy Stewart MD - Assisting                             BLOOD LOSS:  - 200 mL     ANESTHESIA:  - General endotracheal anesthesia      OPERATIVE FINDINGS:   - Heavily diseased left common femoral artery. Good luminal gain following endarterectomy. Successful iliofemoral bypass. Left DP/PT pulses at end of case.       PROCEDURE IN DETAIL:  Informed consent was obtained from the patient prior to proceeding to the operating room.  The patient was then identified in the pre-anesthesia area, then taken to the operating room, placed in the supine position on the operating table, and induced under general endotracheal anesthesia. The patient was correctly positioned, padded at all pressure points. The abdomen and LLE were then prepared and draped in a sterile fashion.  A pre-operative timeout was performed. Preoperative antibiotics were administered at this time.       #15 blade was used to create a vertical left paramedian incision on the abdomen.  Dissection was carried down through the subcutaneous tissue with electrocautery.  The fascia of the external oblique and anterior decussation of the internal oblique were divided.  The rectus muscle was mobilized laterally to expose the posterior accusation of the internal oblique fascia.  We began mobilizing the peritoneum below the semilunar line from lateral to medial working proximally.  The peritoneum was then bluntly dissected away from the rectus sheath and  the posterior decussation was divided with electrocautery.  During the dissection, 3 small holes were made in the peritoneum, which were repaired with Vicryl suture.  We continued mobilizing medially identifying the ureter and left common/external iliac arteries.  A fixed retractor was put in place and the left common iliac artery was circumferentially dissected and controlled with Vesseloops.    A #15 blade was then used to create a vertical incision overlying the left groin.  Dissection was deepened with electrocautery.  The inguinal ligament was identified and cleared medially and laterally of subcutaneous tissue.  The femoral sheath was entered atraumatically the common femoral artery was identified.  The circumflex femoral veins were divided between silk sutures.  We extended dissection distally to the PFA and SFA.  The vessels were then controlled with Silastic Vesseloops.  A tunnel was then created between the groin and abdominal incisions.    Patient was then systemically heparinized.  ACT was maintained greater than 250 for the duration of the case.    The femoral vessels were controlled with Vesseloops and atraumatic clamps.  A #11 blade was used to create a arteriotomy on the CFA.  This was extended with Beauchamp scissors.  An elevator was used to perform an iliofemoral endarterectomy.  SFA and PFA endpoints were tacked in place with 6-0 Prolene suture.  The endarterectomy bed was copiously irrigated and cleared of debris.  The bovine pericardium patch was then brought onto the field and sewn in place with running 5-0 Prolene suture.  Prior to completion of the endarterectomy, the artery was backbled.    We then turned attention to completion of the bypass.  An atraumatic vascular clamp was placed on the common femoral artery for control.  A #11 blade was used to create a vertical arteriotomy, which was extended with Beauchamp scissors.  The 8 mm rifampin soaked dacryon was brought onto the field.  This was  beveled and trimmed to length.  The anastomosis was then completed with running 4-0 Prolene suture.  The anastomosis was backbled prior to completion.  The graft was pressurized and brought through the previously created tunnel ensuring that no twisting or kinking occurred.  The femoral vessels were again controlled and a #11 blade was used to create a vertical patchotomy in the bovine pericardium patch.  This was extended with Beauchamp scissors.  The graft was beveled and trimmed to length.  The anastomosis was created with running 5-0 Prolene suture.  The anastomosis was backbled prior to completion.  Hemostasis was achieved with topical hemostatic agents.    After the bypass was completed, the patient was noted to have left DP/PT pulses (only monophasic signals present preop).  The anterior and posterior Dicky stations of the internal oblique fascia were each closed with running one 0-0 PDS.  The subcutaneous tissue was approximated with 3-0 Vicryl and a 4-0 Monocryl was used to close the skin.  The groin incision was closed in layers of Vicryl suture and the skin was closed with 4-0 Monocryl suture.  Surgical glue and sterile dressings were applied to the incisions.     The patient tolerated the procedure well.     The patient was transported to the PACU in good condition.

## 2025-06-18 NOTE — LETTER
[facility]  [address]  [place] [address]  Dept: [phone]    June 21, 2025       To Whom it May Concern:    Please excuse Leila Foy from work from 6/19- 7/3 as she is taking care of her  in his recovery from surgical intervention.     If you have any questions or concerns, please don't hesitate to call.         Sincerely,          Fela Clark PA-C

## 2025-06-18 NOTE — CONSULTS
Consultation - Critical Care/ICU   Name: Erik Cordon 49 y.o. male I MRN: [id number]  Unit/Bed#: OR POOL I Date of Admission: 6/18/2025   Date of Service: 6/18/2025 I Hospital Day: 0   Consults  Physician Requesting Evaluation: Willie Griffin MD   Reason for Evaluation / Principal Problem: L ilio-femoral bypass and L CFA endarterectomy       Assessment & Plan   Active Hospital Problems    Diagnosis Date Noted POA    Rest pain of left upper extremity due to atherosclerosis (HCC) 02/21/2024 Yes    Severe peripheral arterial disease (HCC) 02/20/2025 Yes    Nicotine dependence with current use 08/16/2024 Yes    GERD (gastroesophageal reflux disease)  Yes      Resolved Hospital Problems   No resolved problems to display.     Neuro/Psych:  Diagnoses: No active issues  Plan:  Neurovascular checks per vascular protocol  Analgesia: Multimodal. Tylenol, oxy, dilaudid for breakthrough pain     CV:  Diagnoses: POD 0 s/p L ilio-femoral bypass and L CFA endarterectomy   Plan:  Continue ASA, statin  Vascular surgery following  Frequent neurovascular checks  Continuous cardiopulmonary monitoring.   Goal SBP < 160    Pulm:  Diagnoses: No active issues  Plan:  Continuous pulse oximetry. Maintain O2 sat >92%.     GI:  Diagnoses:No active issues    :  Diagnoses: no active issues  Creatinine trend: at baseline  Baseline creatinine: 0.9  Plan:  Trend renal indices  Monitor I/Os.    F/E/N:  Diagnoses: No active issues  Plan:   F:  Fluid resuscitation prn.  E: Monitor and replete electrolytes for Mg >2, Phos >3, K >4.  N: Regular    Heme/Onc:  Diagnoses: No active issues  Plan:  VTE prophylaxis: subq heparin     Endo:  Diagnoses: No active issues  Plan: Insulin: Monitor blood glucose.    ID:  Diagnoses: No active issues  Plan:  Abx: Liza-operative requirements  Monitor fever curve and WBC.    MSK/Skin:  Diagnoses: No active issues  Plan:  PT/OT when appropriate. Encourage OOB and ambulation when appropriate. Local wound  care prn.      LDAs:  Lines - PIV  Drains - none  Airways - none      Disposition: Stepdown Level 1    History of Present Illness   Erik Cordon is a 49 y.o. M hx Severe PAD, tobacco abuse who had recent hospitalization in 2/21 for LLE claudication. Vascular surgery was consulted at that time and he was discharged on statin/plavix. He underwent appropriate pre-operative clearance and he presents to the ICU 6/18 POD #0 s/p L iliofemoral bypass and L CFA endarterectomy      History obtained from chart review and the patient.  Review of Systems: Review of Systems   Constitutional:  Positive for diaphoresis.   HENT: Negative.     Respiratory: Negative.     Cardiovascular: Negative.    Gastrointestinal: Negative.    Genitourinary: Negative.    Musculoskeletal:  Positive for arthralgias.   Skin: Negative.    Neurological: Negative.    Psychiatric/Behavioral: Negative.         Historical Information   Past Medical History:  2019: Anxiety  No date: Chronic pain disorder      Comment:  to back down to knees  2023: Difficulty walking  No date: GERD (gastroesophageal reflux disease)  No date: Shortness of breath      Comment:  with ambulation Past Surgical History:  No date: LUMBAR EPIDURAL INJECTION      Comment:  pain shot  No date: MULTIPLE TOOTH EXTRACTIONS   Current Outpatient Medications   Medication Instructions    albuterol (Ventolin HFA) 90 mcg/act inhaler 2 puffs, Inhalation, Every 6 hours PRN    atorvastatin (LIPITOR) 40 mg, Oral, Daily    clopidogrel (PLAVIX) 75 mg, Oral, Daily    ergocalciferol (VITAMIN D2) 50,000 Units, Oral, Weekly    nicotine (NICODERM CQ) 7 mg/24hr TD 24 hr patch 1 patch, Transdermal, Every 24 hours    Allergies[1]   Social History[2] Family History[3]       Objective :                   Vitals I/O      Most Recent Min/Max in 24hrs   Temp 97.5 °F (36.4 °C) Temp  Min: 97.5 °F (36.4 °C)  Max: 98.1 °F (36.7 °C)   Pulse 65 Pulse  Min: 65  Max: 65   Resp 16 Resp  Min: 16  Max: 16   BP  127/61 BP  Min: 127/61  Max: 127/61   O2 Sat 100 % SpO2  Min: 100 %  Max: 100 %      Intake/Output Summary (Last 24 hours) at 6/18/2025 1420  Last data filed at 6/18/2025 1307  Gross per 24 hour   Intake 2900 ml   Output 1500 ml   Net 1400 ml       Diet Regular; Regular House    Invasive Monitoring   Arterial Line  [place] BP    No data recorded   MAP    No data recorded           Physical Exam   Physical Exam  Eyes:      Pupils: Pupils are equal, round, and reactive to light.   Skin:     General: Skin is warm and dry.   HENT:      Head: Normocephalic and atraumatic.   Cardiovascular:      Rate and Rhythm: Normal rate and regular rhythm.      Pulses: Normal pulses. Pulses are Palpable DP/PT in LLE.      Heart sounds: Normal heart sounds.   Musculoskeletal:      Right lower leg: No edema.      Left lower leg: No edema.      Comments: Incision site CDI on L    Abdominal: General: There is no distension.      Palpations: Abdomen is soft.      Tenderness: There is no abdominal tenderness.   Constitutional:       Appearance: He is well-developed and well-nourished.   Pulmonary:      Effort: Pulmonary effort is normal.      Breath sounds: Normal breath sounds.   Neurological:      General: No focal deficit present.      Mental Status: He is alert and oriented to person, place and time. Mental status is at baseline. He is calm.      Sensory: Sensation is intact.      Motor: gross motor function is at baseline for patient. Strength full and intact in all extremities.   Genitourinary/Anorectal:  Celis present.        Diagnostic Studies        Lab Results: I have reviewed the following results:     Medications:  Scheduled PRN   acetaminophen, 975 mg, Q8H NEIL  [START ON 6/19/2025] aspirin, 81 mg, Daily  atorvastatin, 40 mg, Daily With Dinner  heparin (porcine), 5,000 Units, Q8H NEIL  lidocaine (PF), ,   senna, 1 tablet, Daily      albuterol, 2 puff, Q6H PRN  albuterol, 2.5 mg, Once PRN  HYDROmorphone, 0.5 mg, Q10 Min  PRN  HYDROmorphone, 0.5 mg, Q4H PRN  lactated ringers, 500 mL, Once PRN   And  lactated ringers, 500 mL, Once PRN  lidocaine (PF), ,   ondansetron, 4 mg, Once PRN  ondansetron, 4 mg, Q6H PRN  oxyCODONE, 10 mg, Q4H PRN  oxyCODONE, 5 mg, Q4H PRN  sodium chloride, 500 mL, Once PRN   And  sodium chloride, 500 mL, Once PRN       Continuous    lactated ringers, 20 mL/hr         Labs:   CBC    Recent Labs     06/18/25  0908 06/18/25  1205   HGB 10.5* 11.2*   HCT 31* 33*     BMP    Recent Labs     06/18/25  0908 06/18/25  1205   CO2 24 20*       Coags    No recent results     Additional Electrolytes  Recent Labs     06/18/25  0908 06/18/25  1205   CAIONIZED 1.15 1.01*          Blood Gas    No recent results  No recent results LFTs  No recent results    Infectious  No recent results  Glucose  No recent results            [1]   Allergies  Allergen Reactions    Pollen Extract Other (See Comments)     Watery eyes   [2]   Social History  Tobacco Use    Smoking status: Some Days     Current packs/day: 0.50     Average packs/day: 0.7 packs/day for 34.2 years (22.8 ttl pk-yrs)     Types: Cigarettes     Start date: 1/1/1997     Passive exposure: Yes    Smokeless tobacco: Never   Vaping Use    Vaping status: Never Used   Substance Use Topics    Alcohol use: Not Currently     Comment: holidays    Drug use: Yes     Frequency: 7.0 times per week     Types: Marijuana   [3]   Family History  Problem Relation Name Age of Onset    No Known Problems Mother      No Known Problems Father      No Known Problems Sister      No Known Problems Brother      No Known Problems Maternal Grandmother      No Known Problems Maternal Grandfather      No Known Problems Paternal Grandmother      No Known Problems Paternal Grandfather      No Known Problems Maternal Aunt      No Known Problems Maternal Uncle      No Known Problems Paternal Aunt      No Known Problems Paternal Uncle      No Known Problems Cousin

## 2025-06-18 NOTE — PROGRESS NOTES
"Post-Op Check - Vascular Surgery   Erik Cordon 49 y.o. male MRN: [id number]  Unit/Bed#: [facility] 513-01 Encounter: [id number]    Assessment:  49yoM with LLE rest pain s/p L iliofemoral endarterectomy with bovine patch angioplasty, L iliofemoral bypass with rifampin soaked dacron 6/18 (Dr. Griffin)    Plan:  - Incentive spirometry  - Diet: Regular as tolerated  - PRN analgesia, anti-emetics  - Maintain A-Line for close hemodynamic monitoring; no strict BP goals - normotension with MAPs >65  - I/Os; armenta in place  - ASA/Statin; home Plavix discontinued  - Subcutaneous heparin for VTE Prophylaxis  - q4hr Neurovascular checks  - Appreciate critical care assistance in management    Subjective: Patient with primary complaint of incisional site pain, otherwise denies fevers, chills, chest pain, shortness of breath. Denies new paresthesias or weakness. Voiding. Tolerating diet without nausea.    Vitals:  /78   Pulse 75   Temp 98.1 °F (36.7 °C) (Oral)   Resp (!) 8   Ht 5' 6\" (1.676 m)   Wt 50.8 kg (112 lb)   SpO2 95%   BMI 18.08 kg/m²     Physical Exam:  General appearance: alert and oriented, in no acute distress  Neurologic: No noted focal deficit  Neck: supple, symmetrical, trachea midline  Lungs: Normal work of breathing, no accessory muscle use  Heart: Regular rate  Abdomen: Soft, nondistended abdomen, appropriately tender to palpation at incision sites. Abdominal mepilex c/d/I without strikethrough. L groin mepilex c/d/I, site soft without noted hematoma  Extremities: Bilateral LE M/S intact. LLE compartments soft, compressible, nontender to palpation    Pulse exam:  Fem: Left: 2+  DP: Right: 2+ Left: 2+  PT:  Left: 2+    I/Os:  No intake/output data recorded.  I/O this shift:  In: 2900 [I.V.:2700]  Out: 1650 [Urine:1250; Blood:400]    Invasive Lines/Tubes:  Invasive Devices       Peripheral Intravenous Line  Duration             Peripheral IV Right Forearm -- days    Peripheral IV 06/18/25 Left " Forearm <1 day              Drain  Duration             Urethral Catheter Non-latex 16 Fr. <1 day                    VTE Prophylaxis: Heparin    Brenda Cabello PA-C  6/18/2025

## 2025-06-19 ENCOUNTER — APPOINTMENT (INPATIENT)
Dept: NON INVASIVE DIAGNOSTICS | Facility: HOSPITAL | Age: 50
DRG: 169 | End: 2025-06-19
Payer: COMMERCIAL

## 2025-06-19 ENCOUNTER — DOCUMENTATION (OUTPATIENT)
Dept: VASCULAR SURGERY | Facility: CLINIC | Age: 50
End: 2025-06-19

## 2025-06-19 LAB
ANION GAP SERPL CALCULATED.3IONS-SCNC: 6 MMOL/L (ref 4–13)
BUN SERPL-MCNC: 11 MG/DL (ref 5–25)
CA-I BLD-SCNC: 1.07 MMOL/L (ref 1.12–1.32)
CALCIUM SERPL-MCNC: 8.4 MG/DL (ref 8.4–10.2)
CHLORIDE SERPL-SCNC: 103 MMOL/L (ref 96–108)
CO2 SERPL-SCNC: 26 MMOL/L (ref 21–32)
CREAT SERPL-MCNC: 0.72 MG/DL (ref 0.6–1.3)
ERYTHROCYTE [DISTWIDTH] IN BLOOD BY AUTOMATED COUNT: 13.3 % (ref 11.6–15.1)
GFR SERPL CREATININE-BSD FRML MDRD: 111 ML/MIN/1.73SQ M
GLUCOSE SERPL-MCNC: 95 MG/DL (ref 65–140)
HCT VFR BLD AUTO: 31.2 % (ref 36.5–49.3)
HGB BLD-MCNC: 10.1 G/DL (ref 12–17)
MAGNESIUM SERPL-MCNC: 2.2 MG/DL (ref 1.9–2.7)
MCH RBC QN AUTO: 30.1 PG (ref 26.8–34.3)
MCHC RBC AUTO-ENTMCNC: 32.4 G/DL (ref 31.4–37.4)
MCV RBC AUTO: 93 FL (ref 82–98)
PHOSPHATE SERPL-MCNC: 4.2 MG/DL (ref 2.7–4.5)
PLATELET # BLD AUTO: 143 THOUSANDS/UL (ref 149–390)
PMV BLD AUTO: 10.7 FL (ref 8.9–12.7)
POTASSIUM SERPL-SCNC: 4 MMOL/L (ref 3.5–5.3)
RBC # BLD AUTO: 3.36 MILLION/UL (ref 3.88–5.62)
SODIUM SERPL-SCNC: 135 MMOL/L (ref 135–147)
WBC # BLD AUTO: 10.54 THOUSAND/UL (ref 4.31–10.16)

## 2025-06-19 PROCEDURE — 83735 ASSAY OF MAGNESIUM: CPT

## 2025-06-19 PROCEDURE — 93005 ELECTROCARDIOGRAM TRACING: CPT

## 2025-06-19 PROCEDURE — 84100 ASSAY OF PHOSPHORUS: CPT

## 2025-06-19 PROCEDURE — 97163 PT EVAL HIGH COMPLEX 45 MIN: CPT

## 2025-06-19 PROCEDURE — 82330 ASSAY OF CALCIUM: CPT

## 2025-06-19 PROCEDURE — 93923 UPR/LXTR ART STDY 3+ LVLS: CPT

## 2025-06-19 PROCEDURE — 97167 OT EVAL HIGH COMPLEX 60 MIN: CPT

## 2025-06-19 PROCEDURE — 93923 UPR/LXTR ART STDY 3+ LVLS: CPT | Performed by: SURGERY

## 2025-06-19 PROCEDURE — 99232 SBSQ HOSP IP/OBS MODERATE 35: CPT | Performed by: EMERGENCY MEDICINE

## 2025-06-19 PROCEDURE — 99024 POSTOP FOLLOW-UP VISIT: CPT | Performed by: STUDENT IN AN ORGANIZED HEALTH CARE EDUCATION/TRAINING PROGRAM

## 2025-06-19 PROCEDURE — 85027 COMPLETE CBC AUTOMATED: CPT | Performed by: SURGERY

## 2025-06-19 PROCEDURE — 80048 BASIC METABOLIC PNL TOTAL CA: CPT | Performed by: SURGERY

## 2025-06-19 RX ORDER — FAMOTIDINE 20 MG/1
20 TABLET, FILM COATED ORAL 2 TIMES DAILY
Status: DISCONTINUED | OUTPATIENT
Start: 2025-06-20 | End: 2025-06-23 | Stop reason: HOSPADM

## 2025-06-19 RX ADMIN — ATORVASTATIN CALCIUM 40 MG: 40 TABLET, FILM COATED ORAL at 17:06

## 2025-06-19 RX ADMIN — ACETAMINOPHEN 975 MG: 325 TABLET, FILM COATED ORAL at 21:22

## 2025-06-19 RX ADMIN — HYDROMORPHONE HYDROCHLORIDE 0.5 MG: 1 INJECTION, SOLUTION INTRAMUSCULAR; INTRAVENOUS; SUBCUTANEOUS at 13:43

## 2025-06-19 RX ADMIN — SENNOSIDES 8.6 MG: 8.6 TABLET, FILM COATED ORAL at 08:05

## 2025-06-19 RX ADMIN — HYDROMORPHONE HYDROCHLORIDE 0.5 MG: 1 INJECTION, SOLUTION INTRAMUSCULAR; INTRAVENOUS; SUBCUTANEOUS at 05:58

## 2025-06-19 RX ADMIN — HYDROMORPHONE HYDROCHLORIDE 0.5 MG: 1 INJECTION, SOLUTION INTRAMUSCULAR; INTRAVENOUS; SUBCUTANEOUS at 00:45

## 2025-06-19 RX ADMIN — HYDROMORPHONE HYDROCHLORIDE 0.5 MG: 1 INJECTION, SOLUTION INTRAMUSCULAR; INTRAVENOUS; SUBCUTANEOUS at 20:13

## 2025-06-19 RX ADMIN — OXYCODONE HYDROCHLORIDE 10 MG: 10 TABLET ORAL at 05:00

## 2025-06-19 RX ADMIN — OXYCODONE HYDROCHLORIDE 10 MG: 10 TABLET ORAL at 10:03

## 2025-06-19 RX ADMIN — ASPIRIN 81 MG: 81 TABLET ORAL at 08:06

## 2025-06-19 RX ADMIN — ACETAMINOPHEN 975 MG: 325 TABLET, FILM COATED ORAL at 05:00

## 2025-06-19 RX ADMIN — OXYCODONE HYDROCHLORIDE 10 MG: 10 TABLET ORAL at 17:40

## 2025-06-19 RX ADMIN — ONDANSETRON 4 MG: 2 INJECTION INTRAMUSCULAR; INTRAVENOUS at 17:40

## 2025-06-19 RX ADMIN — OXYCODONE HYDROCHLORIDE 10 MG: 10 TABLET ORAL at 22:37

## 2025-06-19 RX ADMIN — HEPARIN SODIUM 5000 UNITS: 5000 INJECTION INTRAVENOUS; SUBCUTANEOUS at 13:39

## 2025-06-19 RX ADMIN — HEPARIN SODIUM 5000 UNITS: 5000 INJECTION INTRAVENOUS; SUBCUTANEOUS at 05:00

## 2025-06-19 RX ADMIN — HEPARIN SODIUM 5000 UNITS: 5000 INJECTION INTRAVENOUS; SUBCUTANEOUS at 21:22

## 2025-06-19 NOTE — DISCHARGE INSTR - AVS FIRST PAGE
DISCHARGE INSTRUCTIONS  LEG/BYPASS SURGERY    ACTIVITY:   Limit your physical activity to walking for the first week and then increase your activity as tolerated.  If you become short of breath or tired, stop and rest.  You may require help with walking or feel more secure with something to lean on.  Walking up steps and normal activities may be resumed as you feel ready.  Most people tire easily for the first few weeks following leg surgery.  This improves as conditioning returns.  Avoid strenuous activity such as vigorous exercise.  Avoid heavy lifting (do not lift more than 15 pounds) for the first four weeks after surgery.  You should not drive a car for at least two weeks following discharge from the hospital and you are off all narcotic pain medication.  You may ride in a car.    DIET:  Resume your normal diet.  Good nutrition is important for healing of your incision.  If you are discharged on narcotics for pain control, continue taking your stool softeners until you are having regular bowel movements.    INCISION:  You should shower daily.  Wash incision daily with soap and water, but do not rub or scrub the incision; rinse thoroughly and pat dry.  You may have stitches or staples to close your incision and it is okay for these to get wet.  Do not bathe in a tub or swim for the first 4 week following surgery or if you have any open wounds.  It is normal to have swelling or discoloration around the incision.   If increasing redness or pain develops, call our office immediately.  Numbness in the region of the incision may occur following the surgery.  This normally improves over six to twelve months.    You may have surgical glue over your incisions. There are stitches present under the skin which will absorb on their own.   The glue is used to cover the access, assist in closure, and prevent contamination. This adhesive will darken and peel away on its own within one to two weeks. Do not pick at it.    If you  have a groin wound/incision, place a clean dry piece of gauze to cover your groin incision to keep incision clean and dry and prevent your skin from sticking together.  Change gauze daily.  You may have staples or stitches at your incisions.  These will be removed at your follow-up appointment or when they are ready to come out.  If you have a dressing over your surgical site, remove this on the second day after surgery.  If you have foot or leg wounds, please follow your podiatrist/wound care doctor's instructions for care.  If any of your incisions are open and require dressing changes, you will be given instructions for your daily incision care. If you are not able to change the dressings, a visiting nurse will be arranged.    DO NOT put any powders, creams, ointments, or lotions on your incision.    LEG SWELLING: Most patients have noticeable leg swelling after leg surgery. This usually improves within a few weeks. If swelling is present, elevate the leg whenever possible. Avoid sitting with the leg hanging down for prolonged periods of time.  Walking is beneficial.  An ACE bandage or support stocking may be helpful, but this should be discussed with your physician prior to use if you have a bypass.    FOLLOW UP STUDIES:  Doppler ultrasound studies are very important for long-term management.  Your surgeon will arrange this at your first postoperative visit.  Repeat studies are then scheduled every three months for the first year and periodically after this.    FOLLOW UP APPOINTMENTS:  Making and keeping follow up appointments and ultrasound tests are important to your recovery.  If you have difficulty making it to or keeping your follow up appointments, call the office.    If you have increased pain, fever >101.5, increased drainage, redness or a bad smell at your surgery site, new coldness/numbness of your arm or leg, please call us immediately and GO directly to the ER.    Appt w/ Mandy Ramirez PA-C: 7/1/2025  at 3:30pm, [place] office      PLEASE CALL THE OFFICE IF YOU HAVE ANY QUESTIONS  [phone]  [phone] [address], [address], [place] [address]  [address] [place] [address]  [address] [facility], [place] [address]  [address] [facility], [address], [place] [address]  [address] [facility], [address], [place] [address]  [address], [address], [place] [address]  [address] [facility], [address], [address]nd Floor, [place] [address]  [address] [facility], 1st Floor, [address], [place] [address]  [address] [facility] [facility], [place] [address]  [address] [facility], [address], [place] [address]

## 2025-06-19 NOTE — DISCHARGE INSTR - OTHER ORDERS
Incision site:   -- Shower daily, Wash incisions daily w/ soap and water. Pat dry thoroughly.  --Place clean, dry gauze to cover groin incision to keep area clean and dry and to prevent skin- skin contact  -- Some bruising and swelling is normal, monitor for worsening redness, pain, bulging, oozing, or murky drainage. If present, please call vascular office.   -- Do not pick or scrub glue, allow to slough off over the next 1-2 weeks.

## 2025-06-19 NOTE — UTILIZATION REVIEW
Initial Clinical Review    Elective Inpatient surgical procedure  Age/Sex: 49 y.o. male  Surgery Date: 06/18/25   Procedure:   Left iliofemoral bypass with 8 mm rifampin soaked Dacron graft  Left iliofemoral endarterectomy with bovine pericardium patch angioplasty  Anesthesia: General endotracheal anesthesia   Operative Findings:   Heavily diseased left common femoral artery. Good luminal gain following endarterectomy. Successful iliofemoral bypass. Left DP/PT pulses at end of case.     POD#1 Progress Note:  No acute events overnight. Pain is moderately well controlled. Tolerating PO. Appropriate UOP with armenta in place, unable to be removed overnight due to possible armenta malfunction. Denies any LLE pain or M/S deficits.   Plan: Q4H neurovascular checks. Multimodal analgesia. Continue ASA 81 mg daily. Continue atorvastatin 40 mg daily. PRN hydralazine 5 mg Q6H, labetalol 1- mg Q6H PRN for HTN. Maintain MAP >65. DC A line. Will obtain baseline ABIs today. PT/OT, ISB.        Admission Orders: Date/Time/Statement:   Admission Orders (From admission, onward)       Ordered        06/18/25 1329  Inpatient Admission  Once                          Orders Placed This Encounter   Procedures    Inpatient Admission     Standing Status:   Standing     Number of Occurrences:   1     Level of Care:   Critical Care [15]     Estimated length of stay:   More than 2 Midnights     Certification:   I certify that inpatient services are medically necessary for this patient for a duration of greater than two midnights. See H&P and MD Progress Notes for additional information about the patient's course of treatment.     Diet: Regular House  Mobility: Ambulate, activity as tolerated  DVT Prophylaxis: SCD, Heparin    Medications/Pain Control:   Scheduled Medications:  acetaminophen, 975 mg, Oral, Q8H NEIL  aspirin, 81 mg, Oral, Daily  atorvastatin, 40 mg, Oral, Daily With Dinner  heparin (porcine), 5,000 Units, Subcutaneous, Q8H [facility]  senna,  1 tablet, Oral, Daily      Continuous IV Infusions:     PRN Meds:  albuterol, 2 puff, Inhalation, Q6H PRN  hydrALAZINE, 5 mg, Intravenous, Q6H PRN 06/18 x 1  HYDROmorphone, 0.5 mg, Intravenous, Q4H PRN 06/18 x 2, 06/19 x 2  labetalol, 10 mg, Intravenous, Q6H PRN 06/18 x 1  ondansetron, 4 mg, Intravenous, Q6H PRN  oxyCODONE, 10 mg, Oral, Q4H PRN 06/18 x 2, 06/19 x 1  oxyCODONE, 5 mg, Oral, Q4H PRN      Vital Signs (last 3 days)       Date/Time Temp Pulse Resp BP MAP (mmHg) Arterial Line BP MAP SpO2 Calculated FIO2 (%) - Nasal Cannula Nasal Cannula O2 Flow Rate (L/min) O2 Device Cardiac (WDL) Patient Position - Orthostatic VS Brandon Coma Scale Score Pain    06/19/25 0845 -- 84 27 125/58 84 -- -- 99 % -- -- -- -- -- -- --    06/19/25 0800 -- -- -- -- -- -- -- -- -- -- -- -- -- 15 --    06/19/25 0700 98.7 °F (37.1 °C) 71 23 111/55 79 -- -- 98 % -- -- None (Room air) -- Lying -- --    06/19/25 0630 -- 78 26 138/63 91 -- -- 99 % -- -- -- -- -- -- --    06/19/25 0600 -- 88 29 146/81 103 153/62 98 mmHg 100 % -- -- -- -- -- -- --    06/19/25 0530 -- 74 24 103/52 75 -- -- 99 % -- -- -- -- -- -- --    06/19/25 0500 -- 71 15 114/57 80 117/43 72 mmHg 99 % -- -- -- -- -- -- 8    06/19/25 0430 -- 69 15 119/59 85 -- -- 97 % -- -- -- -- -- -- --    06/19/25 0400 97.5 °F (36.4 °C) 77 19 120/49 70 122/51 81 mmHg 97 % -- -- None (Room air) -- Lying 15 No Pain    06/19/25 0315 -- 76 13 122/56 80 126/52 81 mmHg 97 % -- -- -- -- -- -- --    06/19/25 0200 -- 92 41 104/50 72 130/51 82 mmHg 98 % -- -- -- -- Lying -- --    06/19/25 0100 -- 81 19 122/71 91 130/53 83 mmHg 97 % -- -- -- -- Lying -- --    06/19/25 0045 -- -- -- -- -- -- -- -- -- -- -- -- -- -- 8    06/19/25 0000 97.3 °F (36.3 °C) 76 19 132/63 91 137/54 88 mmHg 97 % -- -- -- -- -- 15 8    06/18/25 2338 -- -- -- -- -- -- -- -- -- -- -- -- -- -- 8 06/18/25 2300 -- 67 13 133/62 89 141/57 91 mmHg 98 % -- -- -- -- -- -- --    06/18/25 2259 99 °F (37.2 °C) -- -- -- -- -- -- -- -- --  -- -- -- -- --    06/18/25 2200 -- 80 18 151/66 85 163/68 104 mmHg 98 % -- -- -- -- -- -- --    06/18/25 2145 -- -- -- -- -- -- -- -- -- -- -- -- -- -- 7    06/18/25 2100 -- 90 29 163/72 107 173/72 112 mmHg 98 % -- -- -- -- -- -- --    06/18/25 2000 -- 81 25 140/63 90 148/56 92 mmHg 97 % -- -- None (Room air) -- Lying 15 --    06/18/25 1900 97.6 °F (36.4 °C) 64 13 152/78 108 155/57 93 mmHg 98 % -- -- -- -- -- -- --    06/18/25 1830 -- 72 15 147/75 106 167/60 99 mmHg 96 % -- -- -- -- -- -- --    06/18/25 1815 -- 76 34 147/69 99 162/60 97 mmHg 97 % -- -- -- -- -- -- --    06/18/25 1800 -- 89 22 160/76 109 154/67 98 mmHg 98 % -- -- -- -- -- -- --    06/18/25 1745 -- 85 18 134/72 97 159/61 96 mmHg 98 % -- -- -- -- -- -- --    06/18/25 1730 -- 92 23 133/70 95 153/62 95 mmHg 98 % -- -- -- -- -- -- --    06/18/25 1715 -- 89 15 145/70 101 177/65 103 mmHg 97 % -- -- -- -- -- -- --    06/18/25 1700 -- 75 8 164/78 112 175/67 104 mmHg 95 % -- -- -- -- -- -- --    06/18/25 1645 -- 75 10 155/73 105 175/68 105 mmHg 93 % -- -- -- -- -- -- --    06/18/25 1639 -- -- -- -- -- -- -- -- -- -- -- -- -- -- 7    06/18/25 1630 -- 82 25 138/61 88 146/61 92 mmHg 97 % -- -- -- -- -- -- --    06/18/25 1615 -- 85 21 127/71 94 144/62 93 mmHg 96 % -- -- -- -- -- -- --    06/18/25 1610 -- -- -- -- -- -- -- -- -- -- -- -- -- 15 10 - Worst Possible Pain    06/18/25 1600 98.1 °F (36.7 °C) 97 19 145/77 104 171/77 112 mmHg 98 % -- -- -- -- -- -- --    06/18/25 1559 -- -- -- -- -- -- -- -- -- -- -- -- -- -- 10 - Worst Possible Pain    06/18/25 1530 -- -- 16 -- -- -- -- -- -- -- -- -- -- -- --    06/18/25 1515 -- 82 21 142/69 99 160/59 92 mmHg 95 % -- -- None (Room air) -- -- -- 9 06/18/25 1512 -- -- -- -- -- -- -- -- -- -- -- -- -- -- 9 06/18/25 1500 -- 83 12 150/71 102 169/64 98 mmHg 92 % -- -- None (Room air) [facility] -- 15 --    06/18/25 1445 -- 81 19 139/71 98 168/64 97 mmHg 92 % -- -- -- -- -- -- --    06/18/25 1438 -- 83 21 -- -- -- -- 96 % -- --  None (Room air) -- -- -- 10 - Worst Possible Pain    06/18/25 1430 -- 81 15 143/70 100 -- -- 92 % -- -- -- -- Lying -- No Pain    06/18/25 1428 -- -- -- -- -- -- -- -- -- -- -- -- -- -- 10 - Worst Possible Pain    06/18/25 1418 -- 98 14 156/77 109 -- -- 98 % -- -- -- -- Lying -- 7    06/18/25 1415 -- 90 12 156/77 109 -- -- 92 % -- -- None (Room air) -- Lying -- 7    06/18/25 1413 -- -- -- -- -- -- -- 99 % -- -- -- -- -- -- 8    06/18/25 1408 -- -- -- -- -- -- -- 99 % -- -- -- -- -- -- 8    06/18/25 1403 -- 100 30 -- -- -- -- 99 % -- -- -- -- Lying -- 8    06/18/25 1400 -- 97 20 143/81 104 -- -- 99 % -- -- Nasal cannula -- Lying -- 8    06/18/25 1357 97.5 °F (36.4 °C) 97 16 143/81 104 -- -- 99 % 36 4 L/min Nasal cannula WDL Lying -- 8    06/18/25 0707 98.1 °F (36.7 °C) 65 16 127/61 -- -- -- 100 % -- -- -- -- -- -- --          Weight (last 2 days)       Date/Time Weight    06/18/25 0707 50.8 (112)            Pertinent Labs/Diagnostic Test Results:   Radiology:  VAS SHIREEN and waveform analysis, multiple levels    (Results Pending)     Cardiology:  ECG 12 lead    by Interface, Ris Results In (06/19 0746)        GI:  No orders to display           Results from last 7 days   Lab Units 06/19/25  0456 06/18/25  1626 06/18/25  1205 06/18/25  0908 06/12/25  1241   WBC Thousand/uL 10.54* 13.79*  --   --  5.67   HEMOGLOBIN g/dL 10.1* 10.9*  --   --  11.9*   I STAT HEMOGLOBIN g/dl  --   --  11.2* 10.5*  --    HEMATOCRIT % 31.2* 33.2*  --   --  36.4*   HEMATOCRIT, ISTAT %  --   --  33* 31*  --    PLATELETS Thousands/uL 143* 163  --   --  152   BANDS PCT %  --  1  --   --   --          Results from last 7 days   Lab Units 06/19/25  0456 06/18/25  1626 06/18/25  1205 06/18/25  0908 06/12/25  1241   SODIUM mmol/L 135 137  --   --  139   POTASSIUM mmol/L 4.0 3.9  --   --  4.1   CHLORIDE mmol/L 103 106  --   --  108   CO2 mmol/L 26 23  --   --  27   CO2, I-STAT mmol/L  --   --  20* 24  --    ANION GAP mmol/L 6 8  --   --  4   BUN mg/dL  "11 11  --   --  14   CREATININE mg/dL 0.72 0.70  --   --  0.82   EGFR ml/min/1.73sq m 111 112  --   --  105   CALCIUM mg/dL 8.4 8.2*  --   --  8.8   CALCIUM, IONIZED mmol/L 1.07* 1.02*  --   --   --    CALCIUM, IONIZED, ISTAT mmol/L  --   --  1.01* 1.15  --    MAGNESIUM mg/dL 2.2 1.4*  --   --   --    PHOSPHORUS mg/dL 4.2 2.5*  --   --   --              Results from last 7 days   Lab Units 06/19/25  0456 06/18/25  1626   GLUCOSE RANDOM mg/dL 95 141*             No results found for: \"BETA-HYDROXYBUTYRATE\"           Results from last 7 days   Lab Units 06/18/25  1205 06/18/25  0908   I STAT BASE EXC mmol/L -5* -2   I STAT O2 SAT % 100* 100*   ISTAT PH ART  7.372 7.390   I STAT ART PCO2 mm HG 33.2* 37.2   I STAT ART PO2 mm .0* 246.0*   I STAT ART HCO3 mmol/L 19.3* 22.5                                                     Results from last 7 days   Lab Units 06/18/25  0933   UNIT PRODUCT CODE  [id number]  [id number]   UNIT NUMBER  [id number]  [id number]   UNITABO  A  A   UNITRH  POS  POS   CROSSMATCH  Compatible  Compatible   UNIT DISPENSE STATUS  Crossmatched  Crossmatched   UNIT PRODUCT VOL mL 350  350                                                                           Network Utilization Review Department  ATTENTION: Please call with any questions or concerns to [phone] and carefully listen to the prompts so that you are directed to the right person. All voicemails are confidential.   For Discharge needs, contact Care Management DC Support Team at [phone] opt. [phone]  Send all requests for admission clinical reviews, approved or denied determinations and any other requests to dedicated fax number below belonging to the campus where the patient is receiving treatment. List of dedicated fax numbers for the Facilities:  FACILITY NAME UR FAX NUMBER   ADMISSION DENIALS (Administrative/Medical Necessity) [phone]   DISCHARGE SUPPORT TEAM (NETWORK) [phone]   PARENT CHILD HEALTH " (Maternity/NICU/Pediatrics) [phone]   [facility] [phone]   [facility] [phone]   [facility] [phone]   [facility] [phone]   [facility] [phone]   [facility] [phone]   [facility] [phone]   [facility] [phone]   [facility] [phone]   [facility] [phone]   [facility] [phone]   [facility] [phone]

## 2025-06-19 NOTE — PROGRESS NOTES
Progress Note - Critical Care/ICU   Name: Erik Cordon 49 y.o. male I MRN: [id number]  Unit/Bed#: [facility] 513-01 I Date of Admission: 6/18/2025   Date of Service: 6/19/2025 I Hospital Day: 1      Assessment & Plan   49 y.o. male POD1 s/p L iliofemoral endarterectomy with bovine patch angioplasty, L iliofemoral bypass with rifampin soaked dacron 6/18 (Dr. Griffin).    UO: 3L    WBC: 10.54 from 13.57  Hgb: 10.1 from 10.9   Cr: 0.72 from 0.70    Active Hospital Problems    Diagnosis Date Noted POA    Rest pain of left upper extremity due to atherosclerosis (HCC) 02/21/2024 Yes    Severe peripheral arterial disease (HCC) 02/20/2025 Yes    Nicotine dependence with current use 08/16/2024 Yes    GERD (gastroesophageal reflux disease)  Yes      Resolved Hospital Problems   No resolved problems to display.     Neuro/Psych:  Diagnoses: Post-operative pain  Plan:  Neuro checks Q4H neurovascular checks   Analgesia: Multimodal. NEIL tylenol 975 Q8H, oxy 5/10 for mild moderate pain Q4H PRN, dilaudid 0.5 mg Q4H PRN for breakthrough pain    CV:  Diagnoses: POD1 s/p L iliofemoral endarterectomy with bovine patch angioplasty, L iliofemoral bypass with rifampin soaked dacron 6/18 (Dr. Griffin)   Plan:  Appreciate vascular recommendations   Q4H neurovascular checks   Continue ASA 81 mg daily  Continue atorvastatin 40 mg daily  PRN hydralazine 5 mg Q6H, labetalol 1- mg Q6H PRN for HTN  Continuous cardiopulmonary monitoring. Maintain MAP >65.    Pulm:  Diagnoses: No active issues   Plan:  PRN albuterol inhaler Q6H  Continuous pulse oximetry. Maintain O2 sat >92%.     GI:  Diagnoses: No active issues, Hx of GERD  Plan:  Zofran Q4H PRN for nausea  Bowel regimen: Senna    :  Diagnoses: Celis  Creatinine trend: 0.72 from 0.7  Baseline creatinine: 0.8  Plan:  Urojet ordered, urology consulted  Monitor I/Os.    F/E/N:  Plan:   F: Fluid resuscitation prn.  E: Monitor and replete electrolytes for Mg >2, Phos >3, K >4.  N: Regular  house    Heme/Onc:  Diagnoses: No active issues   Plan:  VTE prophylaxis: SQH    Endo:  Diagnoses: No active issues   Plan:    Monitor blood glucose.    ID:  Diagnoses: No active issues  Plan:  Monitor fever curve and WBC.    MSK/Skin:  Diagnoses: Surgical site  Plan:  Mepilex dressings per vascular surgery  PT/OT when appropriate. Encourage OOB and ambulation when appropriate. Local wound care prn.    LDAs:  Lines - PIV, armenta  Drains - N/A  Airways - N/A      Disposition: Stepdown Level 1    ICU Core Measures     A: Assess, Prevent, and Manage Pain Has pain been assessed? Yes  Need for changes to pain regimen? No   B: Both SAT/SAT  N/A   C: Choice of Sedation RASS Goal: 0 Alert and Calm  Need for changes to sedation or analgesia regimen? No   D: Delirium CAM-ICU: Negative   E: Early Mobility  Plan for early mobility? Yes   F: Family Engagement Plan for family engagement today? Yes       Review of Invasive Devices:    Armenta Plan: Urology consulted        Prophylaxis:  VTE VTE covered by:  heparin (porcine), Subcutaneous, 5,000 Units at 06/19/25 0500       Stress Ulcer  not ordered         24 Hour Events : POD1 s/p L iliofemoral endarterectomy with bovine patch angioplasty, L iliofemoral bypass with rifampin soaked dacron 6/18 (Dr. Griffin)   Subjective   Review of Systems: Review of Systems   Constitutional:  Negative for chills and fever.   HENT:  Negative for congestion.    Respiratory:  Negative for apnea.    Cardiovascular:  Negative for chest pain.   Gastrointestinal:  Negative for abdominal distention.   Musculoskeletal:  Negative for arthralgias.   Neurological:  Negative for facial asymmetry.   Psychiatric/Behavioral:  Negative for agitation.        Objective :                   Vitals I/O      Most Recent Min/Max in 24hrs   Temp 97.5 °F (36.4 °C) Temp  Min: 97.3 °F (36.3 °C)  Max: 99 °F (37.2 °C)   Pulse 77 Pulse  Min: 64  Max: 100   Resp 19 Resp  Min: 8  Max: 41   BP (!) 120/49 BP  Min: 104/50  Max: 164/78    O2 Sat 97 % SpO2  Min: 92 %  Max: 100 %      Intake/Output Summary (Last 24 hours) at 6/19/2025 0624  Last data filed at 6/19/2025 0400  Gross per 24 hour   Intake 3420 ml   Output 3405 ml   Net 15 ml       Diet Regular; Regular House    Invasive Monitoring           Physical Exam   Physical Exam  Eyes:      Extraocular Movements: Extraocular movements intact.   Skin:     General: Skin is warm and dry.   HENT:      Head: Normocephalic and atraumatic.      Nose: No congestion.      Mouth/Throat:      Mouth: Mucous membranes are moist.   Cardiovascular:      Rate and Rhythm: Normal rate.      Pulses: Pulses are Fem: Left: 2+  DP: Right: 2+ Left: 2+  PT:  Left: 2+  .   Abdominal:      Palpations: Abdomen is soft.   Constitutional:       General: He is not in acute distress.     Appearance: He is not toxic-appearing.   Pulmonary:      Effort: Pulmonary effort is normal.   Neurological:      Mental Status: Mental status is at baseline. He is calm. He is not agitated.          Diagnostic Studies        Lab Results: I have reviewed the following results:     Medications:  Scheduled PRN   acetaminophen, 975 mg, Q8H NEIL  aspirin, 81 mg, Daily  atorvastatin, 40 mg, Daily With Dinner  heparin (porcine), 5,000 Units, Q8H NEIL  lidocaine, , Once  senna, 1 tablet, Daily      albuterol, 2 puff, Q6H PRN  hydrALAZINE, 5 mg, Q6H PRN  HYDROmorphone, 0.5 mg, Q4H PRN  labetalol, 10 mg, Q6H PRN  lactated ringers, 500 mL, Once PRN   And  lactated ringers, 500 mL, Once PRN  ondansetron, 4 mg, Q6H PRN  oxyCODONE, 10 mg, Q4H PRN  oxyCODONE, 5 mg, Q4H PRN  sodium chloride, 500 mL, Once PRN   And  sodium chloride, 500 mL, Once PRN       Continuous          Labs:   CBC    Recent Labs     06/18/25  1626 06/19/25  0456   WBC 13.79* 10.54*   HGB 10.9* 10.1*   HCT 33.2* 31.2*    143*   BANDSPCT 1  --      BMP    Recent Labs     06/18/25  1626 06/19/25  0456   SODIUM 137 135   K 3.9 4.0    103   CO2 23 26   AGAP 8 6   BUN 11 11    CREATININE 0.70 0.72   CALCIUM 8.2* 8.4       Coags    No recent results     Additional Electrolytes  Recent Labs     06/18/25  1626 06/19/25  0456   MG 1.4* 2.2   PHOS 2.5* 4.2   CAIONIZED 1.02* 1.07*          Blood Gas    No recent results  No recent results LFTs  No recent results    Infectious  No recent results  Glucose  Recent Labs     06/18/25  1626 06/19/25  0456   GLUC 141* 95

## 2025-06-19 NOTE — PHYSICAL THERAPY NOTE
PHYSICAL THERAPY EVALUATION          Patient Name: Erik Cordon  Today's Date: 6/19/2025 06/19/25 0912   Note Type   Note type Evaluation   Pain Assessment   Pain Assessment Tool FLACC   Pain Location/Orientation Location: Groin;Location: Leg   Pain Rating: FLACC (Rest) - Face 1   Pain Rating: FLACC (Rest) - Legs 0   Pain Rating: FLACC (Rest) - Activity 0   Pain Rating: FLACC (Rest) - Cry 1   Pain Rating: FLACC (Rest) - Consolability 0   Score: FLACC (Rest) 2   Pain Rating: FLACC (Activity) - Face 1   Pain Rating: FLACC (Activity) - Legs 1   Pain Rating: FLACC (Activity) - Activity 1   Pain Rating: FLACC (Activity) - Cry 1   Pain Rating: FLACC (Activity) - Consolability 0   Score: FLACC (Activity) 4   Restrictions/Precautions   Weight Bearing Precautions Per Order No   Other Precautions Chair Alarm;Bed Alarm;Multiple lines;Telemetry;O2;Fall Risk;Pain   Home Living   Type of Home Apartment   Home Layout One level;Able to live on main level with bedroom/bathroom;Performs ADLs on one level  (2FFOS to enter)   Bathroom Shower/Tub Tub/shower unit   Bathroom Toilet Standard   Bathroom Accessibility Accessible   Home Equipment Other (Comment)  (No DME)   Prior Function   Level of [place] Independent with ADLs;Independent with functional mobility   Lives With Spouse   Receives Help From Family   IADLs Independent with driving;Independent with meal prep;Independent with medication management   Falls in the last 6 months 0   Vocational Full time employment   General   Family/Caregiver Present Yes   Cognition   Overall Cognitive Status WFL   Attention Within functional limits   Orientation Level Oriented X4   Memory Within functional limits   Following Commands Follows all commands and directions without difficulty   Comments pt requires VC for redirection   RLE Assessment   RLE Assessment WFL   Strength RLE   RLE Overall  Strength 5/5   LLE Assessment   LLE Assessment WFL   Strength LLE   LLE Overall Strength 4/5   Bed Mobility   Supine to Sit 5  Supervision   Additional items HOB elevated;Bedrails;Increased time required;Verbal cues   Transfers   Sit to Stand 4  Minimal assistance   Additional items Assist x 1;Increased time required;Verbal cues   Stand to Sit 4  Minimal assistance   Additional items Increased time required;Verbal cues   Additional Comments w/ RW   Ambulation/Elevation   Gait pattern Decreased L stance;Foward flexed;Short stride;Excessively slow   Gait Assistance 4  Minimal assist   Additional items Assist x 1;Verbal cues   Assistive Device Rolling walker   Distance 2' to chair   Balance   Static Sitting Fair +   Dynamic Sitting Fair   Static Standing Fair -   Dynamic Standing Poor +   Ambulatory Poor +   Activity Tolerance   Activity Tolerance Patient limited by pain;Patient limited by fatigue   Medical Staff Made Aware DALLAS Luque; SHARON Brunson   Nurse Made Aware Patient appropriate to be mobilized by nsg   Assessment   Prognosis Good   Problem List Decreased strength;Decreased endurance;Impaired balance;Decreased mobility;Decreased safety awareness;Pain   Assessment Pt is a 49 y.o. male seen for PT evaluation s/p admit to [facility]2025 for L iliofemoral bypass and left CFA endarterectomy. Two patient identifiers were used to confirm. Patient admitted with primary diagnosis of rest pain of left upper extremity due to atherosclerosis (HCC) and other active problems including severe peripheral arterial disease (HCC), Nicotine dependence with current use, GERD. Patients current co morbidities affecting treatment include  has a past medical history of Anxiety, Chronic pain disorder, Difficulty walking, GERD (gastroesophageal reflux disease), and Shortness of breath. Patient clinical presentation is high medical complexity secondary to ongoing medical management of diagnosis, pain, decreased activity tolerance  "compared to baseline, increased dependence on more restrictive AD than PTA, fall risk, s/p surgical intervention, continuous pulse ox monitoring. Upon PT evaluation, pt requires S for bed mobility, Min Ax1 for transfer with RW, and Min Ax1 for AMB with RW. Patient presents with pain, impaired static/dynamic balance, gait deviations, decreased safety awareness, decreased endurance/activity tolerance. At conclusion of PT session, patient was left in chair with call bell and phone within reach and chair alarm engaged. PT currently recommending level 3 resources.   Barriers to Discharge None   Goals   Patient Goals \"to have less pain and get home so I can workout\"   STG Expiration Date 07/03/25   Short Term Goal #1 In 14 days pt will complete: 1) Bed mobility skills with I as means to increase safety and independence with as well as reduce caregiver burden 2) Functional transfers with Mod I to promote increased independence, safety, and QOL 3) Ambulate 100' using least restrictive AD with Mod I, without LOB and stable vitals so that patient can negotiate previous living environment safely and promote independence within functional mobility and return to PLOF 4) Improve balance grades by 1/2 grade in order to increase safety with mobility and decrease fall risk 5) Improve LLE strength by 1/2 grade to help increase safety with mobility and decrease fall risk   Plan   Treatment/Interventions ADL retraining;Functional transfer training;LE strengthening/ROM;Therapeutic exercise;Endurance training;Patient/family training;Equipment eval/education;Bed mobility;Gait training;Spoke to nursing;OT   PT Frequency 3-5x/wk   Discharge Recommendation   Rehab Resource Intensity Level, PT III (Minimum Resource Intensity)   Equipment Recommended Walker   Walker Package Recommended Wheeled walker   AM-PAC Basic Mobility Inpatient   Turning in Flat Bed Without Bedrails 4   Lying on Back to Sitting on Edge of Flat Bed Without Bedrails 4 "   Moving Bed to Chair 3   Standing Up From Chair Using Arms 3   Walk in Room 3   Climb 3-5 Stairs With Railing 2   Basic Mobility Inpatient Raw Score 19   Basic Mobility Standardized Score 42.48   [facility] Highest Level Of Mobility   -[facility] Goal 6: Walk 10 steps or more   -HLM Achieved 4: Move to chair/commode   Modified [place] Scale   Modified [place] Scale 4   End of Consult   Patient Position at End of Consult Bedside chair;Bed/Chair alarm activated;All needs within reach     Neeru Castillo, RHINA

## 2025-06-19 NOTE — PROGRESS NOTES
Vascular Nurse Navigator Post Op Education    Met with patient and wife Leila at bedside to introduce myself as Vascular Nurse Navigator and explained my role.  Patient is appropriate and accepting to education. Patient was educated with Review of written materials provided, Teachback, Explanation, Demonstration, and Question & Answer on expectations of post op care and recovery on L extended CFEA, L EZEQUIEL-CFA bypass w/ rifampin soaked dacron. Patient is a smoker  (1/2 ppd x 34 yrs), as such Smoking effects on the lungs, tobacco triggers, and Smoking cessation was reviewed. Education provided to patient and his wife Leila on infection prevention, activity limitations, when to call the office, importance of follow up, and incisional care.  Discharge instruction handout provided to patient to review.  Provided them with a pack of disposable washcloths, a pack of guaze, and a bottle of chlorhexidine for incision care upon discharge.        Tobacco use is a significant patient-modifiable risk factor for this patient’s vascular disease with multiple vascular comorbidities, and a significant risk factor for failure of and complications from any endovascular or surgical interventions.    I explained to the patient the effects of smoking including peripheral artery disease, coronary artery disease, cerebrovascular disease as well as cancer and chronic obstructive pulmonary disease. I asked the patient to stop smoking immediately. It is never too late to quit, and many studies show significant health benefits as well as economical savings after smoking cessation. I offered to the patient nicotine replacement therapy as well as referral to the smoking cessation program and access to the quit line 9-734-UBPTOMK or ambulatory referral to our network smoking cessation program.    Based on our conversation, this patient appears motivated to quit  And declined my offer of nicotine replacement or tobacco cessation medications.   "He stated that he has nicotine patches at home, but is planning on quitting \"cold turkey\".    The patient set a quit date of date of admission.     I spent approximately 5 minutes on tobacco cessation counseling with this patient.    "

## 2025-06-19 NOTE — NURSING NOTE
Attempted to remove armenta cath per order. Pt c/o pain and leaking around catheter. Unable to deflate balloon and resistance met when trying to gently remove cath. Critical care surgery PA made aware. Urology consult placed.

## 2025-06-19 NOTE — OCCUPATIONAL THERAPY NOTE
Occupational Therapy Evaluation     Patient Name: Erik Cordon  Today's Date: 6/19/2025  Problem List  Principal Problem:    Rest pain of left upper extremity due to atherosclerosis (HCC)  Active Problems:    GERD (gastroesophageal reflux disease)    Nicotine dependence with current use    Severe peripheral arterial disease (HCC)    Past Medical History  Past Medical History[1]  Past Surgical History  Past Surgical History[2]        06/19/25 0856   OT Last Visit   OT Visit Date 06/19/25   Note Type   Note type Evaluation   Pain Assessment   Pain Assessment Tool FLACC   Pain Location/Orientation Location: Groin;Location: Leg  (armenta incision)   Effect of Pain on Daily Activities Impacts ability to engage in valued occupations   Hospital Pain Intervention(s) Repositioned;Ambulation/increased activity;Emotional support   Pain Rating: FLACC (Rest) - Face 1   Pain Rating: FLACC (Rest) - Legs 0   Pain Rating: FLACC (Rest) - Activity 0   Pain Rating: FLACC (Rest) - Cry 1   Pain Rating: FLACC (Rest) - Consolability 0   Score: FLACC (Rest) 2   Pain Rating: FLACC (Activity) - Face 1   Pain Rating: FLACC (Activity) - Legs 1   Pain Rating: FLACC (Activity) - Activity 1   Pain Rating: FLACC (Activity) - Cry 1   Pain Rating: FLACC (Activity) - Consolability 0   Score: FLACC (Activity) 4   Restrictions/Precautions   Weight Bearing Precautions Per Order No   Other Precautions Chair Alarm;Bed Alarm;Multiple lines;Telemetry;Fall Risk;Pain  (armenta)   Home Living   Type of Home Apartment  (1 story apartment with 2 flights of stairs to enter)   Home Layout One level;Performs ADLs on one level;Able to live on main level with bedroom/bathroom;Access   Bathroom Shower/Tub Tub/shower unit   Bathroom Toilet Standard   Bathroom Accessibility Accessible   Home Equipment Other (Comment)  (No DME)   Additional Comments Pt reports living with his wife in a 1 story apartment with 2 flights of stairs to enter; no DME PTA   Prior  "Function   Level of [place] Independent with ADLs;Independent with functional mobility;Independent with IADLS   Lives With Spouse   Receives Help From Family   IADLs Independent with driving;Independent with meal prep;Independent with medication management   Falls in the last 6 months 0   Comments (+) driving   Lifestyle   Autonomy PTA, pt was independent in ADLs/IADLs and uses no DME for functional mobility; (+) driving   Reciprocal Relationships Lives with his wife; son   Service to Others Reports previously working for [employer] however reports that currenty he is not working   Intrinsic Gratification Enjoys working out and being active   General   Family/Caregiver Present Yes   Additional General Comments Pt's wife present, supportive and interactive of pt   Subjective   Subjective \"This armenta needs to come out, something is wrong with it.\"   ADL   Where Assessed Chair   Eating Assistance 5  Supervision/Setup   Grooming Assistance 5  Supervision/Setup   UB Bathing Assistance 4  Minimal Assistance   LB Bathing Assistance 4  Minimal Assistance   UB Dressing Assistance 4  Minimal Assistance   LB Dressing Assistance 4  Minimal Assistance   Toileting Assistance  4  Minimal Assistance   Functional Assistance 4  Minimal Assistance   Bed Mobility   Supine to Sit 5  Supervision   Additional items HOB elevated;Bedrails;Increased time required;Verbal cues   Sit to Supine Unable to assess   Additional Comments At end of session, pt left sitting upright in recliner with all functional needs in reach with wife present in room   Transfers   Sit to Stand 4  Minimal assistance   Additional items Assist x 1;Increased time required;Verbal cues   Stand to Sit 4  Minimal assistance   Additional items Assist x 1;Increased time required;Verbal cues   Additional Comments w/ RW for safety and support   Functional Mobility   Functional Mobility 4  Minimal assistance   Additional Comments Pt completed few small steps from EOB <> " "recliner w/ Min A x1 w/ RW for safety and support with verbal cues for proper technique, safety, and RW management   Additional items Rolling walker   Balance   Static Sitting Fair +   Dynamic Sitting Fair   Static Standing Fair -   Dynamic Standing Poor +   Ambulatory Poor +   Activity Tolerance   Activity Tolerance Patient limited by fatigue;Patient limited by pain   Medical Staff Made Aware SHARON Brunson; RHINA Siddiqi   Nurse Made Aware RN cleared and updated   RUE Assessment   RUE Assessment WFL   LUE Assessment   LUE Assessment WFL   Hand Function   Gross Motor Coordination Functional   Fine Motor Coordination Functional   Cognition   Arousal/Participation Alert;Responsive;Arousable;Cooperative   Attention Within functional limits   Orientation Level Oriented X4   Memory Within functional limits   Following Commands Follows one step commands without difficulty   Comments Pt pleasant and cooperative; occupational performance limited by pain in armenta; requires verbal cues for redirection/attention to task for proper technique and safety during functional mobility   Assessment   Limitation Decreased ADL status;Decreased endurance;Decreased self-care trans;Decreased high-level ADLs   Prognosis Fair   Assessment Pt is a 48 yo male who presented to [facility] s/p \"L iliofemoral endarterectomy with bovine patch angioplasty, L iliofemoral bypass with rifampin soaked dacron\" on 6/19. Pt dx w/ rest pain of left upper extremity due to atherosclerosis. Pt  has a past medical history of Anxiety (2019), Chronic pain disorder, Difficulty walking (2023), GERD (gastroesophageal reflux disease), and Shortness of breath. Pt with active OT orders in which OT consulted to assess pt's functional status and occupational performance to determine safe d/c needs. Pt seen with PT to increase safety, decrease fall risk, and maximize functional/occupational performance 2* medical complexity which is a regression from pt's functional baseline. Pt " lives with his wife in a 1 story apartment with 2 flights of stairs to enter. PTA, pt was independent in ADLs/IADLs and uses no DME for functional mobility. (+) driving. Currently, pt performing bed mobility with supervision, functional transfers/mobility w/ Min A x1 w/ RW, and UB/LB ADLs w/ Min A. Pt demonstrates the following limitations/impairments which impact the pt's ability to engage in valued occupations: balance, endurance/activity tolerance, standing tolerance, functional reach, postural/trunk control, strength, safety awareness, and pain. From an OT standpoint, recommend discharge w/ Level 3 resources once medically stable. The patient's raw score on the -PAC Daily Activity Inpatient Short Form is 20. A raw score of greater than or equal to 19 suggests the patient may benefit from discharge to home. Please refer to the recommendation of the Occupational Therapist for safe discharge planning.  Pt would benefit from skilled OT services 2-3x/wk to address acute care needs and underlying performance skills to promote safety, decrease fall risk, and enhance occupational performance to return to [facility]. Goals to be met within the next 10-14 days.   Goals   Patient Goals To get better, to get back to working out, and get back to doing push ups   LTG Time Frame 10-14   Long Term Goal #1 See OT goals listed below   Plan   Treatment Interventions ADL retraining;Functional transfer training;Endurance training;Patient/family training;Equipment evaluation/education;Compensatory technique education;Continued evaluation;Energy conservation;Activityengagement   Goal Expiration Date 07/03/25   OT Frequency 2-3x/wk   Discharge Recommendation   Rehab Resource Intensity Level, OT III (Minimum Resource Intensity)  (Anticipate pending continued functional progress/pain management, will progress to higher levels of independence)   AM-PAC Daily Activity Inpatient   Lower Body Dressing 3   Bathing 3   Toileting 3   Upper Body  Dressing 3   Grooming 4   Eating 4   Daily Activity Raw Score 20   Daily Activity Standardized Score (Calc for Raw Score >=11) 42.03   AM-PAC Applied Cognition Inpatient   Following a Speech/Presentation 3   Understanding Ordinary Conversation 4   Taking Medications 3   Remembering Where Things Are Placed or Put Away 4   Remembering List of 4-5 Errands 4   Taking Care of Complicated Tasks 3   Applied Cognition Raw Score 21   Applied Cognition Standardized Score 44.3   End of Consult   Education Provided Yes;Family or social support of family present for education by provider   Patient Position at End of Consult Bedside chair;Bed/Chair alarm activated;All needs within reach   Nurse Communication Nurse aware of consult     OT GOALS:    Pt will improve functional mobility during ADL/IADL/leisure tasks with Mod I using AE/DME prn.    Pt will improve activity tolerance/functional endurance during ADL/IADL/leisure tasks for at least 20 minutes to improve occupational performance and engagement in valued occupations using AE/DME prn.    Pt will engage in ongoing functional/formal cognitive assessments to assist with safe d/c planning and increase safety during functional tasks.    Pt will participate in simulated IADL management task w/ Mod I to increase independence and engagement in valued occupations w/ good balance/safety.    Pt will improve dynamic standing balance for at least 15 minutes with Mod I during functional tasks to decrease fall risk and improve independence and engagement in ADL/IADL/leisure activities.    Pt will follow 100% of multi-step commands in ADL/IADL/leisure activities to improve functional cognition used in functional daily routines.    Pt will complete functional transfers on and off all surfaces used in daily routines with Mod I for safety to maximize functional/occupational performance.    Pt will complete all bed mobility tasks with Mod I to serve as a prerequisite for EOB/OOB  ADL/IADL/leisure tasks, optimize positioning/comfort, and increase functional independence.    Pt will independently demonstrate good carryover of safety precautions and education/training during ADL/IADL/leisure tasks with energy conservation techniques s/p skilled instruction without verbal cues.    Pt will complete UB ADL tasks with Mod I using AE/DME prn to increase functional independence in ADL/IADL/leisure tasks.    Pt will complete LB ADL tasks with Mod I using AE/DME prn to increase functional independence in ADL/IADL/leisure tasks.     Pt will complete toileting tasks with Mod I and good hygiene/thoroughness using AE/DME prn to increase functional independence.    Pt will independently identify and utilize 2-3 positive coping strategies to enhance overall wellbeing and engagement in valued occupations.    Ene Cardenas MS, OTR/L         [1]   Past Medical History:  Diagnosis Date    Anxiety 2019    Chronic pain disorder     to back down to knees    Difficulty walking 2023    GERD (gastroesophageal reflux disease)     Shortness of breath     with ambulation   [2]   Past Surgical History:  Procedure Laterality Date    LUMBAR EPIDURAL INJECTION      pain shot    MULTIPLE TOOTH EXTRACTIONS

## 2025-06-19 NOTE — PLAN OF CARE
Problem: INFECTION - ADULT  Goal: Absence or prevention of progression during hospitalization  Description: INTERVENTIONS:  - Assess and monitor for signs and symptoms of infection  - Monitor lab/diagnostic results  - Monitor all insertion sites, i.e. indwelling lines, tubes, and drains  - Monitor endotracheal if appropriate and nasal secretions for changes in amount and color  - Orange appropriate cooling/warming therapies per order  - Administer medications as ordered  - Instruct and encourage patient and family to use good hand hygiene technique  - Identify and instruct in appropriate isolation precautions for identified infection/condition  Outcome: Progressing       Problem: SAFETY ADULT  Goal: Maintain or return to baseline ADL function  Description: INTERVENTIONS:  -  Assess patient's ability to carry out ADLs; assess patient's baseline for ADL function and identify physical deficits which impact ability to perform ADLs (bathing, care of mouth/teeth, toileting, grooming, dressing, etc.)  - Assess/evaluate cause of self-care deficits   - Assess range of motion  - Assess patient's mobility; develop plan if impaired  - Assess patient's need for assistive devices and provide as appropriate  - Encourage maximum independence but intervene and supervise when necessary  - Involve family in performance of ADLs  - Assess for home care needs following discharge   - Consider OT consult to assist with ADL evaluation and planning for discharge  - Provide patient education as appropriate  - Monitor functional capacity and physical performance, use of AM PAC & JH-HLM   - Monitor gait, balance and fatigue with ambulation    Outcome: Progressing

## 2025-06-19 NOTE — PLAN OF CARE
"  Problem: OCCUPATIONAL THERAPY ADULT  Goal: Performs self-care activities at highest level of function for planned discharge setting.  See evaluation for individualized goals.  Description: Treatment Interventions: ADL retraining, Functional transfer training, Endurance training, Patient/family training, Equipment evaluation/education, Compensatory technique education, Continued evaluation, Energy conservation, Activityengagement          See flowsheet documentation for full assessment, interventions and recommendations.   Note: Limitation: Decreased ADL status, Decreased endurance, Decreased self-care trans, Decreased high-level ADLs  Prognosis: Fair  Assessment: Pt is a 48 yo male who presented to [facility] s/p \"L iliofemoral endarterectomy with bovine patch angioplasty, L iliofemoral bypass with rifampin soaked dacron\" on 6/19. Pt dx w/ rest pain of left upper extremity due to atherosclerosis. Pt  has a past medical history of Anxiety (2019), Chronic pain disorder, Difficulty walking (2023), GERD (gastroesophageal reflux disease), and Shortness of breath. Pt with active OT orders in which OT consulted to assess pt's functional status and occupational performance to determine safe d/c needs. Pt seen with PT to increase safety, decrease fall risk, and maximize functional/occupational performance 2* medical complexity which is a regression from pt's functional baseline. Pt lives with his wife in a 1 story apartment with 2 flights of stairs to enter. PTA, pt was independent in ADLs/IADLs and uses no DME for functional mobility. (+) driving. Currently, pt performing bed mobility with supervision, functional transfers/mobility w/ Min A x1 w/ RW, and UB/LB ADLs w/ Min A. Pt demonstrates the following limitations/impairments which impact the pt's ability to engage in valued occupations: balance, endurance/activity tolerance, standing tolerance, functional reach, postural/trunk control, strength, safety awareness, and pain. " From an OT standpoint, recommend discharge w/ Level 3 resources once medically stable. The patient's raw score on the AM-PAC Daily Activity Inpatient Short Form is 20. A raw score of greater than or equal to 19 suggests the patient may benefit from discharge to home. Please refer to the recommendation of the Occupational Therapist for safe discharge planning.  Pt would benefit from skilled OT services 2-3x/wk to address acute care needs and underlying performance skills to promote safety, decrease fall risk, and enhance occupational performance to return to [facility]. Goals to be met within the next 10-14 days.     Rehab Resource Intensity Level, OT: III (Minimum Resource Intensity) (Anticipate pending continued functional progress/pain management, will progress to higher levels of independence)

## 2025-06-19 NOTE — PROGRESS NOTES
"Progress Note - Vascular Surgery   Erik Cordon 49 y.o. male MRN: [id number]  Unit/Bed#: [facility] 513-01 Encounter: [id number]    Assessment:  48 yo M w/ L external iliac artery occlusion and LLE rest pain presented electively for LLE bypass.    6/18 L extended CFEA, L EZEQUIEL-CFA bypass w/ rifampin soaked dacron    Plan:  PO and IV pain control PRN  Regular diet  Appreciate Urology for armenta removal  DC A line  Will obtain baseline ABIs today  Continue aspirin and statin, DC home plavix  Encourage ambulation, PT/OT, ISB  Downgrade to SD1 today    ** Please contact the BE Vascular Surgery Resident/AP role for any questions or concerns that might arise     Subjective/Objective    Subjective: No acute events overnight. Pain is moderately well controlled. Tolerating PO. Appropriate UOP with armenta in place, unable to be removed overnight due to possible armenta malfunction. Denies any LLE pain or M/S deficits.    Objective:     Blood pressure 146/81, pulse 88, temperature 97.5 °F (36.4 °C), temperature source Oral, resp. rate (!) 29, height 5' 6\" (1.676 m), weight 50.8 kg (112 lb), SpO2 100%.,Body mass index is 18.08 kg/m².      Intake/Output Summary (Last 24 hours) at 6/19/2025 0659  Last data filed at 6/19/2025 0600  Gross per 24 hour   Intake 3420 ml   Output 3750 ml   Net -330 ml       Invasive Devices       Peripheral Intravenous Line  Duration             Peripheral IV Right Forearm -- days    Peripheral IV 06/18/25 Left Forearm <1 day              Drain  Duration             Urethral Catheter Non-latex 16 Fr. <1 day                    Physical Exam:   GEN: NAD  HEENT: NCAT, EOMI  CV: Mild tachycardia, normotensive. Palpable L DP/PT pulses  Lung: Normal effort, saturating well on room air  Ab: Soft, nondistended. L paramedian incisional dressing c/d/I without signs of hematoma or active bleeding  Extrem: No CCE. L groin incisional dressing c/d/I without appreciable hematoma or signs of active bleeding  Neuro: " A+Ox3, M/S intact    Lab, Imaging and other studies:I have personally reviewed pertinent lab results.     VTE Pharmacologic Prophylaxis: VTE covered by:  heparin (porcine), Subcutaneous, 5,000 Units at 06/19/25 0500     VTE Mechanical Prophylaxis: sequential compression device    Recent Results (from the past 36 hours)   POCT Blood Gas (CG8+)    Collection Time: 06/18/25  9:08 AM   Result Value Ref Range    pH, Art i-STAT 7.390 7.350 - 7.450    pCO2, Art i-STAT 37.2 36.0 - 44.0 mm HG    pO2, ART i-STAT 246.0 (H) 75.0 - 129.0 mm HG    BE, i-STAT -2 -2 - 3 mmol/L    HCO3, Art i-STAT 22.5 22.0 - 28.0 mmol/L    CO2, i-STAT 24 21 - 32 mmol/L    O2 Sat, i-STAT 100 (H) 60 - 85 %    SODIUM, I-STAT 141 136 - 145 mmol/l    Potassium, i-STAT 3.6 3.5 - 5.3 mmol/L    Calcium, Ionized i-STAT 1.15 1.12 - 1.32 mmol/L    Hct, i-STAT 31 (L) 36.5 - 49.3 %    Hgb, i-STAT 10.5 (L) 12.0 - 17.0 g/dl    Glucose, i-STAT 91 65 - 140 mg/dl    Specimen Type ARTERIAL    Prepare Leukoreduced RBC: 2 Units    Collection Time: 06/18/25  9:33 AM   Result Value Ref Range    Unit Product Code L4869C49     Unit Number [id number]     Unit ABO A     Unit RH POS     Crossmatch Compatible     Unit Dispense Status Crossmatched     Unit Product Volume 350 mL    Unit Product Code T2103P66     Unit Number [id number]     Unit ABO A     Unit RH POS     Crossmatch Compatible     Unit Dispense Status Crossmatched     Unit Product Volume 350 mL   POCT activated clotting time    Collection Time: 06/18/25 10:43 AM   Result Value Ref Range    Activated Clotting Time, i-STAT 225 (H) 89 - 137 sec    Specimen Type ARTERIAL    POCT activated clotting time    Collection Time: 06/18/25 11:19 AM   Result Value Ref Range    Activated Clotting Time, i-STAT 262 (H) 89 - 137 sec    Specimen Type ARTERIAL    POCT activated clotting time    Collection Time: 06/18/25 12:00 PM   Result Value Ref Range    Activated Clotting Time, i-STAT 262 (H) 89 - 137 sec    Specimen Type  ARTERIAL    POCT Blood Gas (CG8+)    Collection Time: 06/18/25 12:05 PM   Result Value Ref Range    pH, Art i-STAT 7.372 7.350 - 7.450    pCO2, Art i-STAT 33.2 (L) 36.0 - 44.0 mm HG    pO2, ART i-STAT 177.0 (H) 75.0 - 129.0 mm HG    BE, i-STAT -5 (L) -2 - 3 mmol/L    HCO3, Art i-STAT 19.3 (L) 22.0 - 28.0 mmol/L    CO2, i-STAT 20 (L) 21 - 32 mmol/L    O2 Sat, i-STAT 100 (H) 60 - 85 %    SODIUM, I-STAT 141 136 - 145 mmol/l    Potassium, i-STAT 3.2 (L) 3.5 - 5.3 mmol/L    Calcium, Ionized i-STAT 1.01 (L) 1.12 - 1.32 mmol/L    Hct, i-STAT 33 (L) 36.5 - 49.3 %    Hgb, i-STAT 11.2 (L) 12.0 - 17.0 g/dl    Glucose, i-STAT 145 (H) 65 - 140 mg/dl    Specimen Type ARTERIAL    POCT activated clotting time    Collection Time: 06/18/25 12:53 PM   Result Value Ref Range    Activated Clotting Time, i-STAT 113 89 - 137 sec    Specimen Type ARTERIAL    CBC and differential    Collection Time: 06/18/25  4:26 PM   Result Value Ref Range    WBC 13.79 (H) 4.31 - 10.16 Thousand/uL    RBC 3.61 (L) 3.88 - 5.62 Million/uL    Hemoglobin 10.9 (L) 12.0 - 17.0 g/dL    Hematocrit 33.2 (L) 36.5 - 49.3 %    MCV 92 82 - 98 fL    MCH 30.2 26.8 - 34.3 pg    MCHC 32.8 31.4 - 37.4 g/dL    RDW 13.2 11.6 - 15.1 %    MPV 10.5 8.9 - 12.7 fL    Platelets 163 149 - 390 Thousands/uL   Basic metabolic panel    Collection Time: 06/18/25  4:26 PM   Result Value Ref Range    Sodium 137 135 - 147 mmol/L    Potassium 3.9 3.5 - 5.3 mmol/L    Chloride 106 96 - 108 mmol/L    CO2 23 21 - 32 mmol/L    ANION GAP 8 4 - 13 mmol/L    BUN 11 5 - 25 mg/dL    Creatinine 0.70 0.60 - 1.30 mg/dL    Glucose 141 (H) 65 - 140 mg/dL    Calcium 8.2 (L) 8.4 - 10.2 mg/dL    eGFR 112 ml/min/1.73sq m   Magnesium    Collection Time: 06/18/25  4:26 PM   Result Value Ref Range    Magnesium 1.4 (L) 1.9 - 2.7 mg/dL   Phosphorus    Collection Time: 06/18/25  4:26 PM   Result Value Ref Range    Phosphorus 2.5 (L) 2.7 - 4.5 mg/dL   Calcium, ionized    Collection Time: 06/18/25  4:26 PM   Result  Value Ref Range    Calcium, Ionized 1.02 (L) 1.12 - 1.32 mmol/L   Manual Differential(PHLEBS Do Not Order)    Collection Time: 06/18/25  4:26 PM   Result Value Ref Range    Segmented % 88 (H) 43 - 75 %    Bands % 1 0 - 8 %    Lymphocytes % 9 (L) 14 - 44 %    Monocytes % 2 (L) 4 - 12 %    Eosinophils % 0 0 - 6 %    Basophils % 0 0 - 1 %    Absolute Neutrophils 12.27 (H) 1.85 - 7.62 Thousand/uL    Absolute Lymphocytes 1.24 0.60 - 4.47 Thousand/uL    Absolute Monocytes 0.28 0.00 - 1.22 Thousand/uL    Absolute Eosinophils 0.00 0.00 - 0.40 Thousand/uL    Absolute Basophils 0.00 0.00 - 0.10 Thousand/uL    Total Counted      RBC Morphology Present     Platelet Estimate Adequate Adequate    Poikilocytes Present     Target Cells Present    Basic Metabolic Panel    Collection Time: 06/19/25  4:56 AM   Result Value Ref Range    Sodium 135 135 - 147 mmol/L    Potassium 4.0 3.5 - 5.3 mmol/L    Chloride 103 96 - 108 mmol/L    CO2 26 21 - 32 mmol/L    ANION GAP 6 4 - 13 mmol/L    BUN 11 5 - 25 mg/dL    Creatinine 0.72 0.60 - 1.30 mg/dL    Glucose 95 65 - 140 mg/dL    Calcium 8.4 8.4 - 10.2 mg/dL    eGFR 111 ml/min/1.73sq m   CBC and Platelet    Collection Time: 06/19/25  4:56 AM   Result Value Ref Range    WBC 10.54 (H) 4.31 - 10.16 Thousand/uL    RBC 3.36 (L) 3.88 - 5.62 Million/uL    Hemoglobin 10.1 (L) 12.0 - 17.0 g/dL    Hematocrit 31.2 (L) 36.5 - 49.3 %    MCV 93 82 - 98 fL    MCH 30.1 26.8 - 34.3 pg    MCHC 32.4 31.4 - 37.4 g/dL    RDW 13.3 11.6 - 15.1 %    Platelets 143 (L) 149 - 390 Thousands/uL    MPV 10.7 8.9 - 12.7 fL   Magnesium    Collection Time: 06/19/25  4:56 AM   Result Value Ref Range    Magnesium 2.2 1.9 - 2.7 mg/dL   Phosphorus    Collection Time: 06/19/25  4:56 AM   Result Value Ref Range    Phosphorus 4.2 2.7 - 4.5 mg/dL   Calcium, ionized    Collection Time: 06/19/25  4:56 AM   Result Value Ref Range    Calcium, Ionized 1.07 (L) 1.12 - 1.32 mmol/L

## 2025-06-19 NOTE — PLAN OF CARE
Problem: PHYSICAL THERAPY ADULT  Goal: Performs mobility at highest level of function for planned discharge setting.  See evaluation for individualized goals.  Description: Treatment/Interventions: ADL retraining, Functional transfer training, LE strengthening/ROM, Therapeutic exercise, Endurance training, Patient/family training, Equipment eval/education, Bed mobility, Gait training, Spoke to nursing, OT  Equipment Recommended: Walker       See flowsheet documentation for full assessment, interventions and recommendations.  Outcome: Progressing  Note: Prognosis: Good  Problem List: Decreased strength, Decreased endurance, Impaired balance, Decreased mobility, Decreased safety awareness, Pain  Assessment: Pt is a 49 y.o. male seen for PT evaluation s/p [facility] for L iliofemoral bypass and left CFA endarterectomy. Two patient identifiers were used to confirm. Patient admitted with primary diagnosis of rest pain of left upper extremity due to atherosclerosis (HCC) and other active problems including severe peripheral arterial disease (HCC), Nicotine dependence with current use, GERD. Patients current co morbidities affecting treatment include  has a past medical history of Anxiety, Chronic pain disorder, Difficulty walking, GERD (gastroesophageal reflux disease), and Shortness of breath. Patient clinical presentation is high medical complexity secondary to ongoing medical management of diagnosis, pain, decreased activity tolerance compared to baseline, increased dependence on more restrictive AD than PTA, fall risk, s/p surgical intervention, continuous pulse ox monitoring. Upon PT evaluation, pt requires S for bed mobility, Min Ax1 for transfer with RW, and Min Ax1 for AMB with RW. Patient presents with pain, impaired static/dynamic balance, gait deviations, decreased safety awareness, decreased endurance/activity tolerance. At conclusion of PT session, patient was left in chair with call bell and  phone within reach and chair alarm engaged. PT currently recommending level 3 resources.  Barriers to Discharge: None     Rehab Resource Intensity Level, PT: III (Minimum Resource Intensity)    See flowsheet documentation for full assessment.

## 2025-06-19 NOTE — ANESTHESIA POSTPROCEDURE EVALUATION
Post-Op Assessment Note    CV Status:  Stable  Pain Score: 3    Pain management: satisfactory to patient    Multimodal analgesia used between 6 hours prior to anesthesia start to PACU discharge    Mental Status:  Alert and awake   Hydration Status:  Euvolemic   PONV Controlled:  Controlled   Airway Patency:  Patent  Two or more mitigation strategies used for obstructive sleep apnea   Post Op Vitals Reviewed: Yes    No anethesia notable event occurred.    Staff: CRNA           Last Filed PACU Vitals:  Vitals Value Taken Time   Temp 97.5 °F (36.4 °C) 06/18/25 13:57   Pulse 99    /85    Resp 16    SpO2 100        Modified Alicia:     Vitals Value Taken Time   Activity 2 06/18/25 15:00   Respiration 2 06/18/25 15:00   Circulation 2 06/18/25 15:00   Consciousness 2 06/18/25 15:00   Oxygen Saturation 2 06/18/25 15:00     Modified Alicia Score: 10

## 2025-06-20 PROBLEM — I70.229 REST PAIN OF LOWER EXTREMITY DUE TO ATHEROSCLEROSIS (HCC): Status: ACTIVE | Noted: 2025-06-20

## 2025-06-20 LAB
ABO GROUP BLD BPU: NORMAL
ABO GROUP BLD BPU: NORMAL
ANION GAP SERPL CALCULATED.3IONS-SCNC: 9 MMOL/L (ref 4–13)
ATRIAL RATE: 90 BPM
BPU ID: NORMAL
BPU ID: NORMAL
BUN SERPL-MCNC: 14 MG/DL (ref 5–25)
CALCIUM SERPL-MCNC: 9.1 MG/DL (ref 8.4–10.2)
CHLORIDE SERPL-SCNC: 102 MMOL/L (ref 96–108)
CO2 SERPL-SCNC: 25 MMOL/L (ref 21–32)
CREAT SERPL-MCNC: 0.8 MG/DL (ref 0.6–1.3)
CROSSMATCH: NORMAL
CROSSMATCH: NORMAL
DME PARACHUTE DELIVERY DATE REQUESTED: NORMAL
DME PARACHUTE DELIVERY DATE REQUESTED: NORMAL
DME PARACHUTE DELIVERY NOTE: NORMAL
DME PARACHUTE ITEM DESCRIPTION: NORMAL
DME PARACHUTE ITEM DESCRIPTION: NORMAL
DME PARACHUTE ORDER STATUS: NORMAL
DME PARACHUTE ORDER STATUS: NORMAL
DME PARACHUTE SUPPLIER NAME: NORMAL
DME PARACHUTE SUPPLIER NAME: NORMAL
DME PARACHUTE SUPPLIER PHONE: NORMAL
DME PARACHUTE SUPPLIER PHONE: NORMAL
ERYTHROCYTE [DISTWIDTH] IN BLOOD BY AUTOMATED COUNT: 13.2 % (ref 11.6–15.1)
GFR SERPL CREATININE-BSD FRML MDRD: 106 ML/MIN/1.73SQ M
GLUCOSE SERPL-MCNC: 87 MG/DL (ref 65–140)
HCT VFR BLD AUTO: 32.7 % (ref 36.5–49.3)
HGB BLD-MCNC: 10.7 G/DL (ref 12–17)
MCH RBC QN AUTO: 30.5 PG (ref 26.8–34.3)
MCHC RBC AUTO-ENTMCNC: 32.7 G/DL (ref 31.4–37.4)
MCV RBC AUTO: 93 FL (ref 82–98)
NRBC BLD AUTO-RTO: 0 /100 WBCS
P AXIS: 76 DEGREES
PLATELET # BLD AUTO: 155 THOUSANDS/UL (ref 149–390)
PMV BLD AUTO: 10.9 FL (ref 8.9–12.7)
POTASSIUM SERPL-SCNC: 4.1 MMOL/L (ref 3.5–5.3)
PR INTERVAL: 132 MS
QRS AXIS: 79 DEGREES
QRSD INTERVAL: 78 MS
QT INTERVAL: 322 MS
QTC INTERVAL: 393 MS
RBC # BLD AUTO: 3.51 MILLION/UL (ref 3.88–5.62)
SODIUM SERPL-SCNC: 136 MMOL/L (ref 135–147)
T WAVE AXIS: 79 DEGREES
UNIT DISPENSE STATUS: NORMAL
UNIT DISPENSE STATUS: NORMAL
UNIT PRODUCT CODE: NORMAL
UNIT PRODUCT CODE: NORMAL
UNIT PRODUCT VOLUME: 350 ML
UNIT PRODUCT VOLUME: 350 ML
UNIT RH: NORMAL
UNIT RH: NORMAL
VENTRICULAR RATE: 90 BPM
WBC # BLD AUTO: 8.18 THOUSAND/UL (ref 4.31–10.16)

## 2025-06-20 PROCEDURE — 93010 ELECTROCARDIOGRAM REPORT: CPT | Performed by: INTERNAL MEDICINE

## 2025-06-20 PROCEDURE — 99024 POSTOP FOLLOW-UP VISIT: CPT | Performed by: SURGERY

## 2025-06-20 PROCEDURE — 85027 COMPLETE CBC AUTOMATED: CPT | Performed by: PHYSICIAN ASSISTANT

## 2025-06-20 PROCEDURE — 80048 BASIC METABOLIC PNL TOTAL CA: CPT | Performed by: PHYSICIAN ASSISTANT

## 2025-06-20 RX ORDER — DOCUSATE SODIUM 100 MG/1
100 CAPSULE, LIQUID FILLED ORAL 2 TIMES DAILY
Status: DISCONTINUED | OUTPATIENT
Start: 2025-06-20 | End: 2025-06-23 | Stop reason: HOSPADM

## 2025-06-20 RX ORDER — POLYETHYLENE GLYCOL 3350 17 G/17G
17 POWDER, FOR SOLUTION ORAL DAILY
Status: DISCONTINUED | OUTPATIENT
Start: 2025-06-20 | End: 2025-06-23 | Stop reason: HOSPADM

## 2025-06-20 RX ADMIN — FAMOTIDINE 20 MG: 20 TABLET, FILM COATED ORAL at 18:16

## 2025-06-20 RX ADMIN — SENNOSIDES 8.6 MG: 8.6 TABLET, FILM COATED ORAL at 08:42

## 2025-06-20 RX ADMIN — OXYCODONE HYDROCHLORIDE 10 MG: 10 TABLET ORAL at 05:38

## 2025-06-20 RX ADMIN — HEPARIN SODIUM 5000 UNITS: 5000 INJECTION INTRAVENOUS; SUBCUTANEOUS at 13:13

## 2025-06-20 RX ADMIN — FAMOTIDINE 20 MG: 20 TABLET, FILM COATED ORAL at 08:43

## 2025-06-20 RX ADMIN — ACETAMINOPHEN 975 MG: 325 TABLET, FILM COATED ORAL at 13:14

## 2025-06-20 RX ADMIN — ACETAMINOPHEN 975 MG: 325 TABLET, FILM COATED ORAL at 20:35

## 2025-06-20 RX ADMIN — HYDROMORPHONE HYDROCHLORIDE 0.5 MG: 1 INJECTION, SOLUTION INTRAMUSCULAR; INTRAVENOUS; SUBCUTANEOUS at 08:42

## 2025-06-20 RX ADMIN — DOCUSATE SODIUM 100 MG: 100 CAPSULE, LIQUID FILLED ORAL at 18:16

## 2025-06-20 RX ADMIN — ASPIRIN 81 MG: 81 TABLET ORAL at 08:42

## 2025-06-20 RX ADMIN — OXYCODONE HYDROCHLORIDE 10 MG: 10 TABLET ORAL at 18:21

## 2025-06-20 RX ADMIN — ATORVASTATIN CALCIUM 40 MG: 40 TABLET, FILM COATED ORAL at 18:16

## 2025-06-20 RX ADMIN — HEPARIN SODIUM 5000 UNITS: 5000 INJECTION INTRAVENOUS; SUBCUTANEOUS at 05:38

## 2025-06-20 RX ADMIN — POLYETHYLENE GLYCOL 3350 17 G: 17 POWDER, FOR SOLUTION ORAL at 13:14

## 2025-06-20 RX ADMIN — HYDROMORPHONE HYDROCHLORIDE 0.5 MG: 1 INJECTION, SOLUTION INTRAMUSCULAR; INTRAVENOUS; SUBCUTANEOUS at 14:20

## 2025-06-20 RX ADMIN — ACETAMINOPHEN 975 MG: 325 TABLET, FILM COATED ORAL at 05:37

## 2025-06-20 RX ADMIN — HEPARIN SODIUM 5000 UNITS: 5000 INJECTION INTRAVENOUS; SUBCUTANEOUS at 20:35

## 2025-06-20 RX ADMIN — OXYCODONE HYDROCHLORIDE 10 MG: 10 TABLET ORAL at 09:32

## 2025-06-20 RX ADMIN — ONDANSETRON 4 MG: 2 INJECTION INTRAMUSCULAR; INTRAVENOUS at 20:27

## 2025-06-20 RX ADMIN — OXYCODONE HYDROCHLORIDE 10 MG: 10 TABLET ORAL at 14:20

## 2025-06-20 RX ADMIN — ONDANSETRON 4 MG: 2 INJECTION INTRAMUSCULAR; INTRAVENOUS at 05:39

## 2025-06-20 NOTE — PROGRESS NOTES
"Progress Note - Vascular Surgery   Erik Cordon 49 y.o. male MRN: [id number]  Unit/Bed#: [facility] 513-01 Encounter: [id number]    Assessment:  48 yo M w/ L external iliac artery occlusion and LLE rest pain presented electively for LLE bypass.     6/18 L extended CFEA, L EZEQUIEL-CFA bypass w/ rifampin soaked dacron     Plan:  PO and IV pain control PRN  Regular diet  Continue aspirin and statin  Bowel regimen  Encourage ambulation, PT/OT, ISB  Downgrade to med/surg today  Dispo pending adequate pain control     ** Please contact the BE Vascular Surgery Resident/AP role for any questions or concerns that might arise      Subjective/Objective     Subjective: No acute events overnight. Pain is moderately well controlled. Tolerating PO. Denies any LLE pain or M/S deficits.    Objective:     Blood pressure 112/57, pulse 92, temperature 98.7 °F (37.1 °C), temperature source Oral, resp. rate 18, height 5' 6\" (1.676 m), weight 50.8 kg (112 lb), SpO2 98%.,Body mass index is 18.08 kg/m².      Intake/Output Summary (Last 24 hours) at 6/20/2025 0749  Last data filed at 6/20/2025 0600  Gross per 24 hour   Intake 480 ml   Output 1525 ml   Net -1045 ml       Invasive Devices       Peripheral Intravenous Line  Duration             Peripheral IV Right Forearm -- days    Peripheral IV 06/20/25 Distal;Right;Ventral (anterior) Forearm <1 day                    Physical Exam:   GEN: NAD  HEENT: NCAT, EOMI  CV: Mild tachycardia, normotensive. Palpable L DP/PT pulses  Lung: Normal effort, saturating well on room air  Ab: Soft, nondistended. L paramedian incisional dressing c/d/I without signs of hematoma or active bleeding  Extrem: No CCE. L groin incisional dressing c/d/I without appreciable hematoma or signs of active bleeding  Neuro: A+Ox3, M/S intact     Lab, Imaging and other studies:I have personally reviewed pertinent lab results.     VTE Pharmacologic Prophylaxis: VTE covered by:  heparin (porcine), Subcutaneous, 5,000 Units at " 06/19/25 0500    Recent Results (from the past 36 hours)   Basic Metabolic Panel    Collection Time: 06/19/25  4:56 AM   Result Value Ref Range    Sodium 135 135 - 147 mmol/L    Potassium 4.0 3.5 - 5.3 mmol/L    Chloride 103 96 - 108 mmol/L    CO2 26 21 - 32 mmol/L    ANION GAP 6 4 - 13 mmol/L    BUN 11 5 - 25 mg/dL    Creatinine 0.72 0.60 - 1.30 mg/dL    Glucose 95 65 - 140 mg/dL    Calcium 8.4 8.4 - 10.2 mg/dL    eGFR 111 ml/min/1.73sq m   CBC and Platelet    Collection Time: 06/19/25  4:56 AM   Result Value Ref Range    WBC 10.54 (H) 4.31 - 10.16 Thousand/uL    RBC 3.36 (L) 3.88 - 5.62 Million/uL    Hemoglobin 10.1 (L) 12.0 - 17.0 g/dL    Hematocrit 31.2 (L) 36.5 - 49.3 %    MCV 93 82 - 98 fL    MCH 30.1 26.8 - 34.3 pg    MCHC 32.4 31.4 - 37.4 g/dL    RDW 13.3 11.6 - 15.1 %    Platelets 143 (L) 149 - 390 Thousands/uL    MPV 10.7 8.9 - 12.7 fL   Magnesium    Collection Time: 06/19/25  4:56 AM   Result Value Ref Range    Magnesium 2.2 1.9 - 2.7 mg/dL   Phosphorus    Collection Time: 06/19/25  4:56 AM   Result Value Ref Range    Phosphorus 4.2 2.7 - 4.5 mg/dL   Calcium, ionized    Collection Time: 06/19/25  4:56 AM   Result Value Ref Range    Calcium, Ionized 1.07 (L) 1.12 - 1.32 mmol/L   ECG 12 lead    Collection Time: 06/19/25  6:44 AM   Result Value Ref Range    Ventricular Rate 90 BPM    Atrial Rate 90 BPM    GA Interval 132 ms    QRSD Interval 78 ms    QT Interval 322 ms    QTC Interval 393 ms    P Axis 76 degrees    QRS Axis 79 degrees    T Wave Axis 79 degrees   Shower Chair with Back [$]    Collection Time: 06/19/25  2:51 PM   Result Value Ref Range    Supplier Name [employer]     Supplier Phone Number [phone]     Order Status Contacting Patient     Delivery Note      Delivery Request Date 06/20/2025     Item Description Shower Chair (With Back) [$]    Basic metabolic panel    Collection Time: 06/20/25  4:34 AM   Result Value Ref Range    Sodium 136 135 - 147 mmol/L     Potassium 4.1 3.5 - 5.3 mmol/L    Chloride 102 96 - 108 mmol/L    CO2 25 21 - 32 mmol/L    ANION GAP 9 4 - 13 mmol/L    BUN 14 5 - 25 mg/dL    Creatinine 0.80 0.60 - 1.30 mg/dL    Glucose 87 65 - 140 mg/dL    Calcium 9.1 8.4 - 10.2 mg/dL    eGFR 106 ml/min/1.73sq m   CBC and differential    Collection Time: 06/20/25  4:34 AM   Result Value Ref Range    WBC 8.18 4.31 - 10.16 Thousand/uL    RBC 3.51 (L) 3.88 - 5.62 Million/uL    Hemoglobin 10.7 (L) 12.0 - 17.0 g/dL    Hematocrit 32.7 (L) 36.5 - 49.3 %    MCV 93 82 - 98 fL    MCH 30.5 26.8 - 34.3 pg    MCHC 32.7 31.4 - 37.4 g/dL    RDW 13.2 11.6 - 15.1 %    MPV 10.9 8.9 - 12.7 fL    Platelets 155 149 - 390 Thousands/uL    nRBC 0 /100 WBCs   Prepare Leukoreduced RBC: 2 Units    Collection Time: 06/20/25  7:41 AM   Result Value Ref Range    Unit Product Code Q9140Z96     Unit Number [id number]     Unit ABO A     Unit RH POS     Crossmatch Compatible     Unit Dispense Status Return to Inv     Unit Product Volume 350 mL    Unit Product Code O8555J17     Unit Number [id number]     Unit ABO A     Unit RH POS     Crossmatch Compatible     Unit Dispense Status Return to Inv     Unit Product Volume 350 mL

## 2025-06-21 PROCEDURE — 97530 THERAPEUTIC ACTIVITIES: CPT

## 2025-06-21 PROCEDURE — 97112 NEUROMUSCULAR REEDUCATION: CPT

## 2025-06-21 PROCEDURE — 99024 POSTOP FOLLOW-UP VISIT: CPT | Performed by: SURGERY

## 2025-06-21 PROCEDURE — 97116 GAIT TRAINING THERAPY: CPT

## 2025-06-21 RX ORDER — LORAZEPAM 2 MG/ML
0.5 INJECTION INTRAMUSCULAR EVERY 4 HOURS PRN
Status: DISCONTINUED | OUTPATIENT
Start: 2025-06-21 | End: 2025-06-21

## 2025-06-21 RX ORDER — CALCIUM CARBONATE 500 MG/1
500 TABLET, CHEWABLE ORAL DAILY PRN
Status: DISCONTINUED | OUTPATIENT
Start: 2025-06-21 | End: 2025-06-23 | Stop reason: HOSPADM

## 2025-06-21 RX ADMIN — OXYCODONE HYDROCHLORIDE 10 MG: 10 TABLET ORAL at 11:49

## 2025-06-21 RX ADMIN — HEPARIN SODIUM 5000 UNITS: 5000 INJECTION INTRAVENOUS; SUBCUTANEOUS at 05:58

## 2025-06-21 RX ADMIN — OXYCODONE HYDROCHLORIDE 10 MG: 10 TABLET ORAL at 17:12

## 2025-06-21 RX ADMIN — FAMOTIDINE 20 MG: 20 TABLET, FILM COATED ORAL at 09:27

## 2025-06-21 RX ADMIN — FAMOTIDINE 20 MG: 20 TABLET, FILM COATED ORAL at 17:12

## 2025-06-21 RX ADMIN — DOCUSATE SODIUM 100 MG: 100 CAPSULE, LIQUID FILLED ORAL at 17:12

## 2025-06-21 RX ADMIN — ACETAMINOPHEN 975 MG: 325 TABLET, FILM COATED ORAL at 21:12

## 2025-06-21 RX ADMIN — ACETAMINOPHEN 975 MG: 325 TABLET, FILM COATED ORAL at 13:45

## 2025-06-21 RX ADMIN — ATORVASTATIN CALCIUM 40 MG: 40 TABLET, FILM COATED ORAL at 17:12

## 2025-06-21 RX ADMIN — CALCIUM CARBONATE (ANTACID) CHEW TAB 500 MG 500 MG: 500 CHEW TAB at 19:30

## 2025-06-21 RX ADMIN — HEPARIN SODIUM 5000 UNITS: 5000 INJECTION INTRAVENOUS; SUBCUTANEOUS at 21:13

## 2025-06-21 RX ADMIN — OXYCODONE HYDROCHLORIDE 10 MG: 10 TABLET ORAL at 00:03

## 2025-06-21 RX ADMIN — SENNOSIDES 8.6 MG: 8.6 TABLET, FILM COATED ORAL at 09:27

## 2025-06-21 RX ADMIN — ACETAMINOPHEN 975 MG: 325 TABLET, FILM COATED ORAL at 05:58

## 2025-06-21 RX ADMIN — DOCUSATE SODIUM 100 MG: 100 CAPSULE, LIQUID FILLED ORAL at 09:27

## 2025-06-21 RX ADMIN — HEPARIN SODIUM 5000 UNITS: 5000 INJECTION INTRAVENOUS; SUBCUTANEOUS at 13:45

## 2025-06-21 RX ADMIN — ASPIRIN 81 MG: 81 TABLET ORAL at 09:27

## 2025-06-21 NOTE — PHYSICAL THERAPY NOTE
Physical Therapy Progress Note       06/21/25 1340   PT Last Visit   PT Visit Date 06/21/25   Note Type   Note Type Treatment   Pain Assessment   Pain Assessment Tool 0-10   Pain Score 10 - Worst Possible Pain   Pain Location/Orientation Orientation: Left;Location: Groin   Hospital Pain Intervention(s) Repositioned;Ambulation/increased activity;Emotional support   Restrictions/Precautions   Other Precautions Pain;Fall Risk   Subjective   Subjective The patient notes significant groin pain, and that he struggles to stand upright. He reports significant deviation from his baseline mobility.   Transfers   Sit to Stand 5  Supervision   Additional items Increased time required   Stand to Sit 5  Supervision   Additional items Increased time required;Verbal cues  (Instruction for pursed-lip breathing.)   Ambulation/Elevation   Gait pattern Excessively slow;Short stride;Decreased foot clearance;Forward Flexion;Inconsistent booker   Gait Assistance 5  Supervision   Additional items Verbal cues   Assistive Device Rolling walker   Distance 12 feet, 18 feet, 20 feet, 10 feet.   Balance   Static Sitting Fair +   Dynamic Sitting Fair   Static Standing Fair   Dynamic Standing Fair -   Ambulatory Fair -   Activity Tolerance   Activity Tolerance Patient limited by pain   Medical Staff Made Aware (S)  Megan PERSAUD PT DPT re: patient appropriate for goals of 2 flights of stairs independently   Nurse Made Aware Yes.   Assessment   Prognosis Good   Problem List Pain;Impaired sensation;Decreased mobility;Decreased endurance;Impaired balance;Decreased range of motion   Assessment While the patient is demonstrating improvement in his balance scores as well as his overall ambulatory distances, he continues to remain limited to not even household distance ambulation. Transfers continued to improve during the session, and he took bigger steps with the last ambulatory trial Due to increasing pain and discomfort he need to take several seated  rests. Strength is improved, and anticipate that he will not have physical difficulty with ascending the stairs as his pain allows. This session was coordinated over an hour after his PRN pain medication was administered as well, but the patient noted improved pain earlier in the day. Plan to see him tomorrow morning to progress toward a stair trial. Educated the patient in moderate stretching while in the chair and bed by lowering the back of the chair or bed to acclimate to a less flexed posture throughout the evening.   Barriers to Discharge None   Goals   Patient Goals To have less pain, and to get his strength back.   STG Expiration Date 07/03/25   PT Treatment Day 1   Plan   Treatment/Interventions Functional transfer training;LE strengthening/ROM;Elevations;Therapeutic exercise;Endurance training;Patient/family training;Bed mobility;Gait training   Progress Progressing toward goals   PT Frequency 3-5x/wk   Discharge Recommendation   Rehab Resource Intensity Level, PT III (Minimum Resource Intensity)   Equipment Recommended Walker   Walker Package Recommended Wheeled walker   AM-PAC Basic Mobility Inpatient   Turning in Flat Bed Without Bedrails 4   Lying on Back to Sitting on Edge of Flat Bed Without Bedrails 4   Moving Bed to Chair 3   Standing Up From Chair Using Arms 3   Walk in Room 3   Climb 3-5 Stairs With Railing 3   Basic Mobility Inpatient Raw Score 20   Basic Mobility Standardized Score 43.99   [facility] Highest Level Of Mobility   -HLM Goal 6: Walk 10 steps or more   -HLM Achieved 7: Walk 25 feet or more         An AM-PAC Basic Mobility raw score less than 16 suggests the patient may benefit from discharge to post-acute rehab services.    Luis Can, PTA

## 2025-06-21 NOTE — PLAN OF CARE
Problem: PHYSICAL THERAPY ADULT  Goal: Performs mobility at highest level of function for planned discharge setting.  See evaluation for individualized goals.  Description: Treatment/Interventions: ADL retraining, Functional transfer training, LE strengthening/ROM, Therapeutic exercise, Endurance training, Patient/family training, Equipment eval/education, Bed mobility, Gait training, Spoke to nursing, OT  Equipment Recommended: Walker       See flowsheet documentation for full assessment, interventions and recommendations.  Outcome: Progressing  Note: Prognosis: Good  Problem List: Pain, Impaired sensation, Decreased mobility, Decreased endurance, Impaired balance, Decreased range of motion  Assessment: While the patient is demonstrating improvement in his balance scores as well as his overall ambulatory distances, he continues to remain limited to not even household distance ambulation. Transfers continued to improve during the session, and he took bigger steps with the last ambulatory trial Due to increasing pain and discomfort he need to take several seated rests. Strength is improved, and anticipate that he will not have physical difficulty with ascending the stairs as his pain allows. This session was coordinated over an hour after his PRN pain medication was administered as well, but the patient noted improved pain earlier in the day. Plan to see him tomorrow morning to progress toward a stair trial. Educated the patient in moderate stretching while in the chair and bed by lowering the back of the chair or bed to acclimate to a less flexed posture throughout the evening.  Barriers to Discharge: None     Rehab Resource Intensity Level, PT: III (Minimum Resource Intensity)    See flowsheet documentation for full assessment.

## 2025-06-21 NOTE — PLAN OF CARE
Problem: PAIN - ADULT  Goal: Verbalizes/displays adequate comfort level or baseline comfort level  Description: Interventions:  - Encourage patient to monitor pain and request assistance  - Assess pain using appropriate pain scale  - Administer analgesics as ordered based on type and severity of pain and evaluate response  - Implement non-pharmacological measures as appropriate and evaluate response  - Consider cultural and social influences on pain and pain management  - Notify physician/advanced practitioner if interventions unsuccessful or patient reports new pain  - Educate patient/family on pain management process including their role and importance of  reporting pain   - Provide non-pharmacologic/complimentary pain relief interventions  Outcome: Progressing     Problem: INFECTION - ADULT  Goal: Absence or prevention of progression during hospitalization  Description: INTERVENTIONS:  - Assess and monitor for signs and symptoms of infection  - Monitor lab/diagnostic results  - Monitor all insertion sites, i.e. indwelling lines, tubes, and drains  - Monitor endotracheal if appropriate and nasal secretions for changes in amount and color  - [place] appropriate cooling/warming therapies per order  - Administer medications as ordered  - Instruct and encourage patient and family to use good hand hygiene technique  - Identify and instruct in appropriate isolation precautions for identified infection/condition  Outcome: Progressing     Problem: SAFETY ADULT  Goal: Patient will remain free of falls  Description: INTERVENTIONS:  - Educate patient/family on patient safety including physical limitations  - Instruct patient to call for assistance with activity   - Consider consulting OT/PT to assist with strengthening/mobility based on AM PAC & JH-HLM score  - Consult OT/PT to assist with strengthening/mobility   - Keep Call bell within reach  - Keep bed low and locked with side rails adjusted as appropriate  - Keep  care items and personal belongings within reach  - Initiate and maintain comfort rounds  - Make Fall Risk Sign visible to staff  - Apply yellow socks and bracelet for high fall risk patients  - Consider moving patient to room near nurses station  Outcome: Progressing  Goal: Maintain or return to baseline ADL function  Description: INTERVENTIONS:  -  Assess patient's ability to carry out ADLs; assess patient's baseline for ADL function and identify physical deficits which impact ability to perform ADLs (bathing, care of mouth/teeth, toileting, grooming, dressing, etc.)  - Assess/evaluate cause of self-care deficits   - Assess range of motion  - Assess patient's mobility; develop plan if impaired  - Assess patient's need for assistive devices and provide as appropriate  - Encourage maximum independence but intervene and supervise when necessary  - Involve family in performance of ADLs  - Assess for home care needs following discharge   - Consider OT consult to assist with ADL evaluation and planning for discharge  - Provide patient education as appropriate  - Monitor functional capacity and physical performance, use of AM PAC & JH-HLM   - Monitor gait, balance and fatigue with ambulation    Outcome: Progressing  Goal: Maintains/Returns to pre admission functional level  Description: INTERVENTIONS:  - Perform AM-PAC 6 Click Basic Mobility/ Daily Activity assessment daily.  - Set and communicate daily mobility goal to care team and patient/family/caregiver.   - Collaborate with rehabilitation services on mobility goals if consulted  - Perform Range of Motion 3 times a day.  - Reposition patient every 3 hours.  - Dangle patient 3 times a day  - Stand patient 3 times a day  - Ambulate patient 3 times a day  - Out of bed to chair 3 times a day   - Out of bed for meals 3 times a day  - Out of bed for toileting  - Record patient progress and toleration of activity level   Outcome: Progressing     Problem: DISCHARGE  PLANNING  Goal: Discharge to home or other facility with appropriate resources  Description: INTERVENTIONS:  - Identify barriers to discharge w/patient and caregiver  - Arrange for needed discharge resources and transportation as appropriate  - Identify discharge learning needs (meds, wound care, etc.)  - Arrange for interpretive services to assist at discharge as needed  - Refer to Case Management Department for coordinating discharge planning if the patient needs post-hospital services based on physician/advanced practitioner order or complex needs related to functional status, cognitive ability, or social support system  Outcome: Progressing     Problem: Knowledge Deficit  Goal: Patient/family/caregiver demonstrates understanding of disease process, treatment plan, medications, and discharge instructions  Description: Complete learning assessment and assess knowledge base.  Interventions:  - Provide teaching at level of understanding  - Provide teaching via preferred learning methods  Outcome: Progressing     Problem: Nutrition/Hydration-ADULT  Goal: Nutrient/Hydration intake appropriate for improving, restoring or maintaining nutritional needs  Description: Monitor and assess patient's nutrition/hydration status for malnutrition. Collaborate with interdisciplinary team and initiate plan and interventions as ordered.  Monitor patient's weight and dietary intake as ordered or per policy. Utilize nutrition screening tool and intervene as necessary. Determine patient's food preferences and provide high-protein, high-caloric foods as appropriate.     INTERVENTIONS:  - Monitor oral intake, urinary output, labs, and treatment plans  - Assess nutrition and hydration status and recommend course of action  - Evaluate amount of meals eaten  - Assist patient with eating if necessary   - Allow adequate time for meals  - Recommend/ encourage appropriate diets, oral nutritional supplements, and vitamin/mineral supplements  -  Order, calculate, and assess calorie counts as needed  - Recommend, monitor, and adjust tube feedings and TPN/PPN based on assessed needs  - Assess need for intravenous fluids  - Provide specific nutrition/hydration education as appropriate  - Include patient/family/caregiver in decisions related to nutrition  Outcome: Progressing     Problem: CARDIOVASCULAR - ADULT  Goal: Maintains optimal cardiac output and hemodynamic stability  Description: INTERVENTIONS:  - Monitor I/O, vital signs and rhythm  - Monitor for S/S and trends of decreased cardiac output  - Administer and titrate ordered vasoactive medications to optimize hemodynamic stability  - Assess quality of pulses, skin color and temperature  - Assess for signs of decreased coronary artery perfusion  - Instruct patient to report change in severity of symptoms  Outcome: Progressing  Goal: Absence of cardiac dysrhythmias or at baseline rhythm  Description: INTERVENTIONS:  - Continuous cardiac monitoring, vital signs, obtain 12 lead EKG if ordered  - Administer antiarrhythmic and heart rate control medications as ordered  - Monitor electrolytes and administer replacement therapy as ordered  Outcome: Progressing     Problem: GENITOURINARY - ADULT  Goal: Maintains or returns to baseline urinary function  Description: INTERVENTIONS:  - Assess urinary function  - Encourage oral fluids to ensure adequate hydration if ordered  - Administer IV fluids as ordered to ensure adequate hydration  - Administer ordered medications as needed  - Offer frequent toileting  - Follow urinary retention protocol if ordered  Outcome: Progressing  Goal: Absence of urinary retention  Description: INTERVENTIONS:  - Assess patient’s ability to void and empty bladder  - Monitor I/O  - Bladder scan as needed  - Discuss with physician/AP medications to alleviate retention as needed  - Discuss catheterization for long term situations as appropriate  Outcome: Progressing  Goal: Urinary catheter  remains patent  Description: INTERVENTIONS:  - Assess patency of urinary catheter  - If patient has a chronic armenta, consider changing catheter if non-functioning  - Follow guidelines for intermittent irrigation of non-functioning urinary catheter  Outcome: Progressing     Problem: METABOLIC, FLUID AND ELECTROLYTES - ADULT  Goal: Electrolytes maintained within normal limits  Description: INTERVENTIONS:  - Monitor labs and assess patient for signs and symptoms of electrolyte imbalances  - Administer electrolyte replacement as ordered  - Monitor response to electrolyte replacements, including repeat lab results as appropriate  - Instruct patient on fluid and nutrition as appropriate  Outcome: Progressing  Goal: Fluid balance maintained  Description: INTERVENTIONS:  - Monitor labs   - Monitor I/O and WT  - Instruct patient on fluid and nutrition as appropriate  - Assess for signs & symptoms of volume excess or deficit  Outcome: Progressing  Goal: Glucose maintained within target range  Description: INTERVENTIONS:  - Monitor Blood Glucose as ordered  - Assess for signs and symptoms of hyperglycemia and hypoglycemia  - Administer ordered medications to maintain glucose within target range  - Assess nutritional intake and initiate nutrition service referral as needed  Outcome: Progressing       - Consider nutrition services referral as needed  Outcome: Progressing

## 2025-06-21 NOTE — PROGRESS NOTES
"Progress Note - Vascular Surgery   Erik Cordon 49 y.o. male MRN: [id number]  Unit/Bed#: [facility] 507-01 Encounter: [id number]    Assessment:  50 yo M w/ L external iliac artery occlusion and LLE rest pain presented electively for LLE bypass.     6/18 L extended CFEA, L EZEQUIEL-CFA bypass w/ rifampin soaked dacron     Plan:  PO pain control PRN  Regular diet  Continue aspirin and statin  Bowel regimen  Encourage ambulation, PT/OT, ISB. Must do stairs today as pt has multiple flights of stairs at home  Continue med/surg level of care  Dispo planning in the next 12-24 hrs pending ambulation     ** Please contact the [facility] Vascular Surgery Resident/AP role for any questions or concerns that might arise      Subjective/Objective     Subjective: No acute events overnight. Pain is well controlled. Tolerating PO. Denies any LLE pain or M/S deficits. Has been OOB.    Objective:     Blood pressure 129/81, pulse (!) 107, temperature 98.7 °F (37.1 °C), temperature source Oral, resp. rate 16, height 5' 6\" (1.676 m), weight 50.8 kg (112 lb), SpO2 98%.,Body mass index is 18.08 kg/m².      Intake/Output Summary (Last 24 hours) at 6/21/2025 1016  Last data filed at 6/21/2025 0900  Gross per 24 hour   Intake 840 ml   Output 200 ml   Net 640 ml       Invasive Devices       Peripheral Intravenous Line  Duration             Peripheral IV 06/20/25 Distal;Right;Ventral (anterior) Forearm 1 day                    Physical Exam:   GEN: NAD  HEENT: NCAT, EOMI  CV: Mild tachycardia, normotensive. Palpable L DP/PT pulses  Lung: Normal effort, saturating well on room air  Ab: Soft, nondistended. L paramedian incisional dressing c/d/I without signs of hematoma or active bleeding  Extrem: No CCE. L groin incisional dressing c/d/I without appreciable hematoma or signs of active bleeding  Neuro: A+Ox3, M/S intact     Lab, Imaging and other studies:I have personally reviewed pertinent lab results.     VTE Pharmacologic Prophylaxis: VTE covered " by:  heparin (porcine), Subcutaneous, 5,000 Units at 06/19/25 0500    Recent Results (from the past 36 hours)   Basic metabolic panel    Collection Time: 06/20/25  4:34 AM   Result Value Ref Range    Sodium 136 135 - 147 mmol/L    Potassium 4.1 3.5 - 5.3 mmol/L    Chloride 102 96 - 108 mmol/L    CO2 25 21 - 32 mmol/L    ANION GAP 9 4 - 13 mmol/L    BUN 14 5 - 25 mg/dL    Creatinine 0.80 0.60 - 1.30 mg/dL    Glucose 87 65 - 140 mg/dL    Calcium 9.1 8.4 - 10.2 mg/dL    eGFR 106 ml/min/1.73sq m   CBC and differential    Collection Time: 06/20/25  4:34 AM   Result Value Ref Range    WBC 8.18 4.31 - 10.16 Thousand/uL    RBC 3.51 (L) 3.88 - 5.62 Million/uL    Hemoglobin 10.7 (L) 12.0 - 17.0 g/dL    Hematocrit 32.7 (L) 36.5 - 49.3 %    MCV 93 82 - 98 fL    MCH 30.5 26.8 - 34.3 pg    MCHC 32.7 31.4 - 37.4 g/dL    RDW 13.2 11.6 - 15.1 %    MPV 10.9 8.9 - 12.7 fL    Platelets 155 149 - 390 Thousands/uL    nRBC 0 /100 WBCs   Prepare Leukoreduced RBC: 2 Units    Collection Time: 06/20/25  7:41 AM   Result Value Ref Range    Unit Product Code B6058K90     Unit Number [id number]     Unit ABO A     Unit RH POS     Crossmatch Compatible     Unit Dispense Status Return to Inv     Unit Product Volume 350 mL    Unit Product Code L7241M68     Unit Number [id number]     Unit ABO A     Unit RH POS     Crossmatch Compatible     Unit Dispense Status Return to Inv     Unit Product Volume 350 mL   Wheeled Walker    Collection Time: 06/20/25  2:35 PM   Result Value Ref Range    Supplier Name [employer]     Supplier Phone Number [phone]     Order Status Completed     Delivery Note please deliver to patient's room     Delivery Request Date 06/20/2025     Item Description Wheeled Walker, Adult

## 2025-06-22 ENCOUNTER — HOME HEALTH ADMISSION (OUTPATIENT)
Dept: HOME HEALTH SERVICES | Facility: HOME HEALTHCARE | Age: 50
End: 2025-06-22
Payer: COMMERCIAL

## 2025-06-22 PROCEDURE — 97112 NEUROMUSCULAR REEDUCATION: CPT

## 2025-06-22 PROCEDURE — 97116 GAIT TRAINING THERAPY: CPT

## 2025-06-22 PROCEDURE — 99024 POSTOP FOLLOW-UP VISIT: CPT | Performed by: SURGERY

## 2025-06-22 PROCEDURE — 97530 THERAPEUTIC ACTIVITIES: CPT

## 2025-06-22 RX ORDER — MAGNESIUM CARB/ALUMINUM HYDROX 105-160MG
296 TABLET,CHEWABLE ORAL DAILY
Status: DISCONTINUED | OUTPATIENT
Start: 2025-06-22 | End: 2025-06-23 | Stop reason: HOSPADM

## 2025-06-22 RX ORDER — BISACODYL 10 MG
10 SUPPOSITORY, RECTAL RECTAL DAILY PRN
Status: DISCONTINUED | OUTPATIENT
Start: 2025-06-22 | End: 2025-06-23 | Stop reason: HOSPADM

## 2025-06-22 RX ADMIN — FAMOTIDINE 20 MG: 20 TABLET, FILM COATED ORAL at 17:24

## 2025-06-22 RX ADMIN — OXYCODONE HYDROCHLORIDE 10 MG: 10 TABLET ORAL at 07:56

## 2025-06-22 RX ADMIN — ACETAMINOPHEN 975 MG: 325 TABLET, FILM COATED ORAL at 13:09

## 2025-06-22 RX ADMIN — ACETAMINOPHEN 975 MG: 325 TABLET, FILM COATED ORAL at 21:58

## 2025-06-22 RX ADMIN — CALCIUM CARBONATE (ANTACID) CHEW TAB 500 MG 500 MG: 500 CHEW TAB at 13:09

## 2025-06-22 RX ADMIN — FAMOTIDINE 20 MG: 20 TABLET, FILM COATED ORAL at 07:53

## 2025-06-22 RX ADMIN — ASPIRIN 81 MG: 81 TABLET ORAL at 07:53

## 2025-06-22 RX ADMIN — POLYETHYLENE GLYCOL 3350 17 G: 17 POWDER, FOR SOLUTION ORAL at 07:52

## 2025-06-22 RX ADMIN — OXYCODONE HYDROCHLORIDE 10 MG: 10 TABLET ORAL at 11:56

## 2025-06-22 RX ADMIN — HEPARIN SODIUM 5000 UNITS: 5000 INJECTION INTRAVENOUS; SUBCUTANEOUS at 05:28

## 2025-06-22 RX ADMIN — OXYCODONE HYDROCHLORIDE 10 MG: 10 TABLET ORAL at 00:06

## 2025-06-22 RX ADMIN — MAGNESIUM CITRATE 296 ML: 1.75 LIQUID ORAL at 17:24

## 2025-06-22 RX ADMIN — ATORVASTATIN CALCIUM 40 MG: 40 TABLET, FILM COATED ORAL at 17:24

## 2025-06-22 RX ADMIN — DOCUSATE SODIUM 100 MG: 100 CAPSULE, LIQUID FILLED ORAL at 07:53

## 2025-06-22 RX ADMIN — OXYCODONE HYDROCHLORIDE 10 MG: 10 TABLET ORAL at 22:50

## 2025-06-22 RX ADMIN — SENNOSIDES 8.6 MG: 8.6 TABLET, FILM COATED ORAL at 07:53

## 2025-06-22 RX ADMIN — ACETAMINOPHEN 975 MG: 325 TABLET, FILM COATED ORAL at 05:29

## 2025-06-22 RX ADMIN — HEPARIN SODIUM 5000 UNITS: 5000 INJECTION INTRAVENOUS; SUBCUTANEOUS at 13:09

## 2025-06-22 RX ADMIN — DOCUSATE SODIUM 100 MG: 100 CAPSULE, LIQUID FILLED ORAL at 17:24

## 2025-06-22 RX ADMIN — HEPARIN SODIUM 5000 UNITS: 5000 INJECTION INTRAVENOUS; SUBCUTANEOUS at 21:58

## 2025-06-22 NOTE — CASE MANAGEMENT
Case Management Discharge Planning Note    Patient name Erik Cordon  Location [facility] 507/[facility] 507-01 MRN [id number]  : 1975 Date 2025       Current Admission Date: 2025  Current Admission Diagnosis:Rest pain of left lower extremity due to atherosclerosis (HCC)   Patient Active Problem List    Diagnosis Date Noted    Rest pain of left lower extremity due to atherosclerosis (HCC) 2025    Severe peripheral arterial disease (HCC) 2025    WEAVER (dyspnea on exertion) 2025    Radiculopathy, lumbar region 01/15/2025    Nicotine dependence with current use 2024    GERD (gastroesophageal reflux disease)     Rest pain of left upper extremity due to atherosclerosis (HCC) 2024      LOS (days): 4  Geometric Mean LOS (GMLOS) (days): 1.8  Days to GMLOS:-2.2     OBJECTIVE:  Risk of Unplanned Readmission Score: 10.28         Current admission status: Inpatient   Preferred Pharmacy:   [facility] - [place], [place] - [address]  [address] [facility] [address]  Phone: [phone] Fax: [phone]    [id number] [place] [facility] [address]  [address]  [place] [facility] [place] [address]  Phone: [phone] Fax: [phone]    [facility]  [place], [place] - [address] [facility] [address]  [address] [facility] [address]  [place] [address]  Phone: [phone] Fax: [phone]    Primary Care Provider: Erendira Falk DO    Primary Insurance: CHARU LOCKE  Secondary Insurance:     DISCHARGE DETAILS:    Discharge planning discussed with:: Pt and wife  Freedom of Choice: Yes  Comments - Freedom of Choice: Choice reviewed  CM contacted family/caregiver?: Yes                  Requested Home Health Care         Home Health Agency Name:: [facility]    DME Referral Provided  Referral made for DME?: Yes  DME referral completed for the following items:: Bedside Commode  DME Supplier Name:: [employer]    Other  Referral/Resources/Interventions Provided:  Interventions: DME         Treatment Team Recommendation: Home with Home Health Care                                               Additional Comments: Review of home health care agency choices for d/c with pt and spouse at bedside. Choosing [facility], updated in [facility]. Pt and spouse also requesting BSC ordered for home delivery, parachute order placed, pending signature and processing. CM dept following for DCP coordination.

## 2025-06-22 NOTE — PLAN OF CARE
Problem: PHYSICAL THERAPY ADULT  Goal: Performs mobility at highest level of function for planned discharge setting.  See evaluation for individualized goals.  Description: Treatment/Interventions: ADL retraining, Functional transfer training, LE strengthening/ROM, Therapeutic exercise, Endurance training, Patient/family training, Equipment eval/education, Bed mobility, Gait training, Spoke to nursing, OT  Equipment Recommended: Walker       See flowsheet documentation for full assessment, interventions and recommendations.  Outcome: Progressing  Note: Prognosis: Excellent  Problem List: Pain, Impaired sensation, Decreased mobility, Decreased endurance, Impaired balance, Decreased range of motion  Assessment: While the patient continues to remain limited due to pain, he was able to greatly improve his ambulatory distance as well as complete stairs. He did take seated and standing rests during the session, but his gait was improved in regards to stride length and balance. His strength is improved as he readily completed the stairs today. Educated him on the importance of continued mobility throughout the day to desensitize his pain. Addressed his concerns and questions to his satisfaction. He has demonstrated the necessary mobility in order to safely return home at discharge.  Barriers to Discharge: None     Rehab Resource Intensity Level, PT: III (Minimum Resource Intensity)    See flowsheet documentation for full assessment.

## 2025-06-22 NOTE — PLAN OF CARE
Problem: PAIN - ADULT  Goal: Verbalizes/displays adequate comfort level or baseline comfort level  Description: Interventions:  - Encourage patient to monitor pain and request assistance  - Assess pain using appropriate pain scale  - Administer analgesics as ordered based on type and severity of pain and evaluate response  - Implement non-pharmacological measures as appropriate and evaluate response  - Consider cultural and social influences on pain and pain management  - Notify physician/advanced practitioner if interventions unsuccessful or patient reports new pain  - Educate patient/family on pain management process including their role and importance of  reporting pain   - Provide non-pharmacologic/complimentary pain relief interventions  Outcome: Progressing     Problem: INFECTION - ADULT  Goal: Absence or prevention of progression during hospitalization  Description: INTERVENTIONS:  - Assess and monitor for signs and symptoms of infection  - Monitor lab/diagnostic results  - Monitor all insertion sites, i.e. indwelling lines, tubes, and drains  - Monitor endotracheal if appropriate and nasal secretions for changes in amount and color  - [place] appropriate cooling/warming therapies per order  - Administer medications as ordered  - Instruct and encourage patient and family to use good hand hygiene technique  - Identify and instruct in appropriate isolation precautions for identified infection/condition  Outcome: Progressing     Problem: SAFETY ADULT  Goal: Patient will remain free of falls  Description: INTERVENTIONS:  - Educate patient/family on patient safety including physical limitations  - Instruct patient to call for assistance with activity   - Consider consulting OT/PT to assist with strengthening/mobility based on AM PAC & JH-HLM score  - Consult OT/PT to assist with strengthening/mobility   - Keep Call bell within reach  - Keep bed low and locked with side rails adjusted as appropriate  - Keep  care items and personal belongings within reach  - Initiate and maintain comfort rounds  - Make Fall Risk Sign visible to staff    - Apply yellow socks and bracelet for high fall risk patients  - Consider moving patient to room near nurses station  Outcome: Progressing  Goal: Maintain or return to baseline ADL function  Description: INTERVENTIONS:  -  Assess patient's ability to carry out ADLs; assess patient's baseline for ADL function and identify physical deficits which impact ability to perform ADLs (bathing, care of mouth/teeth, toileting, grooming, dressing, etc.)  - Assess/evaluate cause of self-care deficits   - Assess range of motion  - Assess patient's mobility; develop plan if impaired  - Assess patient's need for assistive devices and provide as appropriate  - Encourage maximum independence but intervene and supervise when necessary  - Involve family in performance of ADLs  - Assess for home care needs following discharge   - Consider OT consult to assist with ADL evaluation and planning for discharge  - Provide patient education as appropriate  - Monitor functional capacity and physical performance, use of AM PAC & JH-HLM   - Monitor gait, balance and fatigue with ambulation    Outcome: Progressing  Goal: Maintains/Returns to pre admission functional level  Description: INTERVENTIONS:  - Perform AM-PAC 6 Click Basic Mobility/ Daily Activity assessment daily.  - Set and communicate daily mobility goal to care team and patient/family/caregiver.   - Collaborate with rehabilitation services on mobility goals if consulted  - Perform Range of Motion 3 times a day.  - Reposition patient every 3 hours.  - Dangle patient 3 times a day  - Stand patient 3 times a day  - Ambulate patient 3 times a day  - Out of bed to chair 3 times a day   - Out of bed for meals 3 times a day  - Out of bed for toileting  - Record patient progress and toleration of activity level   Outcome: Progressing     Problem: DISCHARGE  PLANNING  Goal: Discharge to home or other facility with appropriate resources  Description: INTERVENTIONS:  - Identify barriers to discharge w/patient and caregiver  - Arrange for needed discharge resources and transportation as appropriate  - Identify discharge learning needs (meds, wound care, etc.)  - Arrange for interpretive services to assist at discharge as needed  - Refer to Case Management Department for coordinating discharge planning if the patient needs post-hospital services based on physician/advanced practitioner order or complex needs related to functional status, cognitive ability, or social support system  Outcome: Progressing     Problem: Knowledge Deficit  Goal: Patient/family/caregiver demonstrates understanding of disease process, treatment plan, medications, and discharge instructions  Description: Complete learning assessment and assess knowledge base.  Interventions:  - Provide teaching at level of understanding  - Provide teaching via preferred learning methods  Outcome: Progressing     Problem: Nutrition/Hydration-ADULT  Goal: Nutrient/Hydration intake appropriate for improving, restoring or maintaining nutritional needs  Description: Monitor and assess patient's nutrition/hydration status for malnutrition. Collaborate with interdisciplinary team and initiate plan and interventions as ordered.  Monitor patient's weight and dietary intake as ordered or per policy. Utilize nutrition screening tool and intervene as necessary. Determine patient's food preferences and provide high-protein, high-caloric foods as appropriate.     INTERVENTIONS:  - Monitor oral intake, urinary output, labs, and treatment plans  - Assess nutrition and hydration status and recommend course of action  - Evaluate amount of meals eaten  - Assist patient with eating if necessary   - Allow adequate time for meals  - Recommend/ encourage appropriate diets, oral nutritional supplements, and vitamin/mineral supplements  -  Order, calculate, and assess calorie counts as needed  - Recommend, monitor, and adjust tube feedings and TPN/PPN based on assessed needs  - Assess need for intravenous fluids  - Provide specific nutrition/hydration education as appropriate  - Include patient/family/caregiver in decisions related to nutrition  Outcome: Progressing     Problem: CARDIOVASCULAR - ADULT  Goal: Maintains optimal cardiac output and hemodynamic stability  Description: INTERVENTIONS:  - Monitor I/O, vital signs and rhythm  - Monitor for S/S and trends of decreased cardiac output  - Administer and titrate ordered vasoactive medications to optimize hemodynamic stability  - Assess quality of pulses, skin color and temperature  - Assess for signs of decreased coronary artery perfusion  - Instruct patient to report change in severity of symptoms  Outcome: Progressing  Goal: Absence of cardiac dysrhythmias or at baseline rhythm  Description: INTERVENTIONS:  - Continuous cardiac monitoring, vital signs, obtain 12 lead EKG if ordered  - Administer antiarrhythmic and heart rate control medications as ordered  - Monitor electrolytes and administer replacement therapy as ordered  Outcome: Progressing     Problem: GENITOURINARY - ADULT  Goal: Maintains or returns to baseline urinary function  Description: INTERVENTIONS:  - Assess urinary function  - Encourage oral fluids to ensure adequate hydration if ordered  - Administer IV fluids as ordered to ensure adequate hydration  - Administer ordered medications as needed  - Offer frequent toileting  - Follow urinary retention protocol if ordered  Outcome: Progressing  Goal: Absence of urinary retention  Description: INTERVENTIONS:  - Assess patient’s ability to void and empty bladder  - Monitor I/O  - Bladder scan as needed  - Discuss with physician/AP medications to alleviate retention as needed  - Discuss catheterization for long term situations as appropriate  Outcome: Progressing  Goal: Urinary catheter  remains patent  Description: INTERVENTIONS:  - Assess patency of urinary catheter  - If patient has a chronic armenta, consider changing catheter if non-functioning  - Follow guidelines for intermittent irrigation of non-functioning urinary catheter  Outcome: Progressing     Problem: METABOLIC, FLUID AND ELECTROLYTES - ADULT  Goal: Electrolytes maintained within normal limits  Description: INTERVENTIONS:  - Monitor labs and assess patient for signs and symptoms of electrolyte imbalances  - Administer electrolyte replacement as ordered  - Monitor response to electrolyte replacements, including repeat lab results as appropriate  - Instruct patient on fluid and nutrition as appropriate  Outcome: Progressing  Goal: Fluid balance maintained  Description: INTERVENTIONS:  - Monitor labs   - Monitor I/O and WT  - Instruct patient on fluid and nutrition as appropriate  - Assess for signs & symptoms of volume excess or deficit  Outcome: Progressing  Goal: Glucose maintained within target range  Description: INTERVENTIONS:  - Monitor Blood Glucose as ordered  - Assess for signs and symptoms of hyperglycemia and hypoglycemia  - Administer ordered medications to maintain glucose within target range  - Assess nutritional intake and initiate nutrition service referral as needed  Outcome: Progressing     =

## 2025-06-22 NOTE — PROGRESS NOTES
"Progress Note - Vascular Surgery   Erik Cordon 49 y.o. 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     6/18 L extended CFEA, L EZEQUIEL-CFA bypass w/ rifampin soaked dacron     Plan:  PO pain control PRN  Regular diet  Continue aspirin and statin  Bowel regimen, await bowel function, encourage suppository  Encourage ambulation, PT/OT, ISB. Must do stairs again today as pt has multiple flights of stairs at home  Continue med/surg level of care  Dispo planning in the next 12-24 hrs pending ambulation and bowel function     ** Please contact the [facility] Vascular Surgery Resident/AP role for any questions or concerns that might arise      Subjective/Objective     Subjective: No acute events overnight. Pain is well controlled. No BM since admission, feels nauseated. Denies any LLE pain or M/S deficits. Has been OOB.    Objective:     Blood pressure 119/81, pulse 99, temperature 99.5 °F (37.5 °C), temperature source Oral, resp. rate 20, height 5' 6\" (1.676 m), weight 50.8 kg (112 lb), SpO2 97%.,Body mass index is 18.08 kg/m².      Intake/Output Summary (Last 24 hours) at 6/22/2025 0903  Last data filed at 6/22/2025 0801  Gross per 24 hour   Intake 120 ml   Output 450 ml   Net -330 ml       Invasive Devices       Peripheral Intravenous Line  Duration             Peripheral IV 06/20/25 Distal;Right;Ventral (anterior) Forearm 2 days                    Physical Exam:   GEN: NAD  HEENT: NCAT, EOMI  CV: Mild tachycardia, normotensive. Palpable L DP/PT pulses  Lung: Normal effort, saturating well on room air  Ab: Soft, nondistended. L paramedian incisional dressing c/d/I without signs of hematoma or active bleeding  Extrem: No CCE. L groin incisional dressing c/d/I without appreciable hematoma or signs of active bleeding  Neuro: A+Ox3, M/S intact     Lab, Imaging and other studies:I have " personally reviewed pertinent lab results.     VTE Pharmacologic Prophylaxis: VTE covered by:  heparin (porcine), Subcutaneous, 5,000 Units at 06/19/25 0500    No results found for this or any previous visit (from the past 36 hours).   Yes

## 2025-06-22 NOTE — PHYSICAL THERAPY NOTE
Physical Therapy Progress Note       06/22/25 1010   PT Last Visit   PT Visit Date 06/22/25   Note Type   Note Type Treatment   Pain Assessment   Pain Assessment Tool 0-10   Pain Score 10 - Worst Possible Pain   Pain Location/Orientation Orientation: Left;Location: Groin   Hospital Pain Intervention(s) Repositioned;Ambulation/increased activity;Emotional support   Restrictions/Precautions   Other Precautions Pain   Subjective   Subjective The patient expressed concerns about his incisions opening up as well as managing of his pain.   Transfers   Sit to Stand 6  Modified independent   Additional items Increased time required   Stand to Sit 6  Modified independent   Additional items Increased time required   Ambulation/Elevation   Gait pattern Excessively slow;Forward Flexion   Gait Assistance 5  Supervision   Additional items Verbal cues   Assistive Device Rolling walker   Distance 90 feet, 60 feet, 40 feet.   Stair Management Assistance 5  Supervision   Additional items Verbal cues;Increased time required   Stair Management Technique Two rails;Step to pattern;Foreward   Number of Stairs 14   Balance   Static Sitting Good   Dynamic Sitting Fair +   Static Standing Fair +   Dynamic Standing Fair   Ambulatory Fair   Activity Tolerance   Activity Tolerance Patient tolerated treatment well;Patient limited by pain   Nurse Made Aware FERNANDO Hand.   Assessment   Prognosis Excellent   Problem List Pain;Impaired sensation;Decreased mobility;Decreased endurance;Impaired balance;Decreased range of motion   Assessment While the patient continues to remain limited due to pain, he was able to greatly improve his ambulatory distance as well as complete stairs. He did take seated and standing rests during the session, but his gait was improved in regards to stride length and balance. His strength is improved as he readily completed the stairs today. Educated him on the importance of continued mobility throughout the day to  desensitize his pain. Addressed his concerns and questions to his satisfaction. He has demonstrated the necessary mobility in order to safely return home at discharge.   Barriers to Discharge None   Goals   Patient Goals To have less pain.   STG Expiration Date 07/03/25   PT Treatment Day 2   Plan   Treatment/Interventions Functional transfer training;Elevations;Therapeutic exercise;Patient/family training;Endurance training;Bed mobility;Gait training   Progress Progressing toward goals   PT Frequency 3-5x/wk   Discharge Recommendation   Rehab Resource Intensity Level, PT III (Minimum Resource Intensity)   Equipment Recommended Walker   Walker Package Recommended Wheeled walker   AM-PAC Basic Mobility Inpatient   Turning in Flat Bed Without Bedrails 4   Lying on Back to Sitting on Edge of Flat Bed Without Bedrails 4   Moving Bed to Chair 3   Standing Up From Chair Using Arms 3   Walk in Room 3   Climb 3-5 Stairs With Railing 3   Basic Mobility Inpatient Raw Score 20   Basic Mobility Standardized Score 43.99   [facility] Highest Level Of Mobility   -HLM Goal 6: Walk 10 steps or more   -HLM Achieved 7: Walk 25 feet or more         An AM-PAC Basic Mobility raw score less than 16 suggests the patient may benefit from discharge to post-acute rehab services.    Luis Can, PTA

## 2025-06-23 ENCOUNTER — TELEPHONE (OUTPATIENT)
Age: 50
End: 2025-06-23

## 2025-06-23 ENCOUNTER — TELEMEDICINE (OUTPATIENT)
Age: 50
End: 2025-06-23
Payer: COMMERCIAL

## 2025-06-23 VITALS
TEMPERATURE: 99.3 F | DIASTOLIC BLOOD PRESSURE: 75 MMHG | SYSTOLIC BLOOD PRESSURE: 118 MMHG | WEIGHT: 112 LBS | OXYGEN SATURATION: 97 % | HEART RATE: 100 BPM | BODY MASS INDEX: 18 KG/M2 | HEIGHT: 66 IN | RESPIRATION RATE: 20 BRPM

## 2025-06-23 DIAGNOSIS — D50.9 IRON DEFICIENCY ANEMIA, UNSPECIFIED IRON DEFICIENCY ANEMIA TYPE: Primary | ICD-10-CM

## 2025-06-23 DIAGNOSIS — R61 HYPERHIDROSIS: ICD-10-CM

## 2025-06-23 PROCEDURE — 99024 POSTOP FOLLOW-UP VISIT: CPT | Performed by: PHYSICIAN ASSISTANT

## 2025-06-23 PROCEDURE — NC001 PR NO CHARGE: Performed by: SURGERY

## 2025-06-23 PROCEDURE — 99214 OFFICE O/P EST MOD 30 MIN: CPT | Performed by: FAMILY MEDICINE

## 2025-06-23 RX ORDER — DOCUSATE SODIUM 100 MG/1
100 CAPSULE, LIQUID FILLED ORAL 2 TIMES DAILY
COMMUNITY
Start: 2025-06-23 | End: 2025-06-25 | Stop reason: SDUPTHER

## 2025-06-23 RX ORDER — SENNOSIDES 8.6 MG
8.6 TABLET ORAL DAILY
COMMUNITY
Start: 2025-06-24 | End: 2025-06-25 | Stop reason: SDUPTHER

## 2025-06-23 RX ORDER — ACETAMINOPHEN 325 MG/1
975 TABLET ORAL EVERY 8 HOURS PRN
COMMUNITY
Start: 2025-06-23 | End: 2025-06-25 | Stop reason: SDUPTHER

## 2025-06-23 RX ORDER — ASPIRIN 81 MG/1
81 TABLET ORAL DAILY
COMMUNITY
Start: 2025-06-24 | End: 2025-06-25 | Stop reason: SDUPTHER

## 2025-06-23 RX ORDER — OXYCODONE HYDROCHLORIDE 5 MG/1
5 TABLET ORAL EVERY 4 HOURS PRN
Refills: 0 | Status: DISCONTINUED | OUTPATIENT
Start: 2025-06-23 | End: 2025-06-23 | Stop reason: HOSPADM

## 2025-06-23 RX ORDER — POLYETHYLENE GLYCOL 3350 17 G/17G
17 POWDER, FOR SOLUTION ORAL DAILY PRN
COMMUNITY
Start: 2025-06-23 | End: 2025-06-25 | Stop reason: SDUPTHER

## 2025-06-23 RX ORDER — OXYCODONE HYDROCHLORIDE 5 MG/1
5 TABLET ORAL EVERY 4 HOURS PRN
Qty: 30 TABLET | Refills: 0 | Status: SHIPPED | OUTPATIENT
Start: 2025-06-23 | End: 2025-07-03

## 2025-06-23 RX ADMIN — FAMOTIDINE 20 MG: 20 TABLET, FILM COATED ORAL at 08:39

## 2025-06-23 RX ADMIN — HEPARIN SODIUM 5000 UNITS: 5000 INJECTION INTRAVENOUS; SUBCUTANEOUS at 05:44

## 2025-06-23 RX ADMIN — OXYCODONE HYDROCHLORIDE 5 MG: 5 TABLET ORAL at 08:39

## 2025-06-23 RX ADMIN — ASPIRIN 81 MG: 81 TABLET ORAL at 08:39

## 2025-06-23 RX ADMIN — ACETAMINOPHEN 975 MG: 325 TABLET, FILM COATED ORAL at 05:44

## 2025-06-23 RX ADMIN — OXYCODONE HYDROCHLORIDE 10 MG: 10 TABLET ORAL at 02:18

## 2025-06-23 RX ADMIN — DOCUSATE SODIUM 100 MG: 100 CAPSULE, LIQUID FILLED ORAL at 08:39

## 2025-06-23 NOTE — PLAN OF CARE
Problem: PAIN - ADULT  Goal: Verbalizes/displays adequate comfort level or baseline comfort level  Description: Interventions:  - Encourage patient to monitor pain and request assistance  - Assess pain using appropriate pain scale  - Administer analgesics as ordered based on type and severity of pain and evaluate response  - Implement non-pharmacological measures as appropriate and evaluate response  - Consider cultural and social influences on pain and pain management  - Notify physician/advanced practitioner if interventions unsuccessful or patient reports new pain  - Educate patient/family on pain management process including their role and importance of  reporting pain   - Provide non-pharmacologic/complimentary pain relief interventions  Outcome: Progressing     Problem: INFECTION - ADULT  Goal: Absence or prevention of progression during hospitalization  Description: INTERVENTIONS:  - Assess and monitor for signs and symptoms of infection  - Monitor lab/diagnostic results  - Monitor all insertion sites, i.e. indwelling lines, tubes, and drains  - Monitor endotracheal if appropriate and nasal secretions for changes in amount and color  - [place] appropriate cooling/warming therapies per order  - Administer medications as ordered  - Instruct and encourage patient and family to use good hand hygiene technique  - Identify and instruct in appropriate isolation precautions for identified infection/condition  Outcome: Progressing     Problem: SAFETY ADULT  Goal: Patient will remain free of falls  Description: INTERVENTIONS:  - Educate patient/family on patient safety including physical limitations  - Instruct patient to call for assistance with activity   - Consider consulting OT/PT to assist with strengthening/mobility based on AM PAC & JH-HLM score  - Consult OT/PT to assist with strengthening/mobility   - Keep Call bell within reach  - Keep bed low and locked with side rails adjusted as appropriate  - Keep  care items and personal belongings within reach  - Initiate and maintain comfort rounds  - Make Fall Risk Sign visible to staff  - Apply yellow socks and bracelet for high fall risk patients  - Consider moving patient to room near nurses station  Outcome: Progressing  Goal: Maintain or return to baseline ADL function  Description: INTERVENTIONS:  -  Assess patient's ability to carry out ADLs; assess patient's baseline for ADL function and identify physical deficits which impact ability to perform ADLs (bathing, care of mouth/teeth, toileting, grooming, dressing, etc.)  - Assess/evaluate cause of self-care deficits   - Assess range of motion  - Assess patient's mobility; develop plan if impaired  - Assess patient's need for assistive devices and provide as appropriate  - Encourage maximum independence but intervene and supervise when necessary  - Involve family in performance of ADLs  - Assess for home care needs following discharge   - Consider OT consult to assist with ADL evaluation and planning for discharge  - Provide patient education as appropriate  - Monitor functional capacity and physical performance, use of AM PAC & JH-HLM   - Monitor gait, balance and fatigue with ambulation    Outcome: Progressing  Goal: Maintains/Returns to pre admission functional level  Description: INTERVENTIONS:  - Perform AM-PAC 6 Click Basic Mobility/ Daily Activity assessment daily.  - Set and communicate daily mobility goal to care team and patient/family/caregiver.   - Collaborate with rehabilitation services on mobility goals if consulted  - Perform Range of Motion 3 times a day.  - Reposition patient every 3 hours.  - Dangle patient 3 times a day  - Stand patient 3 times a day  - Ambulate patient 3 times a day  - Out of bed to chair 3 times a day   - Out of bed for meals 3 times a day  - Out of bed for toileting  - Record patient progress and toleration of activity level   Outcome: Progressing     Problem: DISCHARGE  PLANNING  Goal: Discharge to home or other facility with appropriate resources  Description: INTERVENTIONS:  - Identify barriers to discharge w/patient and caregiver  - Arrange for needed discharge resources and transportation as appropriate  - Identify discharge learning needs (meds, wound care, etc.)  - Arrange for interpretive services to assist at discharge as needed  - Refer to Case Management Department for coordinating discharge planning if the patient needs post-hospital services based on physician/advanced practitioner order or complex needs related to functional status, cognitive ability, or social support system  Outcome: Progressing     Problem: Knowledge Deficit  Goal: Patient/family/caregiver demonstrates understanding of disease process, treatment plan, medications, and discharge instructions  Description: Complete learning assessment and assess knowledge base.  Interventions:  - Provide teaching at level of understanding  - Provide teaching via preferred learning methods  Outcome: Progressing     Problem: Nutrition/Hydration-ADULT  Goal: Nutrient/Hydration intake appropriate for improving, restoring or maintaining nutritional needs  Description: Monitor and assess patient's nutrition/hydration status for malnutrition. Collaborate with interdisciplinary team and initiate plan and interventions as ordered.  Monitor patient's weight and dietary intake as ordered or per policy. Utilize nutrition screening tool and intervene as necessary. Determine patient's food preferences and provide high-protein, high-caloric foods as appropriate.     INTERVENTIONS:  - Monitor oral intake, urinary output, labs, and treatment plans  - Assess nutrition and hydration status and recommend course of action  - Evaluate amount of meals eaten  - Assist patient with eating if necessary   - Allow adequate time for meals  - Recommend/ encourage appropriate diets, oral nutritional supplements, and vitamin/mineral supplements  -  Order, calculate, and assess calorie counts as needed  - Recommend, monitor, and adjust tube feedings and TPN/PPN based on assessed needs  - Assess need for intravenous fluids  - Provide specific nutrition/hydration education as appropriate  - Include patient/family/caregiver in decisions related to nutrition  Outcome: Progressing     Problem: CARDIOVASCULAR - ADULT  Goal: Maintains optimal cardiac output and hemodynamic stability  Description: INTERVENTIONS:  - Monitor I/O, vital signs and rhythm  - Monitor for S/S and trends of decreased cardiac output  - Administer and titrate ordered vasoactive medications to optimize hemodynamic stability  - Assess quality of pulses, skin color and temperature  - Assess for signs of decreased coronary artery perfusion  - Instruct patient to report change in severity of symptoms  Outcome: Progressing  Goal: Absence of cardiac dysrhythmias or at baseline rhythm  Description: INTERVENTIONS:  - Continuous cardiac monitoring, vital signs, obtain 12 lead EKG if ordered  - Administer antiarrhythmic and heart rate control medications as ordered  - Monitor electrolytes and administer replacement therapy as ordered  Outcome: Progressing     Problem: GENITOURINARY - ADULT  Goal: Maintains or returns to baseline urinary function  Description: INTERVENTIONS:  - Assess urinary function  - Encourage oral fluids to ensure adequate hydration if ordered  - Administer IV fluids as ordered to ensure adequate hydration  - Administer ordered medications as needed  - Offer frequent toileting  - Follow urinary retention protocol if ordered  Outcome: Progressing  Goal: Absence of urinary retention  Description: INTERVENTIONS:  - Assess patient’s ability to void and empty bladder  - Monitor I/O  - Bladder scan as needed  - Discuss with physician/AP medications to alleviate retention as needed  - Discuss catheterization for long term situations as appropriate  Outcome: Progressing  Goal: Urinary catheter  remains patent  Description: INTERVENTIONS:  - Assess patency of urinary catheter  - If patient has a chronic armenta, consider changing catheter if non-functioning  - Follow guidelines for intermittent irrigation of non-functioning urinary catheter  Outcome: Progressing     Problem: METABOLIC, FLUID AND ELECTROLYTES - ADULT  Goal: Electrolytes maintained within normal limits  Description: INTERVENTIONS:  - Monitor labs and assess patient for signs and symptoms of electrolyte imbalances  - Administer electrolyte replacement as ordered  - Monitor response to electrolyte replacements, including repeat lab results as appropriate  - Instruct patient on fluid and nutrition as appropriate  Outcome: Progressing  Goal: Fluid balance maintained  Description: INTERVENTIONS:  - Monitor labs   - Monitor I/O and WT  - Instruct patient on fluid and nutrition as appropriate  - Assess for signs & symptoms of volume excess or deficit  Outcome: Progressing  Goal: Glucose maintained within target range  Description: INTERVENTIONS:  - Monitor Blood Glucose as ordered  - Assess for signs and symptoms of hyperglycemia and hypoglycemia  - Administer ordered medications to maintain glucose within target range  - Assess nutritional intake and initiate nutrition service referral as needed  Outcome: Progressing

## 2025-06-23 NOTE — DISCHARGE SUMMARY
Discharge Summary   Erik Cordon 49 y.o. male MRN: [id number]  [facility] Encounter: [id number]    Admission Date: 6/18/2025     Discharge Date:06/23/25    Attending:Willie Griffin MD     Consultants:Surgical critical care medicine    Admitting Diagnosis: Severe peripheral arterial disease (HCC) [I73.9]    Principle/ Secondary Diagnosis:  AOID/ PAD w/ LLE rest pain  Postoperative acute blood loss anemia secondary to procedural and dilutional loss on chronic anemia    Past Medical History[1]  Past Surgical History[2]    Procedures Performed:   6/18/2025 left iliofemoral bypass with 8 mm rifampin soaked dacryon graft.  Left iliofemoral endarterectomy with bovine patch angioplasty-Elias    Laboratory data at discharge:   Results from last 7 days   Lab Units 06/20/25  0434 06/19/25  0456 06/18/25  1626   WBC Thousand/uL 8.18 10.54* 13.79*   HEMOGLOBIN g/dL 10.7* 10.1* 10.9*   HEMATOCRIT % 32.7* 31.2* 33.2*   PLATELETS Thousands/uL 155 143* 163     Results from last 7 days   Lab Units 06/20/25  0434 06/19/25  0456 06/18/25  1626 06/18/25  1205   POTASSIUM mmol/L 4.1 4.0 3.9  --    CHLORIDE mmol/L 102 103 106  --    CO2 mmol/L 25 26 23  --    CO2, I-STAT mmol/L  --   --   --  20*   BUN mg/dL 14 11 11  --    CREATININE mg/dL 0.80 0.72 0.70  --    GLUCOSE, ISTAT mg/dl  --   --   --  145*   CALCIUM mg/dL 9.1 8.4 8.2*  --                Discharge instructions/Information to patient and family:   See after visit summary for information provided to patient and family.   Leg surgery instructions    Discharge Medications:  See after visit summary for reconciled discharge medications provided to patient and family.    Plavix discontinued  Continued antiplatelet-aspirin   Continued statin-Lipitor  Pain medication: Oxycodone 5 mg p.o. every 4 hours as needed severe pain (#30, Rx 0)    Hospital Course:   Erik Cordon is a 50y/o male smoker with GERD, anxiety, and aortic insufficiency who was  followed in the outpatient setting with vascular surgery.  He has known left EIA occlusion and rest pain.  He had complained of claudication symptoms in the left leg for approximately 1-2 years.  Over several weeks, his symptoms had progressed to pain at rest.  The pain seems more pronounced in the thigh but he also complained of discomfort in his left leg below the knee.  He reported being able to walk about 10 feet before the pain was so intense that he felt like he needed to stop.  Arterial duplex revealed left SHIREEN 0.49 with toe pressure of 30.  CTA demonstrated patent left EZEQUIEL but complete acute lesion of the left EIA with reconstitution of about the level of the inguinal ligament.  The left IIA was patent but with the ostium appearing stenotic.  Left CFA with moderate amount of occlusive disease at the bifurcation.  Right EZEQUIEL patent.  Left EIA with focal proximal stenosis but patent.  Right IIA is chronically occluded.  With these findings on history, physical exam, and imaging, the patient was recommended surgical intervention.  Prior to proceeding, he was seen by cardiology and deemed moderate risk.    On 6/18/2025, the patient was electively admitted to [facility] at which time he underwent left iliofemoral bypass with 8 mm rifampin soaked dacron graft and left iliofemoral endarterectomy with bovine patch angioplasty by Dr. Griffin.    Postoperatively, he was maintained in the PACU then was transferred to an ICU/stepdown bed where his overall management was assisted by our surgical critical care colleagues.  He was eventually able to be transitioned out to a medical surgical/telemetry/stepdown floor where he continued to convalesce for the remainder of his hospitalization.    By POD # 5, the patient remained neurovascularly intact with bilateral warm, well-perfused lower extremities with brisk capillary refill.  Bilateral DP and PT pulses were palpable bilaterally.  His incision was stable.   He was tolerating a diet.  He was having bowel function.  He was voiding spontaneously.  His pain was controlled with use of Tylenol and oxycodone.  He was ambulatory.  He was evaluated by physical therapy and Occupational Therapy who deemed him with minimal needs.  With the assistance of our case management colleagues, arrangements were made for the home health care to include PT/OT and nursing services.  The patient also requested additional DME to include shower chair, 3-1 commode, and rolling walker.  The patient was deemed appropriate and stable for discharge and was discharged in the care of his girlfriend on 6/23/2025.      Hospital course was complicated by the following:  Postoperative acute blood loss anemia secondary to procedural and dilutional loss on chronic anemia    Prior to discharge, the patient was given instructions for outpatient care and follow-up.  The patient has been instructed to call w/ any questions, changes, or concerns for the medical condition.    For further details of the hospitalization, please refer to the medical record.    Condition at Discharge: stable     Provisions for Follow-Up Care:  See after visit summary for information related to follow-up care and any pertinent home health orders.      Disposition: Home-- [facility] (RN, PT/ OT)    Planned Readmission: No    Discharge Statement   I spent 30 minutes discharging the patient. This time was spent on the day of discharge. I had direct contact with the patient on the day of discharge. Additional documentation is required if more than 30 minutes were spent on discharge.     Breana Rosenbaum PA-C  6/23/2025      This text is generated with voice recognition software. There may be translation, syntax,  or grammatical errors. If you have any questions, please contact the dictating provider.           [1]   Past Medical History:  Diagnosis Date    Anxiety 2019    Chronic pain disorder     to back down to knees    Difficulty  walking 2023    GERD (gastroesophageal reflux disease)     Shortness of breath     with ambulation   [2]   Past Surgical History:  Procedure Laterality Date    LUMBAR EPIDURAL INJECTION      pain shot    MULTIPLE TOOTH EXTRACTIONS      CT BYPASS W/VEIN AORTOILIAC Left 6/18/2025    Procedure: LEFT ILIAC TO COMMON FEMORAL ARTERY BYPASS;  Surgeon: Willie Griffin MD;  Location: [facility];  Service: Vascular    CT TEAEC W/WO PATCH GRAFT COMMON FEMORAL Left 6/18/2025    Procedure: ENDARTERECTOMY ARTERIAL FEMORAL;  Surgeon: Willie Griffin MD;  Location: [facility];  Service: Vascular

## 2025-06-23 NOTE — ASSESSMENT & PLAN NOTE
Patient s/p L ileofemoral bypass w/ 8mm rifampin-soaked Dacron graft, L ileofemoral endarterectomy w/ bovine pericardium patch angioplasty w/ Dr. Griffin on 6/18. Progressing well post-operatively. Patient medically appropriate for discharge with outpatient follow up.    Plan:  -regular diet, ensure supplements  -no IVF needs  -pain & nausea prn  -reassurance repeated and expected goals of care discussed  -encourage pulm toilet:   -IS q1h while awake   -OOB to chair   -ambulate TID with assistance  -statin  -ASA   -no longer needs previously prescribed plavix  -SQH VTE ppx  -PT/OT cleared with level 3 (minimum) outpatient resources  -CM consulted for home health referrals, appreciate assistance  -anticipate discharge home w/ HH services in next 24 hours

## 2025-06-23 NOTE — UTILIZATION REVIEW
Continued Stay Review    Date: 06/21                          Current Patient Class: Inpatient  Current Level of Care: MS    HPI:49 y.o. male initially admitted on 06/18   Current Diagnosis: L external iliac artery occlusion and LLE rest pain s/p 6/18 elective L extended CFEA, L EZEQUIEL-CFA bypass w/ rifampin soaked dacron     Assessment/Plan: No acute events overnight. Pain is well controlled. Tolerating PO. Denies any LLE pain or M/S deficits. Has been OOB.   On exam, Palpable 2+ dorsalis pedis pulse on the left lower extremity. Has some scrotal edema so we will get mesh underwear and scrotal support. Cont PO pain control PRN. Regular diet. Continue aspirin and statin. Bowel regimen. Encourage ambulation, PT/OT, ISB. Must do stairs today as pt has multiple flights of stairs at home. Slowly progressing.       Medications:   Scheduled Medications:  acetaminophen, 975 mg, Oral, Q8H NEIL  aspirin, 81 mg, Oral, Daily  atorvastatin, 40 mg, Oral, Daily With Dinner  docusate sodium, 100 mg, Oral, BID  famotidine, 20 mg, Oral, BID  heparin (porcine), 5,000 Units, Subcutaneous, Q8H NEIL  magnesium citrate, 296 mL, Oral, Daily  polyethylene glycol, 17 g, Oral, Daily  senna, 1 tablet, Oral, Daily      Continuous IV Infusions: none     PRN Meds:  albuterol, 2 puff, Inhalation, Q6H PRN  bisacodyl, 10 mg, Rectal, Daily PRN  calcium carbonate, 500 mg, Oral, Daily PRN 06/21 x 1  ondansetron, 4 mg, Intravenous, Q6H PRN  oxyCODONE, 5 mg, Oral, Q4H PRN  oxyCODONE, 2.5 mg, Oral, Q4H PRN  oxyCODONE (ROXICODONE) immediate release tablet 10 mg  q4h po prn 06/21 x 3    Discharge Plan: TBD    Vital Signs (last 3 days)       Date/Time Temp Pulse Resp BP MAP (mmHg) SpO2 O2 Device Patient Position - Orthostatic VS Brandon Coma Scale Score CIWA-Ar Total Pain    06/23/25 1026 -- -- -- -- -- -- -- -- -- -- No Pain    06/23/25 0839 -- -- -- -- -- -- -- -- -- -- 10 - Worst Possible Pain    06/23/25 0800 -- -- -- -- -- -- None (Room air) -- 15 -- --     06/23/25 07:06:03 99.3 °F (37.4 °C) -- 20 118/75 89 -- -- -- -- -- --    06/23/25 02:08:47 -- 100 -- 111/66 81 -- -- -- -- -- --    06/22/25 23:25:27 99.8 °F (37.7 °C) -- 18 135/80 98 -- -- -- -- -- --    06/22/25 2250 -- -- -- -- -- -- -- -- -- -- 10 - Worst Possible Pain    06/22/25 2158 -- -- -- -- -- -- -- -- -- -- 10 - Worst Possible Pain    06/22/25 2000 -- -- -- -- -- -- -- -- 15 -- --    06/22/25 15:20:23 99.5 °F (37.5 °C) 93 20 127/76 93 97 % -- Sitting -- -- --    06/22/25 1309 -- -- -- -- -- -- -- -- -- -- 10 - Worst Possible Pain    06/22/25 1156 -- -- -- -- -- -- -- -- -- -- 10 - Worst Possible Pain    06/22/25 1010 -- -- -- -- -- -- -- -- -- -- 10 - Worst Possible Pain    06/22/25 0952 -- -- -- -- -- -- -- -- -- -- No Pain    06/22/25 0800 -- -- -- -- -- -- None (Room air) -- 15 -- --    06/22/25 0756 -- -- -- -- -- -- -- -- -- -- 10 - Worst Possible Pain    06/22/25 07:09:28 99.5 °F (37.5 °C) -- 20 119/81 94 -- -- Lying -- -- --    06/22/25 0006 -- -- -- -- -- -- -- -- -- -- 9    06/21/25 21:18:28 98.6 °F (37 °C) 99 20 124/72 89 97 % None (Room air) -- -- -- --    06/21/25 2112 -- -- -- -- -- -- -- -- -- -- 8    06/21/25 1932 -- -- -- -- -- 97 % None (Room air) -- 15 -- 7    06/21/25 1712 -- -- -- -- -- -- -- -- -- -- 8    06/21/25 15:08:53 98.5 °F (36.9 °C) 105 16 130/72 91 98 % None (Room air) Lying -- -- --    06/21/25 15:08:26 98.5 °F (36.9 °C) -- -- 130/72 91 -- -- -- -- -- --    06/21/25 1345 -- -- -- -- -- -- -- -- -- -- 8    06/21/25 1340 -- -- -- -- -- -- -- -- -- -- 10 - Worst Possible Pain    06/21/25 1149 -- -- -- -- -- -- -- -- -- -- 10 - Worst Possible Pain    06/21/25 0958 -- -- -- -- -- -- -- -- -- -- No Pain    06/21/25 0800 -- -- -- -- -- -- None (Room air) -- 15 -- --    06/21/25 0736 -- -- -- -- -- 98 % None (Room air) -- -- -- --    06/21/25 07:18:02 98.7 °F (37.1 °C) 107 16 129/81 97 96 % -- Lying -- 7 --    06/21/25 0003 -- -- -- -- -- -- -- -- -- -- 9    06/21/25 0000 -- -- --  -- -- -- -- -- 15 -- --    06/20/25 22:03:10 98.8 °F (37.1 °C) 105 17 117/72 87 99 % None (Room air) Lying -- -- --    06/20/25 2029 -- -- -- -- -- -- -- -- 15 -- 5    06/20/25 20:28:35 -- -- -- 115/66 82 -- -- -- -- -- --    06/20/25 1821 -- -- -- -- -- -- -- -- -- -- 10 - Worst Possible Pain    06/20/25 15:00:02 98.3 °F (36.8 °C) 121 24 112/80 91 98 % -- Sitting -- -- --    06/20/25 1420 -- -- -- -- -- -- -- -- -- -- 10 - Worst Possible Pain    06/20/25 1314 -- -- -- -- -- -- -- -- -- -- Med Not Given for Pain - for MAR use only    06/20/25 1300 -- 100 28 112/75 90 98 % None (Room air) Sitting -- -- 3    06/20/25 1200 -- -- -- -- -- 97 % -- -- 15 -- --    06/20/25 0932 -- -- -- -- -- -- -- -- -- -- 10 - Worst Possible Pain    06/20/25 0900 -- 78 17 107/58 79 97 % None (Room air) Sitting -- -- --    06/20/25 0725 -- -- -- -- -- 97 % None (Room air) -- 15 -- --    06/20/25 0538 -- -- -- -- -- -- -- -- -- -- 10 - Worst Possible Pain    06/20/25 0537 -- -- -- -- -- -- -- -- -- -- 10 - Worst Possible Pain    06/20/25 0200 98.7 °F (37.1 °C) 92 18 112/57 79 98 % -- -- -- -- No Pain    06/20/25 0100 -- 74 22 119/58 83 97 % -- -- -- -- --    06/20/25 0000 -- 68 20 130/62 89 97 % None (Room air) Lying 15 -- --          Weight (last 2 days)       Date/Time Weight    06/21/25 0955 50.8 (112)            Pertinent Labs/Diagnostic Results:   Radiology:  VAS SHIREEN and waveform analysis, multiple levels   Final Interpretation by Alex Salazar DO (06/19 1448)        Cardiology:  ECG 12 lead   Final Result by Brian Parra DO (06/20 0758)   Normal sinus rhythm   Nonspecific ST and T wave abnormality   Abnormal ECG   Confirmed by Brian Parra ([id number]) on 6/20/2025 7:44:54 AM        GI:  No orders to display           Results from last 7 days   Lab Units 06/20/25  0434 06/19/25  0456 06/18/25  1626 06/18/25  1205 06/18/25  0908   WBC Thousand/uL 8.18 10.54* 13.79*  --   --    HEMOGLOBIN g/dL 10.7* 10.1* 10.9*  --   --    I  "STAT HEMOGLOBIN g/dl  --   --   --  11.2* 10.5*   HEMATOCRIT % 32.7* 31.2* 33.2*  --   --    HEMATOCRIT, ISTAT %  --   --   --  33* 31*   PLATELETS Thousands/uL 155 143* 163  --   --    BANDS PCT %  --   --  1  --   --          Results from last 7 days   Lab Units 06/20/25 0434 06/19/25  0456 06/18/25  1626 06/18/25  1205 06/18/25  0908   SODIUM mmol/L 136 135 137  --   --    POTASSIUM mmol/L 4.1 4.0 3.9  --   --    CHLORIDE mmol/L 102 103 106  --   --    CO2 mmol/L 25 26 23  --   --    CO2, I-STAT mmol/L  --   --   --  20* 24   ANION GAP mmol/L 9 6 8  --   --    BUN mg/dL 14 11 11  --   --    CREATININE mg/dL 0.80 0.72 0.70  --   --    EGFR ml/min/1.73sq m 106 111 112  --   --    CALCIUM mg/dL 9.1 8.4 8.2*  --   --    CALCIUM, IONIZED mmol/L  --  1.07* 1.02*  --   --    CALCIUM, IONIZED, ISTAT mmol/L  --   --   --  1.01* 1.15   MAGNESIUM mg/dL  --  2.2 1.4*  --   --    PHOSPHORUS mg/dL  --  4.2 2.5*  --   --              Results from last 7 days   Lab Units 06/20/25 0434 06/19/25 0456 06/18/25  1626   GLUCOSE RANDOM mg/dL 87 95 141*             No results found for: \"BETA-HYDROXYBUTYRATE\"           Results from last 7 days   Lab Units 06/18/25  1205 06/18/25  0908   I STAT BASE EXC mmol/L -5* -2   I STAT O2 SAT % 100* 100*   ISTAT PH ART  7.372 7.390   I STAT ART PCO2 mm HG 33.2* 37.2   I STAT ART PO2 mm .0* 246.0*   I STAT ART HCO3 mmol/L 19.3* 22.5                                                     Results from last 7 days   Lab Units 06/20/25  0741   UNIT PRODUCT CODE  [id number]  [id number]   UNIT NUMBER  [id number]  [id number]   UNITABO  A  A   UNITRH  POS  POS   CROSSMATCH  Compatible  Compatible   UNIT DISPENSE STATUS  Return to Inv  Return to Inv   UNIT PRODUCT VOL mL 350  350           Network Utilization Review Department  ATTENTION: Please call with any questions or concerns to [phone] and carefully listen to the prompts so that you are directed to the right person. All " voicemails are confidential.   For Discharge needs, contact Care Management DC Support Team at [phone] opt. 2  Send all requests for admission clinical reviews, approved or denied determinations and any other requests to dedicated fax number below belonging to the campus where the patient is receiving treatment. List of dedicated fax numbers for the Facilities:  FACILITY NAME UR FAX NUMBER   ADMISSION DENIALS (Administrative/Medical Necessity) [phone]   DISCHARGE SUPPORT TEAM (NETWORK) [phone]   PARENT CHILD HEALTH (Maternity/NICU/Pediatrics) [phone]   [facility] [phone]   [facility] [phone]   [facility] [phone]   [facility] [phone]   [facility] [phone]   [facility] [phone]   [facility] [phone]   [facility] [phone]   [facility] [phone]   [facility] [phone]   [facility] [phone]   [facility] [phone]

## 2025-06-23 NOTE — ASSESSMENT & PLAN NOTE
Recommend continued smoking cessation after discharge. Recommend avoidance of nicotine products in setting of bypass.

## 2025-06-23 NOTE — PROGRESS NOTES
Virtual Regular Visit  Name: Erik Cordon      : 1975      MRN: [id number]  Encounter Provider: Erendira Falk DO  Encounter Date: 2025   Encounter department: [facility] [place]  :  Assessment & Plan  Iron deficiency anemia, unspecified iron deficiency anemia type  Noted on recent studies. Pt will be started on otc supplementation.       Orders:    CBC and Platelet    Hyperhidrosis  Likely anxiety related will evaluate for thyroid dyscrasia   Orders:    TSH, 3rd generation with Free T4 reflex                 History of Present Illness     Pt c/o sweaty palms and feet. Has been worse since recent surgery.           Objective   There were no vitals taken for this visit.    Physical Exam  HENT:      Head: Normocephalic.     Cardiovascular:      Rate and Rhythm: Normal rate.   Pulmonary:      Effort: Pulmonary effort is normal.     Neurological:      Mental Status: He is alert and oriented to person, place, and time.      Gait: Gait abnormal.     Psychiatric:         Mood and Affect: Mood is anxious.         Administrative Statements   Encounter provider Erendira Falk DO    The Patient is located at Home and in the following state in which I hold an active license [place].    The patient was identified by name and date of birth. Erik Cordon was informed that this is a telemedicine visit and that the visit is being conducted through the Epic Embedded platform. He agrees to proceed..  My office door was closed. No one else was in the room.  He acknowledged consent and understanding of privacy and security of the video platform. The patient has agreed to participate and understands they can discontinue the visit at any time.    I have spent a total time of 12 minutes in caring for this patient on the day of the visit/encounter including Risks and benefits of tx options and Instructions for management, not including the time spent for establishing the  audio/video connection.

## 2025-06-23 NOTE — PROGRESS NOTES
Progress Note - Vascular Surgery   Name: Erik Cordon 49 y.o. male I MRN: [id number]  Unit/Bed#: [facility] 507-01 I Date of Admission: 6/18/2025   Date of Service: 6/23/2025 I Hospital Day: 5    Assessment & Plan  Rest pain of left lower extremity due to atherosclerosis (HCC)  Severe peripheral arterial disease (HCC)  Patient s/p L ileofemoral bypass w/ 8mm rifampin-soaked Dacron graft, L ileofemoral endarterectomy w/ bovine pericardium patch angioplasty w/ Dr. Griffin on 6/18. Progressing well post-operatively. Patient medically appropriate for discharge with outpatient follow up.    Plan:  -regular diet, ensure supplements  -no IVF needs  -pain & nausea prn  -reassurance repeated and expected goals of care discussed  -encourage pulm toilet:   -IS q1h while awake   -OOB to chair   -ambulate TID with assistance  -statin  -ASA   -no longer needs previously prescribed plavix  -SQH VTE ppx  -PT/OT cleared with level 3 (minimum) outpatient resources  -CM consulted for home health referrals, appreciate assistance  -anticipate discharge home w/ HH services in next 24 hours  GERD (gastroesophageal reflux disease)  Well controlled.    -continue NEIL pepcid  -tums prn  Nicotine dependence with current use  Recommend continued smoking cessation after discharge. Recommend avoidance of nicotine products in setting of bypass.      24 Hour Events : NAEON. Afebrile. VSS. On room air. 2x bowel movements.  Subjective : Patient reports improvement in bloating with BM. This morning reporting subjective pain in RLE. Persistent tenderness around incisions. Significant other at bedside. Questions answered.    Objective :  Temp:  [99.5 °F (37.5 °C)-99.8 °F (37.7 °C)] 99.8 °F (37.7 °C)  HR:  [] 100  BP: (111-135)/(66-81) 111/66  Resp:  [18-20] 18  SpO2:  [97 %] 97 %  O2 Device: None (Room air)     I/O         06/21 0701  06/22 0700 06/22 0701  06/23 0700    P.O. 120 600    Total Intake(mL/kg) 120 (2.4) 600 (11.8)    Urine  (mL/kg/hr) 450 (0.4) 125 (0.1)    Stool 0     Total Output 450 125    Net -330 +475          Unmeasured Urine Occurrence 1 x     Unmeasured Stool Occurrence 0 x           Physical Exam  Vitals and nursing note reviewed.   Constitutional:       General: He is not in acute distress.     Appearance: Normal appearance. He is well-developed and normal weight. He is not ill-appearing.   HENT:      Head: Normocephalic and atraumatic.      Right Ear: External ear normal.      Left Ear: External ear normal.      Nose: Nose normal.      Mouth/Throat:      Mouth: Mucous membranes are moist.     Eyes:      General: No scleral icterus.     Extraocular Movements: Extraocular movements intact.      Conjunctiva/sclera: Conjunctivae normal.       Cardiovascular:      Rate and Rhythm: Normal rate.      Pulses:           Dorsalis pedis pulses are 2+ on the right side and 2+ on the left side.        Posterior tibial pulses are 2+ on the right side and 2+ on the left side.   Pulmonary:      Effort: Pulmonary effort is normal. No respiratory distress.   Abdominal:      General: There is no distension.      Palpations: Abdomen is soft. There is no mass.      Tenderness: There is no abdominal tenderness.     Musculoskeletal:         General: No swelling or tenderness. Normal range of motion.      Cervical back: Neck supple.      Right lower leg: No edema.      Left lower leg: No edema.      Comments: Bilateral lower extremities symmetric, warm, well-perfused, and with brisk cap refill. No sores identified on soles of feet.     Skin:     General: Skin is warm and dry.      Capillary Refill: Capillary refill takes less than 2 seconds.      Coloration: Skin is not pale.     Neurological:      General: No focal deficit present.      Mental Status: He is alert. Mental status is at baseline.      Sensory: No sensory deficit.     Psychiatric:         Mood and Affect: Mood normal.         Behavior: Behavior normal.         Lab Results: I have  "reviewed the following results:  CBC with diff:   Lab Results   Component Value Date    WBC 8.18 06/20/2025    HGB 10.7 (L) 06/20/2025    HCT 32.7 (L) 06/20/2025    MCV 93 06/20/2025     06/20/2025    RBC 3.51 (L) 06/20/2025    MCH 30.5 06/20/2025    MCHC 32.7 06/20/2025    RDW 13.2 06/20/2025    MPV 10.9 06/20/2025    NRBC 0 06/20/2025   ,   BMP/CMP:  Lab Results   Component Value Date    SODIUM 136 06/20/2025    K 4.1 06/20/2025     06/20/2025    CO2 25 06/20/2025    CO2 20 (L) 06/18/2025    BUN 14 06/20/2025    CREATININE 0.80 06/20/2025    GLUCOSE 145 (H) 06/18/2025    CALCIUM 9.1 06/20/2025    AST 21 03/20/2025    ALT 24 03/20/2025    ALKPHOS 81 03/20/2025    EGFR 106 06/20/2025   ,   Lipid Panel: No results found for: \"CHOL\",   Coags:   Lab Results   Component Value Date    PTT 28 02/20/2025    INR 1.00 03/20/2025   ,   Blood Culture: No results found for: \"BLOODCX\",   Urinalysis: No results found for: \"COLORU\", \"CLARITYU\", \"SPECGRAV\", \"PHUR\", \"LEUKOCYTESUR\", \"NITRITE\", \"PROTEINUA\", \"GLUCOSEU\", \"KETONESU\", \"BILIRUBINUR\", \"BLOODU\",   Urine Culture: No results found for: \"URINECX\",   Wound Culure: No results found for: \"WOUNDCULT\"    Imaging Results Review: No pertinent imaging studies reviewed.  Other Study Results Review: No additional pertinent studies reviewed.    VTE Prophylaxis: VTE covered by:  heparin (porcine), Subcutaneous, 5,000 Units at 06/23/25 0544      "

## 2025-06-23 NOTE — TELEPHONE ENCOUNTER
Patient spouse called in asking if virtual appointment could be turned into in person appt today-  Warm transferred to Josselyn in the office for further assistance

## 2025-06-24 ENCOUNTER — HOME CARE VISIT (OUTPATIENT)
Dept: HOME HEALTH SERVICES | Facility: HOME HEALTHCARE | Age: 50
End: 2025-06-24

## 2025-06-24 ENCOUNTER — NURSE TRIAGE (OUTPATIENT)
Age: 50
End: 2025-06-24

## 2025-06-24 ENCOUNTER — TRANSITIONAL CARE MANAGEMENT (OUTPATIENT)
Age: 50
End: 2025-06-24

## 2025-06-24 DIAGNOSIS — G89.18 POST-OPERATIVE PAIN: Primary | ICD-10-CM

## 2025-06-24 LAB
DME PARACHUTE DELIVERY DATE ACTUAL: NORMAL
DME PARACHUTE DELIVERY DATE ACTUAL: NORMAL
DME PARACHUTE DELIVERY DATE REQUESTED: NORMAL
DME PARACHUTE DELIVERY DATE REQUESTED: NORMAL
DME PARACHUTE DELIVERY NOTE: NORMAL
DME PARACHUTE ITEM DESCRIPTION: NORMAL
DME PARACHUTE ITEM DESCRIPTION: NORMAL
DME PARACHUTE ORDER STATUS: NORMAL
DME PARACHUTE ORDER STATUS: NORMAL
DME PARACHUTE SUPPLIER NAME: NORMAL
DME PARACHUTE SUPPLIER NAME: NORMAL
DME PARACHUTE SUPPLIER PHONE: NORMAL
DME PARACHUTE SUPPLIER PHONE: NORMAL

## 2025-06-24 NOTE — TELEPHONE ENCOUNTER
"REASON FOR CONVERSATION: Post-op Problem    SYMPTOMS: Pt and pt's wife Leila called in stating that pt is having intense pain. Reports pain in the knees, legs, groin, back and arms. Leila states this pain was reported when he was in the hospital. Pt is unable to sleep because of pain and barely can walk. Pt states he has left numbness and swelling.   Leila reports that pt is taking  750mg tylenol every 4 hours and Oxycodone 5mg every 4 hours     Leila is going to send a [employer] message with pictures.     OTHER HEALTH INFORMATION:     on 6/18   LEFT ILIAC TO COMMON FEMORAL ARTERY BYPASS (Left: Abdomen)       ENDARTERECTOMY ARTERIAL FEMORAL (Left: Leg Lower)     PROTOCOL DISPOSITION: Discuss With PCP and Callback by Nurse Within 1 Hour    CARE ADVICE PROVIDED: Advised that a message will be sent and we will give them a call back    PRACTICE FOLLOW-UP: Please advise regarding pain     Reason for Disposition   SEVERE post-op pain (e.g., excruciating, pain scale 8-10) that is not controlled with pain medications    Answer Assessment - Initial Assessment Questions  1. SYMPTOM: \"What's the main symptom you're concerned about?\" (e.g., pain, fever, vomiting)      pain  2. ONSET: \"When did pain  start?\"      6/18 since in the hospital   3. SURGERY: \"What surgery did you have?\"      LEFT ILIAC TO COMMON FEMORAL ARTERY BYPASS (Left: Abdomen)       ENDARTERECTOMY ARTERIAL FEMORAL (Left: Leg Lower)     4. DATE of SURGERY: \"When was the surgery?\"       6/18  5. ANESTHESIA: \"What type of anesthesia did you have?\" (e.g., general, spinal, epidural, local)      General   7. PAIN: \"Is there any pain?\" If Yes, ask: \"How bad is it?\"  (Scale 1-10; or mild, moderate, severe)      10 pain level    Protocols used: Post-Op Symptoms and Questions-Adult-OH    "

## 2025-06-24 NOTE — TELEPHONE ENCOUNTER
"Called and spoke with patient's wife, Leila, states that pain is in L leg (very severe) and also reports new onset of pain in R foot. Reports \"large lump\" in L groin. States that she is not sure if he has fever or chills.   "

## 2025-06-24 NOTE — UTILIZATION REVIEW
NOTIFICATION OF ADMISSION DISCHARGE   This is a Notification of Discharge from [facility]. Please be advised that this patient has been discharge from our facility. Below you will find the admission and discharge date and time including the patient’s disposition.   UTILIZATION REVIEW CONTACT:  Utilization Review Assistants  Network Utilization Review Department  Phone: [phone] x carefully listen to the prompts. All voicemails are confidential.  Email: [email or web address]     ADMISSION INFORMATION  PRESENTATION DATE: 6/18/2025  6:10 AM  OBERVATION ADMISSION DATE: N/A  INPATIENT ADMISSION DATE: 6/18/25  1:29 PM   DISCHARGE DATE: 6/23/2025 11:38 AM   DISPOSITION:Home with Home Health Care    [facility] Utilization Review Department  ATTENTION: Please call with any questions or concerns to [phone] and carefully listen to the prompts so that you are directed to the right person. All voicemails are confidential.   For Discharge needs, contact Care Management DC Support Team at [phone] opt. 2  Send all requests for admission clinical reviews, approved or denied determinations and any other requests to dedicated fax number below belonging to the campus where the patient is receiving treatment. List of dedicated fax numbers for the Facilities:  FACILITY NAME UR FAX NUMBER   ADMISSION DENIALS (Administrative/Medical Necessity) [phone]   DISCHARGE SUPPORT TEAM ([facility]) [phone]   PARENT CHILD HEALTH (Maternity/NICU/Pediatrics) [phone]   [facility] [phone]   [facility] [phone]   [facility] [phone]   [facility] [phone]   [facility] [phone]   [facility] [phone]   [facility] [phone]   [facility] [phone]    [facility] [phone]   [facility] [phone]   [facility] [phone]   [facility] [phone]

## 2025-06-24 NOTE — TELEPHONE ENCOUNTER
Is the pain in both legs? Any incision concerns? Fevers or chills?    Clerical- can we please move up his post op appointment so we can evaluate?

## 2025-06-24 NOTE — TELEPHONE ENCOUNTER
PSA study ordered. Pt is going to [facility] at 11:45 AM. Got approval from Johine CHARLES to take in-patient spot. Pt's wife would like to know if there is anything they can do for the pain?

## 2025-06-24 NOTE — TELEPHONE ENCOUNTER
Called and s/w Laine, she stated that it is getting larger. Pt states that he can feel throbbing at the lump site that worsens when he stands.

## 2025-06-25 ENCOUNTER — HOSPITAL ENCOUNTER (OUTPATIENT)
Dept: VASCULAR ULTRASOUND | Facility: HOSPITAL | Age: 50
Discharge: HOME | End: 2025-06-25
Payer: COMMERCIAL

## 2025-06-25 ENCOUNTER — HOME CARE VISIT (OUTPATIENT)
Dept: HOME HEALTH SERVICES | Facility: HOME HEALTHCARE | Age: 50
End: 2025-06-25
Payer: COMMERCIAL

## 2025-06-25 DIAGNOSIS — I70.229 REST PAIN OF LOWER EXTREMITY DUE TO ATHEROSCLEROSIS (HCC): Primary | ICD-10-CM

## 2025-06-25 DIAGNOSIS — I73.9 SEVERE PERIPHERAL ARTERIAL DISEASE (HCC): ICD-10-CM

## 2025-06-25 DIAGNOSIS — G89.18 POST-OPERATIVE PAIN: ICD-10-CM

## 2025-06-25 LAB
DME PARACHUTE DELIVERY DATE ACTUAL: NORMAL
DME PARACHUTE DELIVERY DATE REQUESTED: NORMAL
DME PARACHUTE ITEM DESCRIPTION: NORMAL
DME PARACHUTE ORDER STATUS: NORMAL
DME PARACHUTE SUPPLIER NAME: NORMAL
DME PARACHUTE SUPPLIER PHONE: NORMAL

## 2025-06-25 PROCEDURE — 400013 VN SOC

## 2025-06-25 PROCEDURE — 93926 LOWER EXTREMITY STUDY: CPT

## 2025-06-25 PROCEDURE — 93926 LOWER EXTREMITY STUDY: CPT | Performed by: SURGERY

## 2025-06-25 PROCEDURE — G0299 HHS/HOSPICE OF RN EA 15 MIN: HCPCS

## 2025-06-25 NOTE — CASE COMMUNICATION
Medication discrepancies or Major drug interactions Pt does not have aspirin, senna, colace or Miralax in the home. --Wife to   Abnormal clinical findings  Severe constant pain 10/10 to the LLE,  above knee to the groin area. L groin firm, discolored, swollen.  Pt jumps off the bed just brushing lightly to the L  thigh area.  Very poor appetite.    This report is informational only, no response is needed  [facility] has Admit sarai your patient to Home Health service with the following disciplines: SN, PT, OT and MSW.  SN added MSW per pt/ wife request.  Pt unable to bathe self adequately due to pain, weakness. Wife is returning to work next week. Pt has no other caregiver . Pt and caregiver requesting information on services avaiable to pt .   Patient stated goals of care To get better and to get rid of the pain  Potential barriers to goal achievement  pain   Primary focus of home health care: Post surgery  Anticipated visit pattern and next visit date  2x weekly x 2 weeks then 1x week x 1 week  Thank you for allowing us to participate in the care of your patient.      Ewelina Sykes RN

## 2025-06-25 NOTE — TELEPHONE ENCOUNTER
I do not recommend ibuprofen after vascular surgery. Please stick to the recommendations which were made, such as: acetaminophen, Oxycodone,  cool compresses and elevation. Thanks, -RD

## 2025-06-25 NOTE — TELEPHONE ENCOUNTER
"Triage call. Patient of Dr. Griffin who called yesterday d/t \"severe\" LLE pain and lump in L groin    -POD# 7 after L fem endart and LLE Dacron bypass  -Was ordered for du which was performed today  -Reached out to [facility] tech. Per tech, Du looks ok. No evidence of groin pseudoaneurysm.  There is some hematoma at the distal bypass. Report not completed    Clinical:  - Please let patient know that the Doppler did not show any serious findings.  - He had a major surgery, so it will take some time for pain to settle down, please find out if he is feeling a little better today  - Recommend continuation of the acetaminophen and oxycodone for pain control  - Also remind him to elevate the leg.  He can also use cool compresses for 15 minutes on and 15 minutes off which might help with some of the leg pain.  - Please reach out if any other concerns  - If he still needs additional pain control, we can check with Dr. Griffin ?gabapentin 100 TID    CC:  Elias to review  "

## 2025-06-25 NOTE — TELEPHONE ENCOUNTER
Patient's wife calling requesting refills on medications on his list that he was provided in the hospital.

## 2025-06-25 NOTE — TELEPHONE ENCOUNTER
Called and s/w Leila, explained that he should be evaluated by ED if needed. She refused to go back to ED.  She would like to know if he can take ibuprofen along with the tylenol and oxy.

## 2025-06-25 NOTE — TELEPHONE ENCOUNTER
"Spouse called back attempted to relay message from Evy, she continued to cut me off saying [facility]NA there today and they will tell you he needs something for pain. I managed to relay the message to her and she said he does all of those recommendations, and nothing is helping and again, \"needs something for pain.\"   She was asking why would we order Neurontin for nerve pain and that doesn't help anyway and hung up the phone.   "

## 2025-06-26 ENCOUNTER — HOME CARE VISIT (OUTPATIENT)
Dept: HOME HEALTH SERVICES | Facility: HOME HEALTHCARE | Age: 50
End: 2025-06-26
Payer: COMMERCIAL

## 2025-06-26 VITALS
SYSTOLIC BLOOD PRESSURE: 128 MMHG | DIASTOLIC BLOOD PRESSURE: 72 MMHG | OXYGEN SATURATION: 100 % | RESPIRATION RATE: 20 BRPM | HEART RATE: 70 BPM | TEMPERATURE: 98.4 F

## 2025-06-26 RX ORDER — ATORVASTATIN CALCIUM 40 MG/1
40 TABLET, FILM COATED ORAL DAILY
Qty: 90 TABLET | Refills: 0 | Status: SHIPPED | OUTPATIENT
Start: 2025-06-26

## 2025-06-26 NOTE — TELEPHONE ENCOUNTER
Called and s/w Leila, informed her not to have Erik take ibuprofen, acetaminophen, and oxycodone. Gave instructions to elevate leg and use cool compresses to help with pain relief. She stated that Erik is still in pain and not sleeping because of it. Advised her to take him to ED for further evaluation. She stated that he did not want to go to ED. She did verbalize understanding of medication instructions, the use of cool compresses, and elevating his legs.

## 2025-06-27 ENCOUNTER — TELEPHONE (OUTPATIENT)
Dept: VASCULAR SURGERY | Facility: CLINIC | Age: 50
End: 2025-06-27

## 2025-06-27 ENCOUNTER — HOME CARE VISIT (OUTPATIENT)
Dept: HOME HEALTH SERVICES | Facility: HOME HEALTHCARE | Age: 50
End: 2025-06-27
Payer: COMMERCIAL

## 2025-06-27 VITALS
RESPIRATION RATE: 18 BRPM | DIASTOLIC BLOOD PRESSURE: 60 MMHG | OXYGEN SATURATION: 98 % | SYSTOLIC BLOOD PRESSURE: 102 MMHG | HEART RATE: 78 BPM | TEMPERATURE: 97.9 F

## 2025-06-27 DIAGNOSIS — I73.9 SEVERE PERIPHERAL ARTERIAL DISEASE (HCC): ICD-10-CM

## 2025-06-27 DIAGNOSIS — I70.229 REST PAIN OF LOWER EXTREMITY DUE TO ATHEROSCLEROSIS (HCC): ICD-10-CM

## 2025-06-27 PROCEDURE — G0300 HHS/HOSPICE OF LPN EA 15 MIN: HCPCS

## 2025-06-27 RX ORDER — ASPIRIN 81 MG/1
81 TABLET ORAL DAILY
Qty: 30 TABLET | Refills: 0 | Status: SHIPPED | OUTPATIENT
Start: 2025-06-27 | End: 2025-07-27

## 2025-06-27 RX ORDER — ACETAMINOPHEN 325 MG/1
975 TABLET ORAL EVERY 8 HOURS PRN
Qty: 30 TABLET | Refills: 0 | Status: SHIPPED | OUTPATIENT
Start: 2025-06-27

## 2025-06-27 RX ORDER — POLYETHYLENE GLYCOL 3350 17 G/17G
17 POWDER, FOR SOLUTION ORAL DAILY PRN
Qty: 10 EACH | Refills: 0 | Status: SHIPPED | OUTPATIENT
Start: 2025-06-27

## 2025-06-27 RX ORDER — DOCUSATE SODIUM 100 MG/1
100 CAPSULE, LIQUID FILLED ORAL 2 TIMES DAILY
Qty: 60 CAPSULE | Refills: 0 | Status: SHIPPED | OUTPATIENT
Start: 2025-06-27

## 2025-06-27 RX ORDER — SENNOSIDES 8.6 MG
8.6 TABLET ORAL DAILY
Qty: 30 TABLET | Refills: 0 | Status: SHIPPED | OUTPATIENT
Start: 2025-06-27

## 2025-06-27 NOTE — TELEPHONE ENCOUNTER
Vascular Nurse Navigator Post Op Call    Procedure: - Left iliofemoral bypass with 8 mm rifampin soaked Dacron graft  - Left iliofemoral endarterectomy with bovine pericardium patch angioplasty    Date of Procedure: 6/18/25    Surgeon:   * Willie Griffin MD - Primary     * Neil Lai MD - Assisting     * Guy Stewart MD - Assisting    Discharge Date: 6/23/25    Discharge Disposition: Home with [facility]    Leg Weakness?: No    Leg Swelling?: No    Leg Numbness?: No    Chest Pain?: No    Shortness of Breath?: No    Orthopnea?: No    Anticoagulation pt was discharged on post op?: Aspirin    Statin pt was discharged on post op?:  Lipitor (atorvastatin)    Bleeding?: No    Uncontrolled Pain?: No    Incision Concerns?: No    Fever or Chills?: No      Reviewed discharge instructions and incision care with patient.      NEXT OFFICE VISIT SCHEDULED:  7/1/25 at 3:30 pm with Mandy Ramirez PA-C at [facility]    Transportation Confirmed?: Yes      Any further questions/concerns?  Spoke with patient's significant other [place] in regards to above as she was out picking up his medications at the pharmacy.  She stated patient continues with pain in his incision area and in his leg.  She described leg pain as reperfusion pain and informed her this is a normal response of the type of surgery he had.  She stated the nurse was out to see him today and did not have any concerns.  Reviewed incision care with her- wash daily with soap and water.  All questions answered.

## 2025-06-30 ENCOUNTER — HOME CARE VISIT (OUTPATIENT)
Dept: HOME HEALTH SERVICES | Facility: HOME HEALTHCARE | Age: 50
End: 2025-06-30
Attending: PHYSICIAN ASSISTANT
Payer: COMMERCIAL

## 2025-06-30 ENCOUNTER — HOME CARE VISIT (OUTPATIENT)
Dept: HOME HEALTH SERVICES | Facility: HOME HEALTHCARE | Age: 50
End: 2025-06-30
Payer: COMMERCIAL

## 2025-06-30 VITALS
SYSTOLIC BLOOD PRESSURE: 110 MMHG | OXYGEN SATURATION: 94 % | DIASTOLIC BLOOD PRESSURE: 60 MMHG | HEART RATE: 61 BPM | RESPIRATION RATE: 18 BRPM

## 2025-06-30 VITALS
SYSTOLIC BLOOD PRESSURE: 110 MMHG | HEART RATE: 66 BPM | TEMPERATURE: 97.8 F | OXYGEN SATURATION: 95 % | DIASTOLIC BLOOD PRESSURE: 60 MMHG | RESPIRATION RATE: 18 BRPM

## 2025-06-30 VITALS
OXYGEN SATURATION: 94 % | HEART RATE: 51 BPM | TEMPERATURE: 97.5 F | SYSTOLIC BLOOD PRESSURE: 110 MMHG | DIASTOLIC BLOOD PRESSURE: 60 MMHG

## 2025-06-30 PROCEDURE — G0151 HHCP-SERV OF PT,EA 15 MIN: HCPCS

## 2025-06-30 PROCEDURE — G0299 HHS/HOSPICE OF RN EA 15 MIN: HCPCS

## 2025-06-30 PROCEDURE — G0152 HHCP-SERV OF OT,EA 15 MIN: HCPCS

## 2025-06-30 NOTE — PROGRESS NOTES
Name: Erik Cordon      : 1975      MRN: [id number]  Encounter Provider: Mandy Ramirez PA-C  Encounter Date: 2025   Encounter department: [facility]  :  Assessment & Plan  S/P vascular bypass  48 yo male smoker, lumbar radiculopathy, GERD, severe peripheral arterial disease with L EIA occlusion and LLE rest pain who recent underwent elective LLE bypass by Dr. Griffin.     -Left iliofemoral bypass with 8 mm rifampin soaked Dacron graft (25Elias)    -Left extended iliofemoral CFA endarterectomy with bovine pericardium patch angioplasty (25Elias)    RE:  First POV after iliofem endart with placement of Dacron bypass    -Biggest complaint is lot of pain in the LLE; surgical sites, groin, knee and calf; discussed measures to help with post-op pain  -L groin hematoma, otherwise, no problems with incisions  -Difficulty walking due to surgical pain  -No foot pain or wounds  -Home with VNA after surgery  -Washing groin with soap and water  -Watching diet/ bowels; discussed bowel regimen  -No fevers    -Called office last week due to pain and groin swelling; sent for PSA study    -L PSA study:     Widely patent L CFA and external iliac arteries with no evidence of active PSA.       Widely patent left common iliac-common femoral artery bypass.      L CFV and EIV are patent with no evidence of an arteriovenous fistula.      Well-defined lobular structure of mixed echogenicity in the area of the groin suggestive of post-surgical hematoma.    Exam:  Abdominal and groin incisions were primarily closed and secured with surgical glue at the skin.  There is no evidence of drainage, dehiscence or infection. (Wife uploaded image for chart during OV).       Palpable firm collection in groin ~ 6 cm over the skin. No heat      Left 2+ DP pulse.  AT/DP/PT signals present.  L foot is warm and well-perfused.  Motor sensor intact. No wounds.      Mild LLE edema especially the  knee    Plan: Overall stable POD #13 after left iliofem endarterectomy with patch angioplasty and iliofemoral bypass with 8 mm rifampin soaked Dacron graft (6/18/25, Elias)    -Overall progressing after vascular surgery  -Bypass is clinically patent and incision sites look good  -Lots of surgical pain and mild LLE edema  -Discussed with patient/wife that it will take some time for pain and swelling to improve, but reassured them that it is early in the course and he should continue to improve. Discussed conservative measures to help pain  -L groin hematoma will need to be monitored  -PT/ activity as tolerated  -Reviewed medications - scripts sent  -Discussed rationale for aspirin / atorvastatin 40  -Check baseline LEADS in 3 months  -Follow up office visit in 1 month        Local care:  -Recommend APAP around the clock (500 mg every 4 hours)  -Cool compresses 4 times daily to the groin and knee  -Prescribed oxycodone every 6-8 hours as needed for pain and therapy  -Elevate the leg periodically during the day and avoid leg hanging down  -Take small walks and continue with physical therapy  -Take it easy for the next 6 weeks  -Call if any increased groin swelling, problems with incision, uncontrolled pain, development of fevers, or other concerns  Orders:    VAS ARTERIAL DUPLEX- LOWER LIMB BILATERAL; Future    Severe peripheral arterial disease (HCC)  - Baseline lower extremity arterial duplex study September 2025  - Aspirin/statin therapy  - Management as above under vascular bypass  Orders:    VAS ARTERIAL DUPLEX- LOWER LIMB BILATERAL; Future    aspirin (ECOTRIN LOW STRENGTH) 81 mg EC tablet; Take 1 tablet (81 mg total) by mouth in the morning.    oxyCODONE (ROXICODONE) 5 immediate release tablet; Take 1 tablet (5 mg total) by mouth every 6 (six) hours as needed for severe pain Max Daily Amount: 20 mg    Rest pain of left lower extremity due to atherosclerosis (HCC)  - Now status post L iliofem bypass and L CFA  endart  - No evidence of L foot ischemia  - Management as above under post-bypass  Orders:    VAS ARTERIAL DUPLEX- LOWER LIMB BILATERAL; Future    aspirin (ECOTRIN LOW STRENGTH) 81 mg EC tablet; Take 1 tablet (81 mg total) by mouth in the morning.    oxyCODONE (ROXICODONE) 5 immediate release tablet; Take 1 tablet (5 mg total) by mouth every 6 (six) hours as needed for severe pain Max Daily Amount: 20 mg    Nicotine dependence with current use  - Has not smoked since the bypass; continue smoking cessation is encouraged  - Counseled on potential consequences for resuming smoking which may include complications with bypass surgery         History of Present Illness   CC: Patient is s/p L iliac to CFA bypass on 6/18/25 (Elias) and presents today for post-op visit. Reports painful L groin lump.     HPI  Erik Cordon is a 49 y.o. male for first postop visit after bypass surgery.    His biggest complaint is lots of incisional and left leg pain with difficulty walking.  He is using cool compresses once a day.  We discussed that he can use cool compresses much more frequently.  He feels Tylenol does not help but I would like him to try that around-the-clock.  He is using oxycodone every 4 hours but feels that is not even adequate.  Its only 2 weeks postsurgery, so we refilled the oxycodone, but I recommended that we try to stretch out the dosing.  Offered gabapentin, but he was on that previously and had a bad reaction which apparently included vision loss.     He also has a groin hematoma.  This is not expanding.    See above under assessment and plan.    Review of Systems   Constitutional: Negative.    HENT: Negative.     Eyes: Negative.    Respiratory: Negative.     Cardiovascular: Negative.    Gastrointestinal: Negative.    Endocrine: Negative.    Genitourinary: Negative.    Musculoskeletal:  Positive for back pain.        Leg pain   Skin: Negative.    Allergic/Immunologic: Negative.    Neurological:  "Negative.    Hematological: Negative.    Psychiatric/Behavioral: Negative.            Objective   /60 (BP Location: Left arm, Patient Position: Sitting)   Pulse 68   Ht 5' 6\" (1.676 m)   Wt 49.9 kg (110 lb)   BMI 17.75 kg/m²              Abdominal and groin incisions were primarily closed and secured with surgical glue at the skin.  There is no evidence of drainage, dehiscence or infection.    Right biphasic AT/DP signals.  DP signal present.    Left 2+ DP pulse.  AT/DP/PT signals present.  The foot is warm and well-perfused.  Motor sensor intact.    Mild LLE edema especially the knee    Physical Exam  Vitals and nursing note reviewed.   Constitutional:       Appearance: He is well-developed.   HENT:      Head: Normocephalic and atraumatic.     Eyes:      Pupils: Pupils are equal, round, and reactive to light.     Neck:      Thyroid: No thyromegaly.      Vascular: No JVD.      Trachea: Trachea normal.     Cardiovascular:      Rate and Rhythm: Normal rate and regular rhythm.      Pulses:           Radial pulses are 2+ on the right side and 2+ on the left side.        Dorsalis pedis pulses are detected w/ Doppler on the right side and 2+ on the left side.        Posterior tibial pulses are detected w/ Doppler on the right side and detected w/ Doppler on the left side.      Heart sounds: Normal heart sounds, S1 normal and S2 normal. No murmur heard.     No friction rub. No gallop.   Pulmonary:      Effort: Pulmonary effort is normal. No accessory muscle usage or respiratory distress.      Breath sounds: Normal breath sounds. No wheezing or rales.   Abdominal:      General: Bowel sounds are normal. There is no distension.      Palpations: Abdomen is soft.      Tenderness: There is no abdominal tenderness.     Musculoskeletal:         General: No deformity. Normal range of motion.      Cervical back: Neck supple.      Left lower leg: Edema present.     Skin:     General: Skin is warm and dry.      Findings: No " "lesion or rash.      Nails: There is no clubbing.     Neurological:      Mental Status: He is alert and oriented to person, place, and time.      Comments: Grossly normal    Psychiatric:         Behavior: Behavior is cooperative.                   I have reviewed and made appropriate changes to the review of systems input by the medical assistant.    Vitals:    07/01/25 1516   BP: 106/60   BP Location: Left arm   Patient Position: Sitting   Pulse: 68   Weight: 49.9 kg (110 lb)   Height: 5' 6\" (1.676 m)       Problem List[1]    Past Surgical History[2]    Family History[3]    Social History     Socioeconomic History    Marital status: Registered Domestic Partner     Spouse name: Not on file    Number of children: Not on file    Years of education: Not on file    Highest education level: Not on file   Occupational History    Not on file   Tobacco Use    Smoking status: Some Days     Current packs/day: 0.50     Average packs/day: 0.7 packs/day for 34.2 years (22.8 ttl pk-yrs)     Types: Cigarettes     Start date: 1/1/1997     Passive exposure: Yes    Smokeless tobacco: Never   Vaping Use    Vaping status: Never Used   Substance and Sexual Activity    Alcohol use: Not Currently     Comment: holidays    Drug use: Yes     Frequency: 7.0 times per week     Types: Marijuana    Sexual activity: Yes     Partners: Female   Other Topics Concern    Not on file   Social History Narrative    Not on file     Social Drivers of Health     Financial Resource Strain: Not on file   Food Insecurity: No Food Insecurity (6/21/2025)    Nursing - Inadequate Food Risk Classification     Worried About Running Out of Food in the Last Year: Never true     Ran Out of Food in the Last Year: Never true     Ran Out of Food in the Last Year: Never true   Transportation Needs: No Transportation Needs (6/25/2025)    OASIS : Transportation     Lack of Transportation (Medical): No     Lack of Transportation (Non-Medical): No     Patient Unable or " Declines to Respond: No   Physical Activity: Not on file   Stress: Not on file   Social Connections: Unknown (6/18/2024)    Received from [employer]     How often do you feel lonely or isolated from those around you? (Adult - for ages 18 years and over): Not on file   Intimate Partner Violence: Unknown (6/21/2025)    Nursing IPS     Feels Physically and Emotionally Safe: Not on file     Physically Hurt by Someone: Not on file     Humiliated or Emotionally Abused by Someone: Not on file     Physically Hurt by Someone: No     Hurt or Threatened by Someone: No   Housing Stability: Unknown (6/21/2025)    Nursing: Inadequate Housing Risk Classification     Has Housing: Not on file     Worried About Losing Housing: Not on file     Unable to Get Utilities: Not on file     Unable to Pay for Housing in the Last Year: No     Has Housing: No       Allergies[4]    Current Medications[5]           [1]   Patient Active Problem List  Diagnosis    Rest pain of left upper extremity due to atherosclerosis (HCC)    GERD (gastroesophageal reflux disease)    Nicotine dependence with current use    Radiculopathy, lumbar region    WEAVER (dyspnea on exertion)    Severe peripheral arterial disease (HCC)    Rest pain of left lower extremity due to atherosclerosis (HCC)   [2]   Past Surgical History:  Procedure Laterality Date    LUMBAR EPIDURAL INJECTION      pain shot    MULTIPLE TOOTH EXTRACTIONS      IL BYPASS W/VEIN AORTOILIAC Left 6/18/2025    Procedure: LEFT ILIAC TO COMMON FEMORAL ARTERY BYPASS;  Surgeon: Willie Griffin MD;  Location: [facility];  Service: Vascular    IL TEAEC W/WO PATCH GRAFT COMMON FEMORAL Left 6/18/2025    Procedure: ENDARTERECTOMY ARTERIAL FEMORAL;  Surgeon: Willie Griffin MD;  Location: [facility];  Service: Vascular   [3]   Family History  Problem Relation Name Age of Onset    No Known Problems Mother      No Known Problems Father      No Known Problems Sister      No Known  Problems Brother      No Known Problems Maternal Grandmother      No Known Problems Maternal Grandfather      No Known Problems Paternal Grandmother      No Known Problems Paternal Grandfather      No Known Problems Maternal Aunt      No Known Problems Maternal Uncle      No Known Problems Paternal Aunt      No Known Problems Paternal Uncle      No Known Problems Cousin     [4]   Allergies  Allergen Reactions    Pollen Extract Other (See Comments)     Watery eyes   [5]   Current Outpatient Medications:     acetaminophen (TYLENOL) 325 mg tablet, Take 3 tablets (975 mg total) by mouth every 8 (eight) hours as needed for mild pain, Disp: 30 tablet, Rfl: 0    albuterol (Ventolin HFA) 90 mcg/act inhaler, Inhale 2 puffs every 6 (six) hours as needed for wheezing, Disp: 18 g, Rfl: 5    aspirin (ECOTRIN LOW STRENGTH) 81 mg EC tablet, Take 1 tablet (81 mg total) by mouth in the morning., Disp: 30 tablet, Rfl: 0    atorvastatin (LIPITOR) 40 mg tablet, Take 1 tablet (40 mg total) by mouth daily, Disp: 90 tablet, Rfl: 0    docusate sodium (COLACE) 100 mg capsule, Take 1 capsule (100 mg total) by mouth 2 (two) times a day Stool softener. Hold loose stools, Disp: 60 capsule, Rfl: 0    oxyCODONE (ROXICODONE) 5 immediate release tablet, Take 1 tablet (5 mg total) by mouth every 4 (four) hours as needed for severe pain for up to 10 days Max Daily Amount: 30 mg, Disp: 30 tablet, Rfl: 0    senna (SENOKOT) 8.6 mg, Take 1 tablet (8.6 mg total) by mouth daily Stool softener. Hold loose stools, Disp: 30 tablet, Rfl: 0    polyethylene glycol (MIRALAX) 17 g packet, Take 17 g by mouth daily as needed (constipation) (Patient not taking: Reported on 7/1/2025), Disp: 10 each, Rfl: 0

## 2025-07-01 ENCOUNTER — OFFICE VISIT (OUTPATIENT)
Dept: VASCULAR SURGERY | Facility: CLINIC | Age: 50
End: 2025-07-01

## 2025-07-01 VITALS
DIASTOLIC BLOOD PRESSURE: 60 MMHG | WEIGHT: 110 LBS | HEART RATE: 68 BPM | BODY MASS INDEX: 17.68 KG/M2 | HEIGHT: 66 IN | SYSTOLIC BLOOD PRESSURE: 106 MMHG

## 2025-07-01 DIAGNOSIS — I70.228: ICD-10-CM

## 2025-07-01 DIAGNOSIS — F17.200 NICOTINE DEPENDENCE WITH CURRENT USE: ICD-10-CM

## 2025-07-01 DIAGNOSIS — Z95.828 S/P VASCULAR BYPASS: ICD-10-CM

## 2025-07-01 DIAGNOSIS — I70.229 REST PAIN OF LOWER EXTREMITY DUE TO ATHEROSCLEROSIS (HCC): ICD-10-CM

## 2025-07-01 DIAGNOSIS — I73.9 SEVERE PERIPHERAL ARTERIAL DISEASE (HCC): Primary | ICD-10-CM

## 2025-07-01 PROCEDURE — 99024 POSTOP FOLLOW-UP VISIT: CPT | Performed by: PHYSICIAN ASSISTANT

## 2025-07-01 RX ORDER — OXYCODONE HYDROCHLORIDE 5 MG/1
5 TABLET ORAL EVERY 6 HOURS PRN
Qty: 20 TABLET | Refills: 0 | Status: SHIPPED | OUTPATIENT
Start: 2025-07-01

## 2025-07-01 RX ORDER — ASPIRIN 81 MG/1
81 TABLET ORAL DAILY
Qty: 90 TABLET | Refills: 0 | Status: SHIPPED | OUTPATIENT
Start: 2025-07-01 | End: 2025-09-29

## 2025-07-01 NOTE — ASSESSMENT & PLAN NOTE
- Has not smoked since the bypass; continue smoking cessation is encouraged  - Counseled on potential consequences for resuming smoking which may include complications with bypass surgery

## 2025-07-01 NOTE — PATIENT INSTRUCTIONS
Local care:  -Recommend APAP around the clock (500 mg every 4 hours)  -Cool compresses 4 times daily to the groin and knee  -Prescribed oxycodone every 6-8 hours as needed for pain and therapy  -Elevate the leg periodically during the day and avoid leg hanging down  -Take small walks and continue with physical therapy  -Take it easy for the next 6 weeks  -Call if any increased groin swelling, problems with incision, uncontrolled pain, development of fevers, or other concerns

## 2025-07-01 NOTE — ASSESSMENT & PLAN NOTE
- Baseline lower extremity arterial duplex study September 2025  - Aspirin/statin therapy  - Management as above under vascular bypass  Orders:    VAS ARTERIAL DUPLEX- LOWER LIMB BILATERAL; Future    aspirin (ECOTRIN LOW STRENGTH) 81 mg EC tablet; Take 1 tablet (81 mg total) by mouth in the morning.    oxyCODONE (ROXICODONE) 5 immediate release tablet; Take 1 tablet (5 mg total) by mouth every 6 (six) hours as needed for severe pain Max Daily Amount: 20 mg

## 2025-07-02 ENCOUNTER — HOME CARE VISIT (OUTPATIENT)
Dept: HOME HEALTH SERVICES | Facility: HOME HEALTHCARE | Age: 50
End: 2025-07-02
Payer: COMMERCIAL

## 2025-07-02 VITALS
TEMPERATURE: 97.5 F | HEART RATE: 50 BPM | DIASTOLIC BLOOD PRESSURE: 70 MMHG | SYSTOLIC BLOOD PRESSURE: 124 MMHG | OXYGEN SATURATION: 99 %

## 2025-07-02 VITALS — SYSTOLIC BLOOD PRESSURE: 112 MMHG | DIASTOLIC BLOOD PRESSURE: 58 MMHG | HEART RATE: 68 BPM | RESPIRATION RATE: 18 BRPM

## 2025-07-02 PROCEDURE — G0152 HHCP-SERV OF OT,EA 15 MIN: HCPCS

## 2025-07-02 PROCEDURE — G0151 HHCP-SERV OF PT,EA 15 MIN: HCPCS

## 2025-07-02 RX ORDER — POLYETHYLENE GLYCOL 3350 17 G/17G
POWDER, FOR SOLUTION ORAL
Qty: 238 G | Refills: 0 | Status: SHIPPED | OUTPATIENT
Start: 2025-07-02

## 2025-07-03 ENCOUNTER — HOME CARE VISIT (OUTPATIENT)
Dept: HOME HEALTH SERVICES | Facility: HOME HEALTHCARE | Age: 50
End: 2025-07-03
Payer: COMMERCIAL

## 2025-07-07 ENCOUNTER — HOME CARE VISIT (OUTPATIENT)
Dept: HOME HEALTH SERVICES | Facility: HOME HEALTHCARE | Age: 50
End: 2025-07-07
Payer: COMMERCIAL

## 2025-07-07 VITALS
SYSTOLIC BLOOD PRESSURE: 108 MMHG | RESPIRATION RATE: 18 BRPM | HEART RATE: 43 BPM | DIASTOLIC BLOOD PRESSURE: 58 MMHG | OXYGEN SATURATION: 97 %

## 2025-07-07 PROCEDURE — G0152 HHCP-SERV OF OT,EA 15 MIN: HCPCS

## 2025-07-08 ENCOUNTER — TELEPHONE (OUTPATIENT)
Age: 50
End: 2025-07-08

## 2025-07-08 ENCOUNTER — HOME CARE VISIT (OUTPATIENT)
Dept: HOME HEALTH SERVICES | Facility: HOME HEALTHCARE | Age: 50
End: 2025-07-08
Payer: COMMERCIAL

## 2025-07-08 VITALS
OXYGEN SATURATION: 97 % | HEART RATE: 68 BPM | SYSTOLIC BLOOD PRESSURE: 110 MMHG | TEMPERATURE: 98.2 F | RESPIRATION RATE: 16 BRPM | DIASTOLIC BLOOD PRESSURE: 60 MMHG

## 2025-07-08 VITALS
SYSTOLIC BLOOD PRESSURE: 100 MMHG | HEART RATE: 60 BPM | OXYGEN SATURATION: 97 % | TEMPERATURE: 97.9 F | DIASTOLIC BLOOD PRESSURE: 60 MMHG

## 2025-07-08 PROCEDURE — G0151 HHCP-SERV OF PT,EA 15 MIN: HCPCS

## 2025-07-08 PROCEDURE — G0299 HHS/HOSPICE OF RN EA 15 MIN: HCPCS

## 2025-07-08 NOTE — TELEPHONE ENCOUNTER
Abi visiting nurse calling ,patient has an upcoming appointment on 7/25.  Trying to get an earlier appointment,  patient only wants to see      has surgery and in lots of pain between his groin area

## 2025-07-09 ENCOUNTER — NURSE TRIAGE (OUTPATIENT)
Age: 50
End: 2025-07-09

## 2025-07-09 ENCOUNTER — HOME CARE VISIT (OUTPATIENT)
Dept: HOME HEALTH SERVICES | Facility: HOME HEALTHCARE | Age: 50
End: 2025-07-09
Payer: COMMERCIAL

## 2025-07-09 DIAGNOSIS — I73.9 SEVERE PERIPHERAL ARTERIAL DISEASE (HCC): Primary | ICD-10-CM

## 2025-07-09 RX ORDER — PRAVASTATIN SODIUM 20 MG
20 TABLET ORAL DAILY
Qty: 30 TABLET | Refills: 0 | Status: SHIPPED | OUTPATIENT
Start: 2025-07-09 | End: 2025-07-16 | Stop reason: SDUPTHER

## 2025-07-09 NOTE — TELEPHONE ENCOUNTER
Advise increase in water intake during this time, continue with acetaminophen as indicated on the bottle, no indication for any additional pain medication at this time. Will include Gladys FAITH as an FYI.

## 2025-07-09 NOTE — TELEPHONE ENCOUNTER
Called and s/w Erik, explained that that muscle pain can be a side effect of Atorvastatin and to d/c it. Explained that pravastatin was sent to pharmacy and to not take it for 2-3 days after stopping atorvastatin. Pt verbalized understanding. Pt stated that he has been taking regular strength tylenol for pain and that it is not helping. Is there anything else he can take for the muscle pain?

## 2025-07-09 NOTE — TELEPHONE ENCOUNTER
"REASON FOR CONVERSATION: Medication Problem    SYMPTOMS: 10/10 muscle and joint paint to all 4 extremities since starting atorvastatin    OTHER HEALTH INFORMATION: Pt s/p  LEFT ILIAC TO COMMON FEMORAL ARTERY BYPASS , ENDARTERECTOMY ARTERIAL FEMORAL on 6/18/25.  Pt was discharged on 40 mg of atorvastatin daily.  Received call from [facility] reporting that the pt c/o 10/10 muscle and joint pain to all 4 extremities during her visit today and is wondering if this could be due to statin intolerance.      PROTOCOL DISPOSITION: Callback by PCP Today    CARE ADVICE PROVIDED: ezio Mendoza a message would be sent to providers to review.     PRACTICE FOLLOW-UP: Please advise.  Thank you        Reason for Disposition   Caller has NON-URGENT medicine question about med that PCP or specialist prescribed and triager unable to answer question    Answer Assessment - Initial Assessment Questions  1. NAME of MEDICINE: \"What medicine(s) are you calling about?\"      Atorvastatin   2. QUESTION: \"What is your question?\" (e.g., double dose of medicine, side effect)      Side effect   3. PRESCRIBER: \"Who prescribed the medicine?\" Reason: if prescribed by specialist, call should be referred to that group.      Yi Ramirez PA-C  4. SYMPTOMS: \"Do you have any symptoms?\" If Yes, ask: \"What symptoms are you having?\"  \"How bad are the symptoms (e.g., mild, moderate, severe)      10/10 Muscle and join pain to all 4 extremities    Protocols used: Medication Question Call-Adult-[place]    "

## 2025-07-09 NOTE — TELEPHONE ENCOUNTER
Reviewed. It is possible to have muscle pain side effect from atorvastatin. Pt should discontinue use of atorvastatin immediately. We can sent a prescription for an alternate statin medication, pravastatin. Will send to his pharmacy for 30 day supply, he should start taking in 2-3 days after discontinuing his Atorvastatin medication.

## 2025-07-09 NOTE — TELEPHONE ENCOUNTER
Called and s/w Luli, pt's wife, informed her of recommendations to increase water from [facility]. She verbalized understanding and stated that she will let Erik know.

## 2025-07-11 ENCOUNTER — TELEPHONE (OUTPATIENT)
Dept: VASCULAR SURGERY | Facility: CLINIC | Age: 50
End: 2025-07-11

## 2025-07-14 ENCOUNTER — APPOINTMENT (EMERGENCY)
Dept: VASCULAR ULTRASOUND | Facility: HOSPITAL | Age: 50
End: 2025-07-14
Payer: COMMERCIAL

## 2025-07-14 ENCOUNTER — OFFICE VISIT (OUTPATIENT)
Age: 50
End: 2025-07-14
Payer: COMMERCIAL

## 2025-07-14 ENCOUNTER — HOSPITAL ENCOUNTER (EMERGENCY)
Facility: HOSPITAL | Age: 50
Discharge: HOME | End: 2025-07-14
Attending: EMERGENCY MEDICINE | Admitting: EMERGENCY MEDICINE
Payer: COMMERCIAL

## 2025-07-14 VITALS
OXYGEN SATURATION: 99 % | SYSTOLIC BLOOD PRESSURE: 130 MMHG | RESPIRATION RATE: 14 BRPM | DIASTOLIC BLOOD PRESSURE: 60 MMHG | HEART RATE: 67 BPM | HEIGHT: 66 IN | TEMPERATURE: 97.2 F | WEIGHT: 112 LBS | BODY MASS INDEX: 18 KG/M2

## 2025-07-14 VITALS
DIASTOLIC BLOOD PRESSURE: 57 MMHG | SYSTOLIC BLOOD PRESSURE: 122 MMHG | HEART RATE: 61 BPM | OXYGEN SATURATION: 100 % | RESPIRATION RATE: 18 BRPM | TEMPERATURE: 98.6 F

## 2025-07-14 DIAGNOSIS — M79.89 LEG SWELLING: Primary | ICD-10-CM

## 2025-07-14 DIAGNOSIS — I73.9 SEVERE PERIPHERAL ARTERIAL DISEASE (HCC): ICD-10-CM

## 2025-07-14 DIAGNOSIS — I70.228: Primary | ICD-10-CM

## 2025-07-14 PROCEDURE — 99284 EMERGENCY DEPT VISIT MOD MDM: CPT | Performed by: EMERGENCY MEDICINE

## 2025-07-14 PROCEDURE — 93971 EXTREMITY STUDY: CPT

## 2025-07-14 PROCEDURE — 99284 EMERGENCY DEPT VISIT MOD MDM: CPT

## 2025-07-14 PROCEDURE — 99214 OFFICE O/P EST MOD 30 MIN: CPT | Performed by: FAMILY MEDICINE

## 2025-07-14 PROCEDURE — 93971 EXTREMITY STUDY: CPT | Performed by: SURGERY

## 2025-07-14 RX ORDER — CHLORHEXIDINE GLUCONATE ORAL RINSE 1.2 MG/ML
SOLUTION DENTAL
COMMUNITY
Start: 2025-05-05

## 2025-07-14 RX ORDER — IBUPROFEN 800 MG/1
TABLET, FILM COATED ORAL EVERY 8 HOURS PRN
COMMUNITY
Start: 2025-05-05 | End: 2025-08-01

## 2025-07-14 NOTE — TELEPHONE ENCOUNTER
Spouse called stating pt had an appt with PCP today and advised to report to ED for left leg edema and pain to r/o DVT and to make the office aware.

## 2025-07-14 NOTE — ASSESSMENT & PLAN NOTE
Recently had vascular surgery.  Follows vascular.  Wife present in the room providing history as well.  Today reports significant right lower extremity pain and swelling.  Exam remarkable for swelling compared to right lower extremity.    Recommended calling vascular and going to the emergency room immediately to evaluate for clot.  All questions answered.

## 2025-07-14 NOTE — ED PROVIDER NOTES
Time reflects when diagnosis was documented in both MDM as applicable and the Disposition within this note       Time User Action Codes Description Comment    7/14/2025  2:30 PM Jean Paul Montiel Add [M79.89] Leg swelling           ED Disposition       ED Disposition   Discharge    Condition   Stable    Date/Time   Mon Jul 14, 2025  2:30 PM    Comment   Erik Cordon discharge to home/self care.                   Assessment & Plan       Medical Decision Making    Decision making 49-year-old male presents emergency department referred to the emergency department by primary care due to some minimal swelling around his left ankle.  Ultrasound showed no DVT.  There was some digital seroma around his graft site I believe the edema in his ankle may be dependent from the seroma around his previous circulation.       Medications - No data to display    ED Risk Strat Scores                    No data recorded          Ultrasound showed no DVT.  Patient has some residual seroma around his surgical site.                  History of Present Illness       Chief Complaint   Patient presents with    Leg Swelling    Leg Pain     Pt reports bilateral leg pain and swelling ongoing for the past few weeks L>R. Vascular surgery last month. Sent from PCP to r/o DVT.       Past Medical History[1]   Past Surgical History[2]   Family History[3]   Social History[4]   E-Cigarette/Vaping    E-Cigarette Use Never User       E-Cigarette/Vaping Substances    Nicotine No     THC No     CBD No     Flavoring No     Other No     Unknown No       I have reviewed and agree with the history as documented.     HPI patient is a 49-year-old male history of a left leg bypass surgery in June.  Patient has residual seroma from the surgery.  He complains of some swelling around his ankle today.  Apparently seen in primary care referred to the emergency department to rule out DVT.  Patient denies any numbness or weakness.  He reports pain along his graft  site and surgical site.  He reports some left calf pain.  Past medical history of ocular surgery left leg  Referred by primary care due to some swelling around his ankle  Family has noncontributory  Social history, former smoker no history of drug abuse    Review of Systems   Constitutional:  Negative for fever.   HENT:  Negative for congestion.    Eyes:  Negative for pain and redness.   Respiratory:  Negative for cough and shortness of breath.    Cardiovascular:  Positive for leg swelling. Negative for chest pain.   Gastrointestinal:  Negative for abdominal pain and vomiting.           Objective       ED Triage Vitals [07/14/25 1239]   Temperature Pulse Blood Pressure Respirations SpO2 Patient Position - Orthostatic VS   98.6 °F (37 °C) 61 122/57 18 100 % Sitting      Temp Source Heart Rate Source BP Location FiO2 (%) Pain Score    Oral Monitor Left arm -- --      Vitals      Date and Time Temp Pulse SpO2 Resp BP Pain Score FACES Pain Rating User   07/14/25 1239 98.6 °F (37 °C) 61 100 % 18 122/57 -- -- RG            Physical Exam  Vitals and nursing note reviewed.   Constitutional:       Appearance: He is well-developed.   HENT:      Head: Normocephalic.      Right Ear: External ear normal.      Left Ear: External ear normal.      Nose: Nose normal.      Mouth/Throat:      Mouth: Mucous membranes are moist.      Pharynx: Oropharynx is clear.     Eyes:      General: Lids are normal.      Extraocular Movements: Extraocular movements intact.      Pupils: Pupils are equal, round, and reactive to light.       Cardiovascular:      Rate and Rhythm: Normal rate and regular rhythm.   Pulmonary:      Effort: Pulmonary effort is normal. No respiratory distress.     Musculoskeletal:         General: Swelling present. No deformity. Normal range of motion.      Cervical back: Normal range of motion and neck supple.      Comments: There is some minimal swelling around the left ankle, patient has a good dorsalis pedis posterior  tibial pulse by Doppler.  Good capillary fill less than 2 seconds.       Skin:     General: Skin is warm and dry.     Neurological:      Mental Status: He is alert and oriented to person, place, and time.     Psychiatric:         Mood and Affect: Mood normal.         Results Reviewed       None            VAS VENOUS DUPLEX -LOWER LIMB UNILATERAL    (Results Pending)       Procedures    ED Medication and Procedure Management   Prior to Admission Medications   Prescriptions Last Dose Informant Patient Reported? Taking?   acetaminophen (TYLENOL) 325 mg tablet  Self No No   Sig: Take 3 tablets (975 mg total) by mouth every 8 (eight) hours as needed for mild pain   albuterol (Ventolin HFA) 90 mcg/act inhaler  Self No No   Sig: Inhale 2 puffs every 6 (six) hours as needed for wheezing   aspirin (ECOTRIN LOW STRENGTH) 81 mg EC tablet   No No   Sig: Take 1 tablet (81 mg total) by mouth in the morning.   chlorhexidine (PERIDEX) 0.12 % solution   Yes No   Sig: USE AS DIRECTED 3 TIMES DAILY SPIT OUT   docusate sodium (COLACE) 100 mg capsule  Self No No   Sig: Take 1 capsule (100 mg total) by mouth 2 (two) times a day Stool softener. Hold loose stools   ibuprofen (MOTRIN) 800 mg tablet   Yes No   Sig: every 8 (eight) hours as needed   Patient not taking: Reported on 7/14/2025   oxyCODONE (ROXICODONE) 5 immediate release tablet   No No   Sig: Take 1 tablet (5 mg total) by mouth every 6 (six) hours as needed for severe pain Max Daily Amount: 20 mg   Patient not taking: Reported on 7/14/2025   polyethylene glycol (GLYCOLAX) 17 GM/SCOOP powder   No No   Sig: DISSOLVE 17 GRAMS IN 8 OZ OF FLUID LIQUID DRINK DAILY AS DIRECTED   pravastatin (PRAVACHOL) 20 mg tablet   No No   Sig: Take 1 tablet (20 mg total) by mouth daily   senna (SENOKOT) 8.6 mg  Self No No   Sig: Take 1 tablet (8.6 mg total) by mouth daily Stool softener. Hold loose stools      Facility-Administered Medications: None     Patient's Medications   Discharge  Prescriptions    No medications on file     No discharge procedures on file.  ED SEPSIS DOCUMENTATION   Time reflects when diagnosis was documented in both MDM as applicable and the Disposition within this note       Time User Action Codes Description Comment    7/14/2025  2:30 PM Jean Paul Montiel Add [M79.89] Leg swelling                      [1]   Past Medical History:  Diagnosis Date    Anxiety 2019    Chronic pain disorder     to back down to knees    Difficulty walking 2023    GERD (gastroesophageal reflux disease)     Shortness of breath     with ambulation   [2]   Past Surgical History:  Procedure Laterality Date    LUMBAR EPIDURAL INJECTION      pain shot    MULTIPLE TOOTH EXTRACTIONS      WY BYPASS W/VEIN AORTOILIAC Left 6/18/2025    Procedure: LEFT ILIAC TO COMMON FEMORAL ARTERY BYPASS;  Surgeon: Willie Griffin MD;  Location: [facility];  Service: Vascular    WY TEAEC W/WO PATCH GRAFT COMMON FEMORAL Left 6/18/2025    Procedure: ENDARTERECTOMY ARTERIAL FEMORAL;  Surgeon: Willie Griffin MD;  Location: [facility];  Service: Vascular   [3]   Family History  Problem Relation Name Age of Onset    No Known Problems Mother      No Known Problems Father      No Known Problems Sister      No Known Problems Brother      No Known Problems Maternal Grandmother      No Known Problems Maternal Grandfather      No Known Problems Paternal Grandmother      No Known Problems Paternal Grandfather      No Known Problems Maternal Aunt      No Known Problems Maternal Uncle      No Known Problems Paternal Aunt      No Known Problems Paternal Uncle      No Known Problems Cousin     [4]   Social History  Tobacco Use    Smoking status: Former     Current packs/day: 0.50     Average packs/day: 0.7 packs/day for 34.2 years (22.8 ttl pk-yrs)     Types: Cigarettes     Start date: 1/1/1997     Passive exposure: Past    Smokeless tobacco: Never   Vaping Use    Vaping status: Never Used   Substance Use Topics    Alcohol use: Not  Currently     Comment: holidays    Drug use: Not Currently     Frequency: 7.0 times per week     Types: Marijuana        Jean Paul Montiel MD  07/14/25 3204

## 2025-07-14 NOTE — PROGRESS NOTES
"Name: Erik Cordon      : 1975      MRN: [id number]  Encounter Provider: Aiden Pratt MD  Encounter Date: 2025   Encounter department: [facility] [place]  :  Assessment & Plan  Rest pain of left upper extremity due to atherosclerosis (HCC)  Severe peripheral arterial disease (HCC)  Recently had vascular surgery.  Follows vascular.  Wife present in the room providing history as well.  Today reports significant right lower extremity pain and swelling.  Exam remarkable for swelling compared to right lower extremity.    Recommended calling vascular and going to the emergency room immediately to evaluate for clot.  All questions answered.                History of Present Illness   HPI  Review of Systems    Objective   /60 (BP Location: Left arm, Patient Position: Sitting, Cuff Size: Large)   Pulse 67   Temp (!) 97.2 °F (36.2 °C) (Tympanic)   Resp 14   Ht 5' 6\" (1.676 m)   Wt 50.8 kg (112 lb)   SpO2 99%   BMI 18.08 kg/m²      Physical Exam  Vitals reviewed.   Constitutional:       General: He is not in acute distress.     Appearance: Normal appearance. He is not ill-appearing, toxic-appearing or diaphoretic.   HENT:      Head: Normocephalic and atraumatic.      Right Ear: External ear normal.      Left Ear: External ear normal.      Nose: No congestion or rhinorrhea.     Eyes:      General: No scleral icterus.        Right eye: No discharge.         Left eye: No discharge.      Conjunctiva/sclera: Conjunctivae normal.       Cardiovascular:      Rate and Rhythm: Normal rate.   Pulmonary:      Effort: Pulmonary effort is normal. No respiratory distress.     Musculoskeletal:         General: Swelling and tenderness present.      Comments: Left lower extremity     Neurological:      General: No focal deficit present.      Mental Status: He is alert and oriented to person, place, and time.     Psychiatric:         Mood and Affect: Mood normal.         Behavior: " Behavior normal.         Thought Content: Thought content normal.

## 2025-07-14 NOTE — DISCHARGE INSTRUCTIONS
No sign of clot on ultrasound today.  Your swelling may be related to swelling around your graft which is normal and should resolve over time  Follow-up with your vascular surgeon as planned

## 2025-07-15 ENCOUNTER — HOME CARE VISIT (OUTPATIENT)
Dept: HOME HEALTH SERVICES | Facility: HOME HEALTHCARE | Age: 50
End: 2025-07-15
Payer: COMMERCIAL

## 2025-07-15 VITALS
HEART RATE: 80 BPM | SYSTOLIC BLOOD PRESSURE: 110 MMHG | DIASTOLIC BLOOD PRESSURE: 70 MMHG | RESPIRATION RATE: 16 BRPM | TEMPERATURE: 97.4 F | OXYGEN SATURATION: 97 %

## 2025-07-15 PROCEDURE — G0299 HHS/HOSPICE OF RN EA 15 MIN: HCPCS

## 2025-07-16 DIAGNOSIS — I73.9 SEVERE PERIPHERAL ARTERIAL DISEASE (HCC): ICD-10-CM

## 2025-07-17 RX ORDER — PRAVASTATIN SODIUM 20 MG
20 TABLET ORAL DAILY
Qty: 30 TABLET | Refills: 0 | Status: SHIPPED | OUTPATIENT
Start: 2025-07-17 | End: 2025-07-23 | Stop reason: SDUPTHER

## 2025-07-21 ENCOUNTER — APPOINTMENT (OUTPATIENT)
Age: 50
End: 2025-07-21
Payer: COMMERCIAL

## 2025-07-21 ENCOUNTER — OFFICE VISIT (OUTPATIENT)
Age: 50
End: 2025-07-21
Payer: COMMERCIAL

## 2025-07-21 VITALS
SYSTOLIC BLOOD PRESSURE: 116 MMHG | DIASTOLIC BLOOD PRESSURE: 64 MMHG | OXYGEN SATURATION: 100 % | TEMPERATURE: 98.1 F | HEART RATE: 71 BPM | RESPIRATION RATE: 16 BRPM | WEIGHT: 114.4 LBS | BODY MASS INDEX: 18.39 KG/M2 | HEIGHT: 66 IN

## 2025-07-21 DIAGNOSIS — I73.9 SEVERE PERIPHERAL ARTERIAL DISEASE (HCC): ICD-10-CM

## 2025-07-21 DIAGNOSIS — M54.16 RADICULOPATHY, LUMBAR REGION: ICD-10-CM

## 2025-07-21 DIAGNOSIS — D64.9 ANEMIA, UNSPECIFIED TYPE: ICD-10-CM

## 2025-07-21 DIAGNOSIS — Z00.00 ANNUAL PHYSICAL EXAM: Primary | ICD-10-CM

## 2025-07-21 LAB
ERYTHROCYTE [DISTWIDTH] IN BLOOD BY AUTOMATED COUNT: 16.8 % (ref 11.6–15.1)
HCT VFR BLD AUTO: 31.7 % (ref 36.5–49.3)
HGB BLD-MCNC: 9.7 G/DL (ref 12–17)
MCH RBC QN AUTO: 27.8 PG (ref 26.8–34.3)
MCHC RBC AUTO-ENTMCNC: 30.6 G/DL (ref 31.4–37.4)
MCV RBC AUTO: 91 FL (ref 82–98)
PLATELET # BLD AUTO: 231 THOUSANDS/UL (ref 149–390)
PMV BLD AUTO: 11.5 FL (ref 8.9–12.7)
RBC # BLD AUTO: 3.49 MILLION/UL (ref 3.88–5.62)
TSH SERPL DL<=0.05 MIU/L-ACNC: 1.06 UIU/ML (ref 0.45–4.5)
WBC # BLD AUTO: 5.33 THOUSAND/UL (ref 4.31–10.16)

## 2025-07-21 PROCEDURE — 99214 OFFICE O/P EST MOD 30 MIN: CPT | Performed by: FAMILY MEDICINE

## 2025-07-21 PROCEDURE — 99396 PREV VISIT EST AGE 40-64: CPT | Performed by: FAMILY MEDICINE

## 2025-07-21 RX ORDER — GABAPENTIN 300 MG/1
300 CAPSULE ORAL
Qty: 30 CAPSULE | Refills: 0 | Status: SHIPPED | OUTPATIENT
Start: 2025-07-21

## 2025-07-22 ENCOUNTER — NURSE TRIAGE (OUTPATIENT)
Age: 50
End: 2025-07-22

## 2025-07-22 ENCOUNTER — HOME CARE VISIT (OUTPATIENT)
Dept: HOME HEALTH SERVICES | Facility: HOME HEALTHCARE | Age: 50
End: 2025-07-22
Payer: COMMERCIAL

## 2025-07-22 VITALS
OXYGEN SATURATION: 100 % | RESPIRATION RATE: 20 BRPM | HEART RATE: 74 BPM | SYSTOLIC BLOOD PRESSURE: 110 MMHG | TEMPERATURE: 98.2 F | DIASTOLIC BLOOD PRESSURE: 60 MMHG

## 2025-07-22 PROCEDURE — G0300 HHS/HOSPICE OF LPN EA 15 MIN: HCPCS

## 2025-07-22 NOTE — TELEPHONE ENCOUNTER
S/with patient wife and related message, patient wife Dana verbalized understanding and stated she will be taking patient to the ED.

## 2025-07-22 NOTE — TELEPHONE ENCOUNTER
"REASON FOR CONVERSATION: Leg Pain  Received call from Zoya from [facility]. The patient is s/p 06/18/2025 Left iliac to common femoral artery bypass w/ endarterectomy. He was seen in the office last 7/1 for follow up. Pt is c/o severe pain in bilateral lower extremities, abdominal pain so much he can't wear jeans, constipation, poor appetite, blood in stool and not being able to sleep at night. The patient reports pain in both legs, right foot pain/pain in toes making it difficult to walk or rest. Tingling that was in this left leg is now in the right. The patient \"might\" have some swelling in the groin area. Denies any open sores.     Pt reports the gabapentin 300 mg is not helping him at night and nothing is helping the pain right now. Concern is for swelling behind the right knee. Advised the ER for evaluation of all of his symptoms and pain management.      PROTOCOL DISPOSITION: Go to ED/[facility] Now (Or to Office with PCP Approval)    CARE ADVICE PROVIDED: Advised the patient go the ER for evaluation of all of his symptoms     PRACTICE FOLLOW-UP: Please review and f/u with patient when discharged.       Reason for Disposition   Major surgery in past month   Patient sounds very sick or weak to the triager    Answer Assessment - Initial Assessment Questions  1. ONSET: \"When did the pain start?\"       Ongoing s/p surgery   2. LOCATION: \"Where is the pain located?\"       Bilateral lower extremities and abdominal pain   3. PAIN: \"How bad is the pain?\"    (Scale 1-10; or mild, moderate, severe)      8-9/10- severe- patient moaning in the background   4. WORK OR EXERCISE: \"Has there been any recent work or exercise that involved this part of the body?\"       Denies   5. CAUSE: \"What do you think is causing the leg pain?\"      Severe PAD - s/p 06/18/2025 Left iliac to common femoral artery bypass w/ endarterectomy  6. OTHER SYMPTOMS: \"Do you have any other symptoms?\" (e.g., chest pain, back pain, breathing difficulty, " swelling, rash, fever, numbness, weakness)      Pt reports poor appetite, constipation and blood in stool    Protocols used: Leg Pain-Adult-[place]

## 2025-07-23 ENCOUNTER — TELEPHONE (OUTPATIENT)
Dept: VASCULAR SURGERY | Facility: CLINIC | Age: 50
End: 2025-07-23

## 2025-07-23 DIAGNOSIS — I73.9 SEVERE PERIPHERAL ARTERIAL DISEASE (HCC): ICD-10-CM

## 2025-07-23 RX ORDER — PRAVASTATIN SODIUM 20 MG
20 TABLET ORAL DAILY
Qty: 30 TABLET | Refills: 0 | Status: SHIPPED | OUTPATIENT
Start: 2025-07-23

## 2025-07-24 ENCOUNTER — RESULTS FOLLOW-UP (OUTPATIENT)
Age: 50
End: 2025-07-24

## 2025-07-24 DIAGNOSIS — D50.8 IRON DEFICIENCY ANEMIA SECONDARY TO INADEQUATE DIETARY IRON INTAKE: Primary | ICD-10-CM

## 2025-07-24 RX ORDER — FERROUS SULFATE 324(65)MG
324 TABLET, DELAYED RELEASE (ENTERIC COATED) ORAL
Qty: 90 TABLET | Refills: 1 | Status: SHIPPED | OUTPATIENT
Start: 2025-07-24

## 2025-07-24 NOTE — TELEPHONE ENCOUNTER
----- Message from Erendira Falk DO sent at 7/24/2025 10:43 AM EDT -----  Please notify patient that his anemia is worsening.  This can definitely cause lower extremity cramping and pain.  At this time he needs to start an oral iron supplement daily.  I will be sending to   the pharmacy  ----- Message -----  From: Lab, Background User  Sent: 7/21/2025  11:00 PM EDT  To: Erendira Falk DO

## 2025-07-24 NOTE — TELEPHONE ENCOUNTER
Patient notified.   anemia is worsening.  This can definitely cause lower extremity cramping and pain.  At this time he needs to start an oral iron supplement daily.  I will be sending to   the pharmacy

## 2025-07-30 ENCOUNTER — HOME CARE VISIT (OUTPATIENT)
Dept: HOME HEALTH SERVICES | Facility: HOME HEALTHCARE | Age: 50
End: 2025-07-30
Payer: COMMERCIAL

## 2025-07-30 VITALS
DIASTOLIC BLOOD PRESSURE: 60 MMHG | HEART RATE: 80 BPM | RESPIRATION RATE: 18 BRPM | TEMPERATURE: 98.1 F | SYSTOLIC BLOOD PRESSURE: 110 MMHG

## 2025-07-30 PROCEDURE — G0300 HHS/HOSPICE OF LPN EA 15 MIN: HCPCS

## 2025-07-30 RX ORDER — CHLORHEXIDINE GLUCONATE ORAL RINSE 1.2 MG/ML
SOLUTION DENTAL
Qty: 473 ML | OUTPATIENT
Start: 2025-07-30

## 2025-08-05 ENCOUNTER — HOME CARE VISIT (OUTPATIENT)
Dept: HOME HEALTH SERVICES | Facility: HOME HEALTHCARE | Age: 50
End: 2025-08-05
Payer: COMMERCIAL

## 2025-08-05 VITALS
OXYGEN SATURATION: 100 % | SYSTOLIC BLOOD PRESSURE: 110 MMHG | TEMPERATURE: 98 F | HEART RATE: 86 BPM | DIASTOLIC BLOOD PRESSURE: 60 MMHG | RESPIRATION RATE: 16 BRPM

## 2025-08-05 PROCEDURE — G0299 HHS/HOSPICE OF RN EA 15 MIN: HCPCS

## 2025-08-12 ENCOUNTER — HOME CARE VISIT (OUTPATIENT)
Dept: HOME HEALTH SERVICES | Facility: HOME HEALTHCARE | Age: 50
End: 2025-08-12
Payer: COMMERCIAL

## 2025-08-12 ENCOUNTER — OFFICE VISIT (OUTPATIENT)
Age: 50
End: 2025-08-12
Payer: COMMERCIAL

## 2025-08-12 ENCOUNTER — APPOINTMENT (OUTPATIENT)
Age: 50
End: 2025-08-12
Payer: COMMERCIAL

## 2025-08-12 ENCOUNTER — APPOINTMENT (OUTPATIENT)
Age: 50
End: 2025-08-12
Attending: STUDENT IN AN ORGANIZED HEALTH CARE EDUCATION/TRAINING PROGRAM
Payer: COMMERCIAL

## 2025-08-15 ENCOUNTER — HOME CARE VISIT (OUTPATIENT)
Dept: HOME HEALTH SERVICES | Facility: HOME HEALTHCARE | Age: 50
End: 2025-08-15
Payer: COMMERCIAL

## 2025-08-18 ENCOUNTER — RESULTS FOLLOW-UP (OUTPATIENT)
Age: 50
End: 2025-08-18

## 2025-08-18 ENCOUNTER — HOME CARE VISIT (OUTPATIENT)
Dept: HOME HEALTH SERVICES | Facility: HOME HEALTHCARE | Age: 50
End: 2025-08-18
Payer: COMMERCIAL

## 2025-08-18 VITALS
OXYGEN SATURATION: 97 % | HEART RATE: 82 BPM | RESPIRATION RATE: 16 BRPM | SYSTOLIC BLOOD PRESSURE: 110 MMHG | TEMPERATURE: 97.6 F | DIASTOLIC BLOOD PRESSURE: 60 MMHG

## 2025-08-18 PROCEDURE — G0299 HHS/HOSPICE OF RN EA 15 MIN: HCPCS

## (undated) DEVICE — DECANTER: Brand: UNBRANDED

## (undated) DEVICE — CLIP APPLIER: Brand: PREMIUM SURGICLIP

## (undated) DEVICE — HEMOSTATIC MATRIX SURGIFLO 8ML W/THROMBIN

## (undated) DEVICE — 40601 PROLONGED POSITIONING SYSTEM: Brand: 40601 PROLONGED POSITIONING SYSTEM

## (undated) DEVICE — PACK UNIVERSAL DRAPES SUB-Q ICD

## (undated) DEVICE — SOLUTION BOWL: Brand: KENDALL

## (undated) DEVICE — EXOFIN PRECISION PEN HIGH VISCOSITY TOPICAL SKIN ADHESIVE: Brand: EXOFIN PRECISION PEN, 1G

## (undated) DEVICE — STERILE BETHLEHEM FEM POP PACK: Brand: CARDINAL HEALTH

## (undated) DEVICE — VESSEL CANNULA

## (undated) DEVICE — INTENDED FOR TISSUE SEPARATION, AND OTHER PROCEDURES THAT REQUIRE A SHARP SURGICAL BLADE TO PUNCTURE OR CUT.: Brand: BARD-PARKER SAFETY BLADES SIZE 15, STERILE

## (undated) DEVICE — SUT PROLENE 5-0 C-1/C-1 36 IN 8720ZH

## (undated) DEVICE — CLIP APPLIER: Brand: PREMIUM SURGICLIP II

## (undated) DEVICE — SUT SILK 2-0 SH 30 IN K833H

## (undated) DEVICE — SURGICEL FIBRILLAR 1 X 2

## (undated) DEVICE — SUT PROLENE 6-0 BV-1/BV-1 24 IN 8805H

## (undated) DEVICE — SUT SILK 3-0 18 IN A184H

## (undated) DEVICE — SUT CHROMIC 2-0 MH 27 IN G127H

## (undated) DEVICE — INSTRUMENT POUCH: Brand: CONVERTORS

## (undated) DEVICE — SUT PDS PLUS 1 CTX 36IN PDP371T

## (undated) DEVICE — REM POLYHESIVE ADULT PATIENT RETURN ELECTRODE: Brand: VALLEYLAB

## (undated) DEVICE — SUT SILK 2-0 18 IN A185H

## (undated) DEVICE — TRAY FOLEY 16FR URIMETER SURESTEP

## (undated) DEVICE — SLIM BODY SKIN STAPLER: Brand: APPOSE ULC

## (undated) DEVICE — SUT VICRYL 2-0 CT-1 36 IN J945H

## (undated) DEVICE — TOWEL SURG XR DETECT GREEN STRL RFD

## (undated) DEVICE — SUT PROLENE 6-0 BV130 30 IN 8709H

## (undated) DEVICE — ASPIRATION/ANTICOAGULATION SET: Brand: HAEMONETICS CELL SAVER SYSTEM

## (undated) DEVICE — ANTIBACTERIAL UNDYED BRAIDED (POLYGLACTIN 910), SYNTHETIC ABSORBABLE SUTURE: Brand: COATED VICRYL

## (undated) DEVICE — SUT SILK 4-0 18 IN A183H

## (undated) DEVICE — APPLIER SURGICLIP PREMIUM SMALL 9IN

## (undated) DEVICE — SUT MONOCRYL 4-0 PS-2 18 IN Y496G

## (undated) DEVICE — PETRI DISH STERILE

## (undated) DEVICE — GLOVE SRG BIOGEL 7